# Patient Record
Sex: FEMALE | Race: WHITE | Employment: OTHER | URBAN - METROPOLITAN AREA
[De-identification: names, ages, dates, MRNs, and addresses within clinical notes are randomized per-mention and may not be internally consistent; named-entity substitution may affect disease eponyms.]

---

## 2017-01-01 ENCOUNTER — OFFICE VISIT (OUTPATIENT)
Dept: CARDIOLOGY CLINIC | Age: 67
End: 2017-01-01

## 2017-01-01 ENCOUNTER — HOSPITAL ENCOUNTER (INPATIENT)
Age: 67
LOS: 2 days | Discharge: HOME | DRG: 191 | End: 2017-08-04
Attending: EMERGENCY MEDICINE | Admitting: INTERNAL MEDICINE
Payer: OTHER GOVERNMENT

## 2017-01-01 ENCOUNTER — HOSPITAL ENCOUNTER (OUTPATIENT)
Dept: CT IMAGING | Age: 67
Discharge: HOME | End: 2017-11-01
Attending: INTERNAL MEDICINE
Payer: MEDICARE

## 2017-01-01 ENCOUNTER — TELEPHONE (OUTPATIENT)
Dept: RESPIRATORY THERAPY | Age: 67
End: 2017-01-01

## 2017-01-01 ENCOUNTER — HOSPITAL ENCOUNTER (EMERGENCY)
Age: 67
Discharge: HOME | End: 2017-08-25
Attending: EMERGENCY MEDICINE
Payer: OTHER GOVERNMENT

## 2017-01-01 ENCOUNTER — TELEPHONE (OUTPATIENT)
Dept: CARDIOLOGY CLINIC | Age: 67
End: 2017-01-01

## 2017-01-01 ENCOUNTER — APPOINTMENT (OUTPATIENT)
Dept: GENERAL RADIOLOGY | Age: 67
End: 2017-01-01
Attending: EMERGENCY MEDICINE
Payer: OTHER GOVERNMENT

## 2017-01-01 ENCOUNTER — HOSPITAL ENCOUNTER (OUTPATIENT)
Dept: NON INVASIVE DIAGNOSTICS | Age: 67
Discharge: HOME | End: 2017-10-13
Attending: NURSE PRACTITIONER
Payer: MEDICARE

## 2017-01-01 ENCOUNTER — APPOINTMENT (OUTPATIENT)
Dept: CT IMAGING | Age: 67
DRG: 191 | End: 2017-01-01
Attending: EMERGENCY MEDICINE
Payer: OTHER GOVERNMENT

## 2017-01-01 ENCOUNTER — APPOINTMENT (OUTPATIENT)
Dept: GENERAL RADIOLOGY | Age: 67
End: 2017-01-01
Attending: PHYSICIAN ASSISTANT
Payer: OTHER GOVERNMENT

## 2017-01-01 ENCOUNTER — CLINICAL SUPPORT (OUTPATIENT)
Dept: CARDIOLOGY CLINIC | Age: 67
End: 2017-01-01

## 2017-01-01 ENCOUNTER — APPOINTMENT (OUTPATIENT)
Dept: CT IMAGING | Age: 67
End: 2017-01-01
Attending: PHYSICIAN ASSISTANT
Payer: OTHER GOVERNMENT

## 2017-01-01 ENCOUNTER — DOCUMENTATION ONLY (OUTPATIENT)
Dept: CARDIOLOGY CLINIC | Age: 67
End: 2017-01-01

## 2017-01-01 VITALS
HEIGHT: 66 IN | OXYGEN SATURATION: 100 % | HEART RATE: 117 BPM | WEIGHT: 116.62 LBS | RESPIRATION RATE: 18 BRPM | BODY MASS INDEX: 18.74 KG/M2 | DIASTOLIC BLOOD PRESSURE: 77 MMHG | TEMPERATURE: 98.7 F | SYSTOLIC BLOOD PRESSURE: 146 MMHG

## 2017-01-01 VITALS
WEIGHT: 116 LBS | OXYGEN SATURATION: 93 % | TEMPERATURE: 98.5 F | HEIGHT: 65 IN | HEART RATE: 123 BPM | SYSTOLIC BLOOD PRESSURE: 104 MMHG | BODY MASS INDEX: 19.33 KG/M2 | RESPIRATION RATE: 24 BRPM | DIASTOLIC BLOOD PRESSURE: 64 MMHG

## 2017-01-01 VITALS
OXYGEN SATURATION: 98 % | HEIGHT: 67 IN | RESPIRATION RATE: 24 BRPM | DIASTOLIC BLOOD PRESSURE: 71 MMHG | TEMPERATURE: 97.6 F | HEART RATE: 93 BPM | SYSTOLIC BLOOD PRESSURE: 105 MMHG

## 2017-01-01 VITALS
TEMPERATURE: 98.9 F | SYSTOLIC BLOOD PRESSURE: 115 MMHG | HEART RATE: 82 BPM | WEIGHT: 120 LBS | DIASTOLIC BLOOD PRESSURE: 70 MMHG | BODY MASS INDEX: 19.29 KG/M2 | HEIGHT: 66 IN | RESPIRATION RATE: 20 BRPM | OXYGEN SATURATION: 94 %

## 2017-01-01 VITALS
BODY MASS INDEX: 19.3 KG/M2 | DIASTOLIC BLOOD PRESSURE: 64 MMHG | WEIGHT: 123 LBS | SYSTOLIC BLOOD PRESSURE: 102 MMHG | HEIGHT: 67 IN | HEART RATE: 87 BPM

## 2017-01-01 VITALS
RESPIRATION RATE: 16 BRPM | HEART RATE: 84 BPM | WEIGHT: 115 LBS | BODY MASS INDEX: 19.16 KG/M2 | TEMPERATURE: 98.5 F | SYSTOLIC BLOOD PRESSURE: 80 MMHG | DIASTOLIC BLOOD PRESSURE: 58 MMHG | HEIGHT: 65 IN

## 2017-01-01 DIAGNOSIS — Z85.118 H/O: LUNG CANCER: ICD-10-CM

## 2017-01-01 DIAGNOSIS — Z95.810 PRESENCE OF AUTOMATIC CARDIOVERTER/DEFIBRILLATOR (AICD): Primary | ICD-10-CM

## 2017-01-01 DIAGNOSIS — E86.0 DEHYDRATION: ICD-10-CM

## 2017-01-01 DIAGNOSIS — C34.90 LUNG CANCER (HCC): ICD-10-CM

## 2017-01-01 DIAGNOSIS — I50.22 CHRONIC SYSTOLIC HEART FAILURE (HCC): Primary | ICD-10-CM

## 2017-01-01 DIAGNOSIS — J43.1 PANLOBULAR EMPHYSEMA (HCC): ICD-10-CM

## 2017-01-01 DIAGNOSIS — Z95.810 PRESENCE OF AUTOMATIC CARDIOVERTER/DEFIBRILLATOR (AICD): ICD-10-CM

## 2017-01-01 DIAGNOSIS — J20.9 ACUTE BRONCHITIS, UNSPECIFIED ORGANISM: Primary | ICD-10-CM

## 2017-01-01 DIAGNOSIS — I50.22 CHF NYHA CLASS II (SYMPTOMS WITH MODERATELY STRENUOUS ACTIVITIES), CHRONIC, SYSTOLIC (HCC): ICD-10-CM

## 2017-01-01 DIAGNOSIS — Z95.810 ICD (IMPLANTABLE CARDIOVERTER-DEFIBRILLATOR) IN PLACE: Primary | ICD-10-CM

## 2017-01-01 DIAGNOSIS — I50.22 CHRONIC SYSTOLIC HEART FAILURE (HCC): ICD-10-CM

## 2017-01-01 DIAGNOSIS — J44.9 CHRONIC OBSTRUCTIVE PULMONARY DISEASE, UNSPECIFIED COPD TYPE (HCC): ICD-10-CM

## 2017-01-01 DIAGNOSIS — R62.7 FTT (FAILURE TO THRIVE) IN ADULT: ICD-10-CM

## 2017-01-01 DIAGNOSIS — I42.8 NICM (NONISCHEMIC CARDIOMYOPATHY) (HCC): ICD-10-CM

## 2017-01-01 DIAGNOSIS — J44.1 CHRONIC OBSTRUCTIVE PULMONARY DISEASE WITH ACUTE EXACERBATION (HCC): Primary | ICD-10-CM

## 2017-01-01 DIAGNOSIS — C34.90 MALIGNANT NEOPLASM OF LUNG, UNSPECIFIED LATERALITY, UNSPECIFIED PART OF LUNG (HCC): ICD-10-CM

## 2017-01-01 LAB
ALBUMIN SERPL BCP-MCNC: 3.2 G/DL (ref 3.5–5)
ALBUMIN SERPL-MCNC: 3 G/DL (ref 3.5–5)
ALBUMIN SERPL-MCNC: 3.1 G/DL (ref 3.5–5)
ALBUMIN/GLOB SERPL: 0.9 {RATIO} (ref 1.1–2.2)
ALBUMIN/GLOB SERPL: 0.9 {RATIO} (ref 1.1–2.2)
ALBUMIN/GLOB SERPL: 1 {RATIO} (ref 1.1–2.2)
ALP SERPL-CCNC: 74 U/L (ref 45–117)
ALP SERPL-CCNC: 74 U/L (ref 45–117)
ALP SERPL-CCNC: 84 U/L (ref 45–117)
ALT SERPL-CCNC: 17 U/L (ref 12–78)
ALT SERPL-CCNC: 18 U/L (ref 12–78)
ALT SERPL-CCNC: 20 U/L (ref 12–78)
ANION GAP BLD CALC-SCNC: 8 MMOL/L (ref 5–15)
ANION GAP BLD CALC-SCNC: 8 MMOL/L (ref 5–15)
ANION GAP SERPL CALC-SCNC: 7 MMOL/L (ref 5–15)
ANION GAP SERPL CALC-SCNC: 8 MMOL/L (ref 5–15)
APPEARANCE UR: CLEAR
AST SERPL W P-5'-P-CCNC: 16 U/L (ref 15–37)
AST SERPL-CCNC: 11 U/L (ref 15–37)
AST SERPL-CCNC: 7 U/L (ref 15–37)
ATRIAL RATE: 115 BPM
ATRIAL RATE: 90 BPM
BACTERIA SPEC CULT: NORMAL
BACTERIA URNS QL MICRO: NEGATIVE /HPF
BASOPHILS # BLD AUTO: 0 K/UL (ref 0–0.1)
BASOPHILS # BLD: 0 % (ref 0–1)
BASOPHILS # BLD: 0 K/UL (ref 0–0.1)
BASOPHILS NFR BLD: 0 % (ref 0–1)
BILIRUB SERPL-MCNC: 0.5 MG/DL (ref 0.2–1)
BILIRUB SERPL-MCNC: 0.5 MG/DL (ref 0.2–1)
BILIRUB SERPL-MCNC: 0.7 MG/DL (ref 0.2–1)
BILIRUB UR QL: NEGATIVE
BNP SERPL-MCNC: 32.1 PG/ML (ref 0–100)
BUN SERPL-MCNC: 14 MG/DL (ref 6–20)
BUN SERPL-MCNC: 17 MG/DL (ref 8–27)
BUN SERPL-MCNC: 20 MG/DL (ref 6–20)
BUN SERPL-MCNC: 23 MG/DL (ref 6–20)
BUN SERPL-MCNC: 24 MG/DL (ref 6–20)
BUN/CREAT SERPL: 15 (ref 12–28)
BUN/CREAT SERPL: 16 (ref 12–20)
BUN/CREAT SERPL: 20 (ref 12–20)
BUN/CREAT SERPL: 20 (ref 12–20)
BUN/CREAT SERPL: 21 (ref 12–20)
CALCIUM SERPL-MCNC: 8.3 MG/DL (ref 8.5–10.1)
CALCIUM SERPL-MCNC: 8.4 MG/DL (ref 8.5–10.1)
CALCIUM SERPL-MCNC: 8.6 MG/DL (ref 8.5–10.1)
CALCIUM SERPL-MCNC: 8.9 MG/DL (ref 8.5–10.1)
CALCIUM SERPL-MCNC: 9.3 MG/DL (ref 8.7–10.3)
CALCULATED P AXIS, ECG09: 75 DEGREES
CALCULATED P AXIS, ECG09: 78 DEGREES
CALCULATED R AXIS, ECG10: 68 DEGREES
CALCULATED R AXIS, ECG10: 71 DEGREES
CALCULATED T AXIS, ECG11: 78 DEGREES
CALCULATED T AXIS, ECG11: 82 DEGREES
CHLORIDE SERPL-SCNC: 101 MMOL/L (ref 97–108)
CHLORIDE SERPL-SCNC: 105 MMOL/L (ref 97–108)
CHLORIDE SERPL-SCNC: 95 MMOL/L (ref 97–108)
CHLORIDE SERPL-SCNC: 96 MMOL/L (ref 97–108)
CHLORIDE SERPL-SCNC: 99 MMOL/L (ref 96–106)
CO2 SERPL-SCNC: 25 MMOL/L (ref 18–29)
CO2 SERPL-SCNC: 26 MMOL/L (ref 21–32)
CO2 SERPL-SCNC: 27 MMOL/L (ref 21–32)
COLOR UR: ABNORMAL
CREAT SERPL-MCNC: 0.9 MG/DL (ref 0.55–1.02)
CREAT SERPL-MCNC: 1 MG/DL (ref 0.55–1.02)
CREAT SERPL-MCNC: 1.13 MG/DL (ref 0.55–1.02)
CREAT SERPL-MCNC: 1.13 MG/DL (ref 0.57–1)
CREAT SERPL-MCNC: 1.14 MG/DL (ref 0.55–1.02)
DIAGNOSIS, 93000: NORMAL
DIAGNOSIS, 93000: NORMAL
EOSINOPHIL # BLD: 0 K/UL (ref 0–0.4)
EOSINOPHIL # BLD: 0.1 K/UL (ref 0–0.4)
EOSINOPHIL NFR BLD: 0 % (ref 0–7)
EOSINOPHIL NFR BLD: 1 % (ref 0–7)
EPITH CASTS URNS QL MICRO: ABNORMAL /LPF
ERYTHROCYTE [DISTWIDTH] IN BLOOD BY AUTOMATED COUNT: 14.6 % (ref 11.5–14.5)
ERYTHROCYTE [DISTWIDTH] IN BLOOD BY AUTOMATED COUNT: 14.8 % (ref 11.5–14.5)
ERYTHROCYTE [DISTWIDTH] IN BLOOD BY AUTOMATED COUNT: 15.5 % (ref 11.5–14.5)
GLOBULIN SER CALC-MCNC: 3.1 G/DL (ref 2–4)
GLOBULIN SER CALC-MCNC: 3.3 G/DL (ref 2–4)
GLOBULIN SER CALC-MCNC: 3.4 G/DL (ref 2–4)
GLUCOSE SERPL-MCNC: 104 MG/DL (ref 65–100)
GLUCOSE SERPL-MCNC: 105 MG/DL (ref 65–100)
GLUCOSE SERPL-MCNC: 108 MG/DL (ref 65–100)
GLUCOSE SERPL-MCNC: 110 MG/DL (ref 65–99)
GLUCOSE SERPL-MCNC: 92 MG/DL (ref 65–100)
GLUCOSE UR STRIP.AUTO-MCNC: NEGATIVE MG/DL
HCT VFR BLD AUTO: 27 % (ref 35–47)
HCT VFR BLD AUTO: 29.4 % (ref 35–47)
HCT VFR BLD AUTO: 31.2 % (ref 35–47)
HGB BLD-MCNC: 10.4 G/DL (ref 11.5–16)
HGB BLD-MCNC: 9.1 G/DL (ref 11.5–16)
HGB BLD-MCNC: 9.7 G/DL (ref 11.5–16)
HGB UR QL STRIP: ABNORMAL
HYALINE CASTS URNS QL MICRO: ABNORMAL /LPF (ref 0–5)
KETONES UR QL STRIP.AUTO: NEGATIVE MG/DL
LACTATE SERPL-SCNC: 0.9 MMOL/L (ref 0.4–2)
LEUKOCYTE ESTERASE UR QL STRIP.AUTO: NEGATIVE
LYMPHOCYTES # BLD AUTO: 7 % (ref 12–49)
LYMPHOCYTES # BLD: 0.9 K/UL (ref 0.8–3.5)
LYMPHOCYTES # BLD: 1.1 K/UL (ref 0.8–3.5)
LYMPHOCYTES NFR BLD: 7 % (ref 12–49)
MAGNESIUM SERPL-MCNC: 1.9 MG/DL (ref 1.6–2.3)
MCH RBC QN AUTO: 30.4 PG (ref 26–34)
MCH RBC QN AUTO: 30.8 PG (ref 26–34)
MCH RBC QN AUTO: 31.8 PG (ref 26–34)
MCHC RBC AUTO-ENTMCNC: 33 G/DL (ref 30–36.5)
MCHC RBC AUTO-ENTMCNC: 33.3 G/DL (ref 30–36.5)
MCHC RBC AUTO-ENTMCNC: 33.7 G/DL (ref 30–36.5)
MCV RBC AUTO: 92.2 FL (ref 80–99)
MCV RBC AUTO: 92.3 FL (ref 80–99)
MCV RBC AUTO: 94.4 FL (ref 80–99)
MONOCYTES # BLD: 0.1 K/UL (ref 0–1)
MONOCYTES # BLD: 0.4 K/UL (ref 0–1)
MONOCYTES NFR BLD AUTO: 3 % (ref 5–13)
MONOCYTES NFR BLD: 1 % (ref 5–13)
NEUTS SEG # BLD: 12.8 K/UL (ref 1.8–8)
NEUTS SEG # BLD: 13.1 K/UL (ref 1.8–8)
NEUTS SEG NFR BLD AUTO: 90 % (ref 32–75)
NEUTS SEG NFR BLD: 91 % (ref 32–75)
NITRITE UR QL STRIP.AUTO: NEGATIVE
P-R INTERVAL, ECG05: 146 MS
P-R INTERVAL, ECG05: 150 MS
PH UR STRIP: 6 [PH] (ref 5–8)
PLATELET # BLD AUTO: 102 K/UL (ref 150–400)
PLATELET # BLD AUTO: 120 K/UL (ref 150–400)
PLATELET # BLD AUTO: 153 K/UL (ref 150–400)
POTASSIUM SERPL-SCNC: 3.9 MMOL/L (ref 3.5–5.1)
POTASSIUM SERPL-SCNC: 4 MMOL/L (ref 3.5–5.1)
POTASSIUM SERPL-SCNC: 4.1 MMOL/L (ref 3.5–5.1)
POTASSIUM SERPL-SCNC: 4.2 MMOL/L (ref 3.5–5.1)
POTASSIUM SERPL-SCNC: 4.4 MMOL/L (ref 3.5–5.2)
PROT SERPL-MCNC: 6.2 G/DL (ref 6.4–8.2)
PROT SERPL-MCNC: 6.3 G/DL (ref 6.4–8.2)
PROT SERPL-MCNC: 6.6 G/DL (ref 6.4–8.2)
PROT UR STRIP-MCNC: NEGATIVE MG/DL
Q-T INTERVAL, ECG07: 304 MS
Q-T INTERVAL, ECG07: 346 MS
QRS DURATION, ECG06: 74 MS
QRS DURATION, ECG06: 78 MS
QTC CALCULATION (BEZET), ECG08: 420 MS
QTC CALCULATION (BEZET), ECG08: 423 MS
RBC # BLD AUTO: 2.86 M/UL (ref 3.8–5.2)
RBC # BLD AUTO: 3.19 M/UL (ref 3.8–5.2)
RBC # BLD AUTO: 3.38 M/UL (ref 3.8–5.2)
RBC #/AREA URNS HPF: ABNORMAL /HPF (ref 0–5)
SERVICE CMNT-IMP: NORMAL
SODIUM SERPL-SCNC: 129 MMOL/L (ref 136–145)
SODIUM SERPL-SCNC: 130 MMOL/L (ref 136–145)
SODIUM SERPL-SCNC: 135 MMOL/L (ref 136–145)
SODIUM SERPL-SCNC: 139 MMOL/L (ref 136–145)
SODIUM SERPL-SCNC: 140 MMOL/L (ref 134–144)
SP GR UR REFRACTOMETRY: 1.01 (ref 1–1.03)
TROPONIN I SERPL-MCNC: <0.04 NG/ML
UROBILINOGEN UR QL STRIP.AUTO: 0.2 EU/DL (ref 0.2–1)
VENTRICULAR RATE, ECG03: 115 BPM
VENTRICULAR RATE, ECG03: 90 BPM
WBC # BLD AUTO: 13.9 K/UL (ref 3.6–11)
WBC # BLD AUTO: 14.5 K/UL (ref 3.6–11)
WBC # BLD AUTO: 7.2 K/UL (ref 3.6–11)
WBC URNS QL MICRO: ABNORMAL /HPF (ref 0–4)

## 2017-01-01 PROCEDURE — 71250 CT THORAX DX C-: CPT

## 2017-01-01 PROCEDURE — 74011250637 HC RX REV CODE- 250/637: Performed by: INTERNAL MEDICINE

## 2017-01-01 PROCEDURE — 74011000250 HC RX REV CODE- 250: Performed by: INTERNAL MEDICINE

## 2017-01-01 PROCEDURE — 36415 COLL VENOUS BLD VENIPUNCTURE: CPT | Performed by: INTERNAL MEDICINE

## 2017-01-01 PROCEDURE — 96361 HYDRATE IV INFUSION ADD-ON: CPT

## 2017-01-01 PROCEDURE — 96374 THER/PROPH/DIAG INJ IV PUSH: CPT

## 2017-01-01 PROCEDURE — 94640 AIRWAY INHALATION TREATMENT: CPT

## 2017-01-01 PROCEDURE — 85025 COMPLETE CBC W/AUTO DIFF WBC: CPT | Performed by: EMERGENCY MEDICINE

## 2017-01-01 PROCEDURE — 77030029684 HC NEB SM VOL KT MONA -A

## 2017-01-01 PROCEDURE — 99285 EMERGENCY DEPT VISIT HI MDM: CPT

## 2017-01-01 PROCEDURE — 74011636637 HC RX REV CODE- 636/637: Performed by: INTERNAL MEDICINE

## 2017-01-01 PROCEDURE — 74011636320 HC RX REV CODE- 636/320: Performed by: EMERGENCY MEDICINE

## 2017-01-01 PROCEDURE — 93306 TTE W/DOPPLER COMPLETE: CPT

## 2017-01-01 PROCEDURE — 87040 BLOOD CULTURE FOR BACTERIA: CPT | Performed by: EMERGENCY MEDICINE

## 2017-01-01 PROCEDURE — 74011000250 HC RX REV CODE- 250: Performed by: EMERGENCY MEDICINE

## 2017-01-01 PROCEDURE — 36415 COLL VENOUS BLD VENIPUNCTURE: CPT | Performed by: EMERGENCY MEDICINE

## 2017-01-01 PROCEDURE — 80053 COMPREHEN METABOLIC PANEL: CPT | Performed by: EMERGENCY MEDICINE

## 2017-01-01 PROCEDURE — 74011250636 HC RX REV CODE- 250/636: Performed by: INTERNAL MEDICINE

## 2017-01-01 PROCEDURE — 77010033678 HC OXYGEN DAILY

## 2017-01-01 PROCEDURE — 93005 ELECTROCARDIOGRAM TRACING: CPT

## 2017-01-01 PROCEDURE — 74011000258 HC RX REV CODE- 258: Performed by: EMERGENCY MEDICINE

## 2017-01-01 PROCEDURE — 74011250636 HC RX REV CODE- 250/636: Performed by: EMERGENCY MEDICINE

## 2017-01-01 PROCEDURE — 74011250636 HC RX REV CODE- 250/636: Performed by: PHYSICIAN ASSISTANT

## 2017-01-01 PROCEDURE — 80053 COMPREHEN METABOLIC PANEL: CPT | Performed by: PHYSICIAN ASSISTANT

## 2017-01-01 PROCEDURE — 65660000000 HC RM CCU STEPDOWN

## 2017-01-01 PROCEDURE — 85027 COMPLETE CBC AUTOMATED: CPT | Performed by: INTERNAL MEDICINE

## 2017-01-01 PROCEDURE — 83605 ASSAY OF LACTIC ACID: CPT | Performed by: EMERGENCY MEDICINE

## 2017-01-01 PROCEDURE — 65660000001 HC RM ICU INTERMED STEPDOWN

## 2017-01-01 PROCEDURE — 80048 BASIC METABOLIC PNL TOTAL CA: CPT | Performed by: INTERNAL MEDICINE

## 2017-01-01 PROCEDURE — 71275 CT ANGIOGRAPHY CHEST: CPT

## 2017-01-01 PROCEDURE — 71010 XR CHEST PORT: CPT

## 2017-01-01 PROCEDURE — 96365 THER/PROPH/DIAG IV INF INIT: CPT

## 2017-01-01 PROCEDURE — 74011000250 HC RX REV CODE- 250: Performed by: PHYSICIAN ASSISTANT

## 2017-01-01 PROCEDURE — 81001 URINALYSIS AUTO W/SCOPE: CPT | Performed by: INTERNAL MEDICINE

## 2017-01-01 PROCEDURE — 84484 ASSAY OF TROPONIN QUANT: CPT | Performed by: PHYSICIAN ASSISTANT

## 2017-01-01 RX ORDER — FERROUS SULFATE, DRIED 160(50) MG
1 TABLET, EXTENDED RELEASE ORAL
Status: CANCELLED | OUTPATIENT
Start: 2017-01-01

## 2017-01-01 RX ORDER — SODIUM CHLORIDE 0.9 % (FLUSH) 0.9 %
10 SYRINGE (ML) INJECTION
Status: COMPLETED | OUTPATIENT
Start: 2017-01-01 | End: 2017-01-01

## 2017-01-01 RX ORDER — IPRATROPIUM BROMIDE 0.5 MG/2.5ML
0.5 SOLUTION RESPIRATORY (INHALATION)
Status: DISCONTINUED | OUTPATIENT
Start: 2017-01-01 | End: 2017-01-01 | Stop reason: SDUPTHER

## 2017-01-01 RX ORDER — SPIRONOLACTONE 25 MG/1
25 TABLET ORAL DAILY
Status: DISCONTINUED | OUTPATIENT
Start: 2017-01-01 | End: 2017-01-01 | Stop reason: HOSPADM

## 2017-01-01 RX ORDER — ONDANSETRON 2 MG/ML
4 INJECTION INTRAMUSCULAR; INTRAVENOUS
Status: DISCONTINUED | OUTPATIENT
Start: 2017-01-01 | End: 2017-01-01 | Stop reason: ALTCHOICE

## 2017-01-01 RX ORDER — ALPRAZOLAM 0.25 MG/1
0.25 TABLET ORAL
Status: DISCONTINUED | OUTPATIENT
Start: 2017-01-01 | End: 2017-01-01

## 2017-01-01 RX ORDER — SODIUM CHLORIDE 0.9 % (FLUSH) 0.9 %
5-10 SYRINGE (ML) INJECTION EVERY 8 HOURS
Status: DISCONTINUED | OUTPATIENT
Start: 2017-01-01 | End: 2017-01-01 | Stop reason: HOSPADM

## 2017-01-01 RX ORDER — FUROSEMIDE 20 MG/1
10 TABLET ORAL AS NEEDED
Qty: 30 TAB | Refills: 0 | Status: SHIPPED | OUTPATIENT
Start: 2017-01-01 | End: 2018-01-01

## 2017-01-01 RX ORDER — ALBUTEROL SULFATE 0.83 MG/ML
2.5 SOLUTION RESPIRATORY (INHALATION)
Qty: 24 EACH | Refills: 3 | Status: SHIPPED | OUTPATIENT
Start: 2017-01-01 | End: 2018-01-01 | Stop reason: SDUPTHER

## 2017-01-01 RX ORDER — ONDANSETRON HYDROCHLORIDE 8 MG/1
8 TABLET, FILM COATED ORAL
COMMUNITY

## 2017-01-01 RX ORDER — PREDNISONE 20 MG/1
40 TABLET ORAL
Status: DISCONTINUED | OUTPATIENT
Start: 2017-01-01 | End: 2017-01-01 | Stop reason: HOSPADM

## 2017-01-01 RX ORDER — LISINOPRIL 20 MG/1
20 TABLET ORAL DAILY
Status: DISCONTINUED | OUTPATIENT
Start: 2017-01-01 | End: 2017-01-01 | Stop reason: HOSPADM

## 2017-01-01 RX ORDER — FUROSEMIDE 20 MG/1
10 TABLET ORAL AS NEEDED
Qty: 30 TAB | Refills: 0
Start: 2017-01-01 | End: 2017-01-01 | Stop reason: SDUPTHER

## 2017-01-01 RX ORDER — GUAIFENESIN 600 MG/1
600 TABLET, EXTENDED RELEASE ORAL
Status: DISCONTINUED | OUTPATIENT
Start: 2017-01-01 | End: 2017-01-01 | Stop reason: HOSPADM

## 2017-01-01 RX ORDER — LEVOFLOXACIN 5 MG/ML
750 INJECTION, SOLUTION INTRAVENOUS
Status: DISCONTINUED | OUTPATIENT
Start: 2017-01-01 | End: 2017-01-01

## 2017-01-01 RX ORDER — ALBUTEROL SULFATE 90 UG/1
2 AEROSOL, METERED RESPIRATORY (INHALATION)
Qty: 1 INHALER | Refills: 3 | Status: SHIPPED | OUTPATIENT
Start: 2017-01-01 | End: 2018-01-01 | Stop reason: SDUPTHER

## 2017-01-01 RX ORDER — AZITHROMYCIN 250 MG/1
250 TABLET, FILM COATED ORAL DAILY
Qty: 30 TAB | Refills: 1 | Status: SHIPPED | OUTPATIENT
Start: 2017-01-01 | End: 2017-01-01 | Stop reason: SDUPTHER

## 2017-01-01 RX ORDER — IPRATROPIUM BROMIDE AND ALBUTEROL SULFATE 2.5; .5 MG/3ML; MG/3ML
3 SOLUTION RESPIRATORY (INHALATION)
Status: DISCONTINUED | OUTPATIENT
Start: 2017-01-01 | End: 2017-01-01 | Stop reason: HOSPADM

## 2017-01-01 RX ORDER — AZITHROMYCIN 250 MG/1
250 TABLET, FILM COATED ORAL DAILY
Qty: 30 TAB | Refills: 3 | Status: SHIPPED | OUTPATIENT
Start: 2017-01-01

## 2017-01-01 RX ORDER — PREDNISONE 10 MG/1
10 TABLET ORAL
Status: DISCONTINUED | OUTPATIENT
Start: 2017-01-01 | End: 2017-01-01

## 2017-01-01 RX ORDER — LISINOPRIL 20 MG/1
20 TABLET ORAL DAILY
Qty: 30 TAB | Refills: 1 | Status: SHIPPED | OUTPATIENT
Start: 2017-01-01 | End: 2017-01-01 | Stop reason: SDUPTHER

## 2017-01-01 RX ORDER — ONDANSETRON 4 MG/1
8 TABLET, ORALLY DISINTEGRATING ORAL
Status: DISCONTINUED | OUTPATIENT
Start: 2017-01-01 | End: 2017-01-01 | Stop reason: HOSPADM

## 2017-01-01 RX ORDER — SODIUM CHLORIDE 0.9 % (FLUSH) 0.9 %
5-10 SYRINGE (ML) INJECTION AS NEEDED
Status: DISCONTINUED | OUTPATIENT
Start: 2017-01-01 | End: 2017-01-01 | Stop reason: HOSPADM

## 2017-01-01 RX ORDER — LEVOFLOXACIN 5 MG/ML
750 INJECTION, SOLUTION INTRAVENOUS
Status: COMPLETED | OUTPATIENT
Start: 2017-01-01 | End: 2017-01-01

## 2017-01-01 RX ORDER — GUAIFENESIN 100 MG/5ML
81 LIQUID (ML) ORAL DAILY
Status: DISCONTINUED | OUTPATIENT
Start: 2017-01-01 | End: 2017-01-01 | Stop reason: HOSPADM

## 2017-01-01 RX ORDER — METOPROLOL SUCCINATE 50 MG/1
50 TABLET, EXTENDED RELEASE ORAL DAILY
Qty: 30 TAB | Refills: 3 | Status: SHIPPED | OUTPATIENT
Start: 2017-01-01

## 2017-01-01 RX ORDER — TRAMADOL HYDROCHLORIDE 50 MG/1
50 TABLET ORAL
Status: DISCONTINUED | OUTPATIENT
Start: 2017-01-01 | End: 2017-01-01 | Stop reason: HOSPADM

## 2017-01-01 RX ORDER — LISINOPRIL 20 MG/1
20 TABLET ORAL DAILY
Qty: 30 TAB | Refills: 3 | Status: SHIPPED | OUTPATIENT
Start: 2017-01-01 | End: 2018-01-01 | Stop reason: SDUPTHER

## 2017-01-01 RX ORDER — ACETAMINOPHEN 325 MG/1
650 TABLET ORAL
Status: DISPENSED | OUTPATIENT
Start: 2017-01-01 | End: 2017-01-01

## 2017-01-01 RX ORDER — ENOXAPARIN SODIUM 100 MG/ML
40 INJECTION SUBCUTANEOUS EVERY 24 HOURS
Status: DISCONTINUED | OUTPATIENT
Start: 2017-01-01 | End: 2017-01-01 | Stop reason: HOSPADM

## 2017-01-01 RX ORDER — BUDESONIDE AND FORMOTEROL FUMARATE DIHYDRATE 160; 4.5 UG/1; UG/1
2 AEROSOL RESPIRATORY (INHALATION) 2 TIMES DAILY
Qty: 1 INHALER | Refills: 2 | Status: SHIPPED | OUTPATIENT
Start: 2017-01-01 | End: 2018-01-01 | Stop reason: SDUPTHER

## 2017-01-01 RX ORDER — ALBUTEROL SULFATE 0.83 MG/ML
2.5 SOLUTION RESPIRATORY (INHALATION)
Qty: 24 EACH | Refills: 1 | Status: SHIPPED | OUTPATIENT
Start: 2017-01-01 | End: 2017-01-01 | Stop reason: SDUPTHER

## 2017-01-01 RX ORDER — SPIRONOLACTONE 25 MG/1
25 TABLET ORAL DAILY
Qty: 30 TAB | Refills: 1 | Status: SHIPPED | OUTPATIENT
Start: 2017-01-01 | End: 2017-01-01 | Stop reason: SDUPTHER

## 2017-01-01 RX ORDER — PROCHLORPERAZINE MALEATE 10 MG
10 TABLET ORAL
COMMUNITY

## 2017-01-01 RX ORDER — ALBUTEROL SULFATE 0.83 MG/ML
2.5 SOLUTION RESPIRATORY (INHALATION)
Status: DISCONTINUED | OUTPATIENT
Start: 2017-01-01 | End: 2017-01-01 | Stop reason: HOSPADM

## 2017-01-01 RX ORDER — BUDESONIDE 0.5 MG/2ML
500 INHALANT ORAL
Status: DISCONTINUED | OUTPATIENT
Start: 2017-01-01 | End: 2017-01-01 | Stop reason: HOSPADM

## 2017-01-01 RX ORDER — DEXAMETHASONE 4 MG/1
8 TABLET ORAL DAILY
COMMUNITY
End: 2017-01-01 | Stop reason: ALTCHOICE

## 2017-01-01 RX ORDER — ARFORMOTEROL TARTRATE 15 UG/2ML
15 SOLUTION RESPIRATORY (INHALATION)
Status: DISCONTINUED | OUTPATIENT
Start: 2017-01-01 | End: 2017-01-01 | Stop reason: HOSPADM

## 2017-01-01 RX ORDER — TRAMADOL HYDROCHLORIDE 50 MG/1
50 TABLET ORAL
COMMUNITY
End: 2018-01-01

## 2017-01-01 RX ORDER — ALBUTEROL SULFATE 90 UG/1
2 AEROSOL, METERED RESPIRATORY (INHALATION)
COMMUNITY
End: 2017-01-01 | Stop reason: SDUPTHER

## 2017-01-01 RX ORDER — IPRATROPIUM BROMIDE AND ALBUTEROL SULFATE 2.5; .5 MG/3ML; MG/3ML
3 SOLUTION RESPIRATORY (INHALATION)
Status: COMPLETED | OUTPATIENT
Start: 2017-01-01 | End: 2017-01-01

## 2017-01-01 RX ORDER — SPIRONOLACTONE 25 MG/1
25 TABLET ORAL DAILY
Qty: 30 TAB | Refills: 3 | Status: SHIPPED | OUTPATIENT
Start: 2017-01-01

## 2017-01-01 RX ORDER — SODIUM CHLORIDE 9 MG/ML
100 INJECTION, SOLUTION INTRAVENOUS CONTINUOUS
Status: DISCONTINUED | OUTPATIENT
Start: 2017-01-01 | End: 2017-01-01 | Stop reason: HOSPADM

## 2017-01-01 RX ORDER — ACETAMINOPHEN 325 MG/1
650 TABLET ORAL
Status: DISCONTINUED | OUTPATIENT
Start: 2017-01-01 | End: 2017-01-01 | Stop reason: HOSPADM

## 2017-01-01 RX ORDER — METOPROLOL SUCCINATE 50 MG/1
50 TABLET, EXTENDED RELEASE ORAL DAILY
Status: DISCONTINUED | OUTPATIENT
Start: 2017-01-01 | End: 2017-01-01 | Stop reason: HOSPADM

## 2017-01-01 RX ORDER — ALBUTEROL SULFATE 90 UG/1
2 AEROSOL, METERED RESPIRATORY (INHALATION)
Status: DISCONTINUED | OUTPATIENT
Start: 2017-01-01 | End: 2017-01-01 | Stop reason: SDUPTHER

## 2017-01-01 RX ORDER — LEVOFLOXACIN 5 MG/ML
750 INJECTION, SOLUTION INTRAVENOUS EVERY 24 HOURS
Status: DISCONTINUED | OUTPATIENT
Start: 2017-01-01 | End: 2017-01-01 | Stop reason: HOSPADM

## 2017-01-01 RX ORDER — AZITHROMYCIN 250 MG/1
250 TABLET, FILM COATED ORAL DAILY
COMMUNITY
End: 2017-01-01 | Stop reason: SDUPTHER

## 2017-01-01 RX ADMIN — IPRATROPIUM BROMIDE AND ALBUTEROL SULFATE 3 ML: .5; 3 SOLUTION RESPIRATORY (INHALATION) at 01:48

## 2017-01-01 RX ADMIN — ALBUTEROL SULFATE 1 DOSE: 2.5 SOLUTION RESPIRATORY (INHALATION) at 17:40

## 2017-01-01 RX ADMIN — SODIUM CHLORIDE 100 ML/HR: 900 INJECTION, SOLUTION INTRAVENOUS at 19:19

## 2017-01-01 RX ADMIN — Medication 10 ML: at 14:00

## 2017-01-01 RX ADMIN — SODIUM CHLORIDE 100 ML/HR: 900 INJECTION, SOLUTION INTRAVENOUS at 23:34

## 2017-01-01 RX ADMIN — ALBUTEROL SULFATE 1 DOSE: 2.5 SOLUTION RESPIRATORY (INHALATION) at 17:46

## 2017-01-01 RX ADMIN — PREDNISONE 40 MG: 20 TABLET ORAL at 09:13

## 2017-01-01 RX ADMIN — SODIUM CHLORIDE 100 ML: 900 INJECTION, SOLUTION INTRAVENOUS at 17:41

## 2017-01-01 RX ADMIN — SPIRONOLACTONE 25 MG: 25 TABLET, FILM COATED ORAL at 08:08

## 2017-01-01 RX ADMIN — Medication 10 ML: at 23:34

## 2017-01-01 RX ADMIN — IPRATROPIUM BROMIDE AND ALBUTEROL SULFATE 3 ML: .5; 3 SOLUTION RESPIRATORY (INHALATION) at 13:28

## 2017-01-01 RX ADMIN — Medication 10 ML: at 06:01

## 2017-01-01 RX ADMIN — BUDESONIDE 500 MCG: 0.5 INHALANT RESPIRATORY (INHALATION) at 07:50

## 2017-01-01 RX ADMIN — IPRATROPIUM BROMIDE AND ALBUTEROL SULFATE 3 ML: .5; 3 SOLUTION RESPIRATORY (INHALATION) at 07:38

## 2017-01-01 RX ADMIN — SODIUM CHLORIDE 100 ML: 900 INJECTION, SOLUTION INTRAVENOUS at 14:46

## 2017-01-01 RX ADMIN — PREDNISONE 40 MG: 20 TABLET ORAL at 08:08

## 2017-01-01 RX ADMIN — LEVOFLOXACIN 750 MG: 5 INJECTION, SOLUTION INTRAVENOUS at 19:46

## 2017-01-01 RX ADMIN — BUDESONIDE 500 MCG: 0.5 INHALANT RESPIRATORY (INHALATION) at 19:50

## 2017-01-01 RX ADMIN — SODIUM CHLORIDE 1000 ML: 900 INJECTION, SOLUTION INTRAVENOUS at 13:29

## 2017-01-01 RX ADMIN — ASPIRIN 81 MG 81 MG: 81 TABLET ORAL at 08:08

## 2017-01-01 RX ADMIN — METHYLPREDNISOLONE SODIUM SUCCINATE 80 MG: 40 INJECTION, POWDER, FOR SOLUTION INTRAMUSCULAR; INTRAVENOUS at 12:47

## 2017-01-01 RX ADMIN — Medication 10 ML: at 17:41

## 2017-01-01 RX ADMIN — SPIRONOLACTONE 25 MG: 25 TABLET, FILM COATED ORAL at 09:12

## 2017-01-01 RX ADMIN — IOPAMIDOL 60 ML: 755 INJECTION, SOLUTION INTRAVENOUS at 17:40

## 2017-01-01 RX ADMIN — METOPROLOL SUCCINATE 50 MG: 50 TABLET, EXTENDED RELEASE ORAL at 09:12

## 2017-01-01 RX ADMIN — ALPRAZOLAM 0.25 MG: 0.25 TABLET ORAL at 23:31

## 2017-01-01 RX ADMIN — SODIUM CHLORIDE 500 ML: 900 INJECTION, SOLUTION INTRAVENOUS at 19:46

## 2017-01-01 RX ADMIN — ALBUTEROL SULFATE 1 DOSE: 2.5 SOLUTION RESPIRATORY (INHALATION) at 19:05

## 2017-01-01 RX ADMIN — ASPIRIN 81 MG 81 MG: 81 TABLET ORAL at 09:12

## 2017-01-01 RX ADMIN — ENOXAPARIN SODIUM 40 MG: 40 INJECTION SUBCUTANEOUS at 23:32

## 2017-01-01 RX ADMIN — IPRATROPIUM BROMIDE AND ALBUTEROL SULFATE 3 ML: .5; 3 SOLUTION RESPIRATORY (INHALATION) at 12:32

## 2017-01-01 RX ADMIN — IPRATROPIUM BROMIDE AND ALBUTEROL SULFATE 3 ML: .5; 3 SOLUTION RESPIRATORY (INHALATION) at 19:50

## 2017-01-01 RX ADMIN — LISINOPRIL 20 MG: 20 TABLET ORAL at 09:12

## 2017-01-01 RX ADMIN — LEVOFLOXACIN 750 MG: 5 INJECTION, SOLUTION INTRAVENOUS at 19:05

## 2017-01-01 RX ADMIN — IPRATROPIUM BROMIDE AND ALBUTEROL SULFATE 3 ML: .5; 3 SOLUTION RESPIRATORY (INHALATION) at 07:50

## 2017-01-01 RX ADMIN — IPRATROPIUM BROMIDE AND ALBUTEROL SULFATE 3 ML: .5; 3 SOLUTION RESPIRATORY (INHALATION) at 21:39

## 2017-01-01 RX ADMIN — BUDESONIDE 500 MCG: 0.5 INHALANT RESPIRATORY (INHALATION) at 07:38

## 2017-01-01 RX ADMIN — ENOXAPARIN SODIUM 40 MG: 40 INJECTION SUBCUTANEOUS at 22:19

## 2017-01-01 RX ADMIN — LISINOPRIL 20 MG: 20 TABLET ORAL at 08:08

## 2017-01-01 RX ADMIN — ONDANSETRON 8 MG: 4 TABLET, ORALLY DISINTEGRATING ORAL at 09:11

## 2017-01-01 RX ADMIN — Medication 10 ML: at 14:46

## 2017-01-01 RX ADMIN — IOPAMIDOL 100 ML: 755 INJECTION, SOLUTION INTRAVENOUS at 14:46

## 2017-01-01 RX ADMIN — METOPROLOL SUCCINATE 50 MG: 50 TABLET, EXTENDED RELEASE ORAL at 08:08

## 2017-01-01 RX ADMIN — Medication 10 ML: at 22:20

## 2017-01-02 ENCOUNTER — TELEPHONE (OUTPATIENT)
Dept: CASE MANAGEMENT | Age: 67
End: 2017-01-02

## 2017-01-10 ENCOUNTER — TELEPHONE (OUTPATIENT)
Dept: CARDIOLOGY CLINIC | Age: 67
End: 2017-01-10

## 2017-01-10 NOTE — TELEPHONE ENCOUNTER
Returned patient's call, she was unaware she had an appointment with the [facility] tomorrow and has another conflicting appointment.   Moved her to 1/18/17 at 1:30pm.

## 2017-01-17 ENCOUNTER — TELEPHONE (OUTPATIENT)
Dept: CARDIOLOGY CLINIC | Age: 67
End: 2017-01-17

## 2017-01-18 ENCOUNTER — OFFICE VISIT (OUTPATIENT)
Dept: CARDIOLOGY CLINIC | Age: 67
End: 2017-01-18

## 2017-01-18 VITALS
TEMPERATURE: 98.6 F | DIASTOLIC BLOOD PRESSURE: 70 MMHG | RESPIRATION RATE: 20 BRPM | OXYGEN SATURATION: 95 % | SYSTOLIC BLOOD PRESSURE: 112 MMHG | HEIGHT: 67 IN | WEIGHT: 123.2 LBS | HEART RATE: 80 BPM | BODY MASS INDEX: 19.34 KG/M2

## 2017-01-18 DIAGNOSIS — I42.8 NICM (NONISCHEMIC CARDIOMYOPATHY) (HCC): ICD-10-CM

## 2017-01-18 DIAGNOSIS — I50.22 CHRONIC SYSTOLIC HEART FAILURE (HCC): Primary | ICD-10-CM

## 2017-01-18 NOTE — PROGRESS NOTES
HISTORY OF PRESENT ILLNESS    HPI            Xander Pino is a 61 y.o. Female. Her past medical history is significant for NICM, [id number]% (Echo October 2016) lung cancer with left lobectomy. She is here for her follow up visit. She had a recent hospitalization in December 2016, for shortness of breath and was diagnosed with pneumonia. She was on the discharged on 1/9/17. She stated that she is doing well. At her follow up appointment at the [place] she was started on a dose of prednisone (last dose 1/17). She is also taking albuterol 3-4 times a day. She uses 2 L NC at night. She stated using it sometimes during the day. She feels stressed about \" pacing\"  her activity and has to use the oxygen but does not use it more than 20 minutes. She cannot differentiate if the shortness of breath is from her COPD or her heart failure. She does not monitor her BP but is weighing herself daily. She has not started the cardiac rehab yet- the request is in the [place] but needs scheduling from her PCP. Her ICD has not fired. She stated not smoking but only vaping 2-3 times a day. Her quit date as of now is 10/26/17. Patient had started to take Toprol before her hospitalization. She was supposed to finish coreg and start Toprol secondary to her COPD. Review of Systems   Constitutional: Negative for chills, fever, malaise/fatigue and weight loss. Eyes: Negative for blurred vision. Respiratory: Positive for cough, sputum production and shortness of breath. Negative for wheezing. Cardiovascular: Negative for chest pain, palpitations, orthopnea, leg swelling and PND. Gastrointestinal: Negative for abdominal pain, constipation, diarrhea and nausea. Neurological: Negative for dizziness, tingling and headaches. Psychiatric/Behavioral: The patient does not have insomnia. Objective    Physical Exam   Constitutional: She is oriented to person, place, and time. She appears well-developed. No distress.    HENT:   Head: Normocephalic and atraumatic. Nose: Nose normal.   Eyes: EOM are normal. Pupils are equal, round, and reactive to light. Neck: Normal range of motion. Neck supple. No JVD present. Cardiovascular: Normal rate, regular rhythm, normal heart sounds and intact distal pulses. Pulmonary/Chest: Effort normal. No respiratory distress. She has no wheezes. She has no rales. Diminished   Abdominal: Soft. Bowel sounds are normal. She exhibits no distension. There is no tenderness. Musculoskeletal: Normal range of motion. She exhibits no edema. Neurological: She is alert and oriented to person, place, and time. Skin: Skin is warm and dry. Psychiatric: She has a normal mood and affect. Her behavior is normal.       Visit Vitals    /70 (BP 1 Location: Left arm, BP Patient Position: Sitting)    Pulse 80    Temp 98.6 °F (37 °C) (Oral)    Resp 20    Ht 5' 6.5\" (1.689 m)    Wt 123 lb 3.2 oz (55.9 kg)    SpO2 95%    BMI 19.59 kg/m2     Lab Results   Component Value Date/Time    Sodium 141 12/30/2016 04:19 AM    Potassium 4.0 12/30/2016 04:19 AM    Chloride 105 12/30/2016 04:19 AM    CO2 28 12/30/2016 04:19 AM    Anion gap 8 12/30/2016 04:19 AM    Glucose 135 12/30/2016 04:19 AM    BUN 22 12/30/2016 04:19 AM    Creatinine 0.72 12/30/2016 04:19 AM    BUN/Creatinine ratio 31 12/30/2016 04:19 AM    GFR est AA >60 12/30/2016 04:19 AM    GFR est non-AA >60 12/30/2016 04:19 AM    Calcium 9.7 12/30/2016 04:19 AM     Lab Results   Component Value Date/Time    WBC 16.6 12/30/2016 04:19 AM    HGB 12.6 12/30/2016 04:19 AM    HCT 38.5 12/30/2016 04:19 AM    PLATELET 745 66/53/1734 04:19 AM    MCV 91.9 12/30/2016 04:19 AM     Wt Readings from Last 3 Encounters:   01/18/17 123 lb 3.2 oz (55.9 kg)   12/30/16 122 lb 5.7 oz (55.5 kg)   11/16/16 120 lb 12.8 oz (54.8 kg)     ASSESSMENT and PLAN    NICM HFrEF 35-45% NYHA class II  1. Continue with current medication regimen.    2. Tereso Sinclair considered but- patient not qualifying secondary to EF 35-45%. If decline in EF then consider Entresto. 3. Patient advised to pursue PCP to schedule the cardiac rehab. 4. Patient has Echo scheduled at the [place]- I asked her if she could have the results forwarded to [facility]. 5. Follow up in three months. Plan discussed with Dr Jess Hendrickson and Ms Conde. I saw and examined the patient and discussed the assessment and plans with Sada Camejo NP. She had the recent admission for pneumonia. She has been improving with NYHA class II symptoms. Her exam revealed poor air movement without gallop, neck vein distention, HSM or edema. I viewed her last echo and the EF on the final result was noted 35 - 45%. The las MIBI noted 28%. I thought the EF was around 35% or so. I think for now we will keep the lisinopril. If her EF falls <35% then transition over to sacubitril/valsartan.       Zi Zazueta MD

## 2017-01-18 NOTE — MR AVS SNAPSHOT
Visit Information Date & Time Provider Department Dept. Phone Encounter #  
 1/18/2017  1:30 PM Yesika Oneal MD [address] [id number] Your Appointments 2/27/2017  1:00 PM  
ESTABLISHED PATIENT with Jessica Prescott MD  
[facility] ([facility]) Appt Note: sjm ICD/rc/Johnson chronic b 11-9-16 pt r/s from 2/16 to 2/27  
 [address] [facility] [address]  
[address] [place] [address]  
  
    
 2/27/2017  1:15 PM  
PACEMAKER with Dwayne Cr [facility] ([facility] SCHEDULING) Appt Note: sjm ICD/rc/Johnson chronic b 11-9-16; sjm ICD/rc/Johnson chronic b 11-9-16 pt r/s from 2/16 to 2/27  
 [address]  
[address] [facility],[address] [facility] [address] Upcoming Health Maintenance Date Due Hepatitis C Screening 1950 DTaP/Tdap/Td series (1 - Tdap) 10/20/1973 PAP AKA CERVICAL CYTOLOGY 10/20/1973 BREAST CANCER SCRN MAMMOGRAM 10/20/2002 FOBT Q 1 YEAR AGE 50-75 10/20/2002 Pneumococcal 19-64 Highest Risk (2 of 3 - PCV13) 11/21/2011 ZOSTER VACCINE AGE 60> 10/20/2012 Allergies as of 1/18/2017  Review Complete On: 12/30/2016 By: Felicia Richardson MD  
 No Known Allergies Current Immunizations  Reviewed on 12/30/2016 Name Date Influenza Vaccine 10/15/2014 Influenza Vaccine Mirian Savory) 10/10/2016 Pneumococcal Vaccine (Unspecified Type) 11/21/2010 Not reviewed this visit Vitals BP Pulse Temp Resp Height(growth percentile) Weight(growth percentile) 112/70 (BP 1 Location: Left arm, BP Patient Position: Sitting) 80 98.6 °F (37 °C) (Oral) 20 5' 6.5\" (1.689 m) 123 lb 3.2 oz (55.9 kg) SpO2 BMI OB Status Smoking Status 95% 19.59 kg/m2 Hysterectomy Current Every Day Smoker Vitals History BMI and BSA Data Body Mass Index Body Surface Area  
 19.59 kg/m 2 1.62 m 2 Your Updated Medication List  
  
   
This list is accurate as of: 1/18/17  2:24 PM.  Always use your most recent med list.  
  
  
  
  
 * albuterol 2.5 mg /3 mL (0.083 %) nebulizer solution Commonly known as:  PROVENTIL VENTOLIN  
2.5 mg by Nebulization route every four (4) hours as needed for Wheezing. * albuterol 90 mcg/actuation inhaler Commonly known as:  PROVENTIL HFA, VENTOLIN HFA, PROAIR HFA Take 2 Puffs by inhalation every four (4) hours as needed for Wheezing or Shortness of Breath. aspirin 81 mg chewable tablet Take 1 Tab by mouth daily. calcium-vitamin D 500 mg(1,250mg) -200 unit per tablet Commonly known as:  OYSTER SHELL Take 1 Tab by mouth daily (with breakfast). furosemide 20 mg tablet Commonly known as:  LASIX Take 1 Tab by mouth daily. lisinopril 20 mg tablet Commonly known as:  Ronalee Ruffing Take 20 mg by mouth daily. metoprolol succinate 50 mg XL tablet Commonly known as:  TOPROL-XL Take 1 Tab by mouth daily. MUCINEX 600 mg ER tablet Generic drug:  guaiFENesin ER Take 600 mg by mouth two (2) times daily as needed for Congestion. predniSONE 10 mg dose pack Commonly known as:  STERAPRED DS See administration instruction per 10mg dose pack SPIRIVA WITH HANDIHALER 18 mcg inhalation capsule Generic drug:  tiotropium Take 1 Cap by inhalation every evening. spironolactone 25 mg tablet Commonly known as:  ALDACTONE Take 1 Tab by mouth daily. SYMBICORT 160-4.5 mcg/actuation HFA inhaler Generic drug:  budesonide-formoterol Take 2 Puffs by inhalation two (2) times a day. traMADol 50 mg tablet Commonly known as:  ULTRAM  
Take 50 mg by mouth daily. Indications: Left should pain * Notice: This list has 2 medication(s) that are the same as other medications prescribed for you.  Read the directions carefully, and ask your doctor or other care provider to review them with you. Patient Instructions 1. Please follow up with PCP regarding your cardiac rehab. 
2. If you can please try to quit use of vapors. 3. Continue with Toprol 25 mg. 
4. Watch your sodium, monitor your weight, 
5. Continue with your medication regimen. 6. [facility] to check your Ejection fraction. Introducing [facility] SERVICES! Jose Diaz introduces Mo[employer] patient portal. Now you can access parts of your medical record, email your doctor's office, and request medication refills online. 1. In your internet browser, go to [email or web address] 2. Click on the First Time User? Click Here link in the Sign In box. You will see the New Member Sign Up page. 3. Enter your Mozy Access Code exactly as it appears below. You will not need to use this code after youve completed the sign-up process. If you do not sign up before the expiration date, you must request a new code. · Mozy Access Code: [email or web address] Expires: 1/24/2017  2:43 PM 
 
4. Enter the last four digits of your Social Security Number (xxxx) and Date of Birth (mm/dd/yyyy) as indicated and click Submit. You will be taken to the next sign-up page. 5. Create a Mozy ID. This will be your Mozy login ID and cannot be changed, so think of one that is secure and easy to remember. 6. Create a Mozy password. You can change your password at any time. 7. Enter your Password Reset Question and Answer. This can be used at a later time if you forget your password. 8. Enter your e-mail address. You will receive e-mail notification when new information is available in [address] 9. Click Sign Up. You can now view and download portions of your medical record. 10. Click the Download Summary menu link to download a portable copy of your medical information. If you have questions, please visit the Frequently Asked Questions section of the [employer] website. Remember, [employer] is NOT to be used for urgent needs. For medical emergencies, dial 911. Now available from your iPhone and Android! Please provide this summary of care documentation to your next provider. Your primary care clinician is listed as Drew Cross. If you have any questions after today's visit, please call [phone].

## 2017-01-18 NOTE — PATIENT INSTRUCTIONS
1. Please follow up with PCP regarding your cardiac rehab.  2. If you can please try to quit use of vapors. 3. Continue with Toprol 25 mg.  4. Watch your sodium, monitor your weight,  5. Continue with your medication regimen. 6. [facility] to check your Ejection fraction.

## 2017-01-20 NOTE — COMMUNICATION BODY
HISTORY OF PRESENT ILLNESS    HPI            Omid Souza is a 61 y.o. Female. Her past medical history is significant for NICM, [id number]% (Echo October 2016) lung cancer with left lobectomy. She is here for her follow up visit. She had a recent hospitalization in December 2016, for shortness of breath and was diagnosed with pneumonia. She was on the discharged on 1/9/17. She stated that she is doing well. At her follow up appointment at the [facility] she was started on a dose of prednisone (last dose 1/17). She is also taking albuterol 3-4 times a day. She uses 2 L NC at night. She stated using it sometimes during the day. She feels stressed about \" pacing\"  her activity and has to use the oxygen but does not use it more than 20 minutes. She cannot differentiate if the shortness of breath is from her COPD or her heart failure. She does not monitor her BP but is weighing herself daily. She has not started the cardiac rehab yet- the request is in the [facility] but needs scheduling from her PCP. Her ICD has not fired. She stated not smoking but only vaping 2-3 times a day. Her quit date as of now is 10/26/17. Patient had started to take Toprol before her hospitalization. She was supposed to finish coreg and start Toprol secondary to her COPD. Review of Systems   Constitutional: Negative for chills, fever, malaise/fatigue and weight loss. Eyes: Negative for blurred vision. Respiratory: Positive for cough, sputum production and shortness of breath. Negative for wheezing. Cardiovascular: Negative for chest pain, palpitations, orthopnea, leg swelling and PND. Gastrointestinal: Negative for abdominal pain, constipation, diarrhea and nausea. Neurological: Negative for dizziness, tingling and headaches. Psychiatric/Behavioral: The patient does not have insomnia. Objective    Physical Exam   Constitutional: She is oriented to person, place, and time. She appears well-developed. No distress.    HENT:   Head: Normocephalic and atraumatic. Nose: Nose normal.   Eyes: EOM are normal. Pupils are equal, round, and reactive to light. Neck: Normal range of motion. Neck supple. No JVD present. Cardiovascular: Normal rate, regular rhythm, normal heart sounds and intact distal pulses. Pulmonary/Chest: Effort normal. No respiratory distress. She has no wheezes. She has no rales. Diminished   Abdominal: Soft. Bowel sounds are normal. She exhibits no distension. There is no tenderness. Musculoskeletal: Normal range of motion. She exhibits no edema. Neurological: She is alert and oriented to person, place, and time. Skin: Skin is warm and dry. Psychiatric: She has a normal mood and affect. Her behavior is normal.       Visit Vitals    /70 (BP 1 Location: Left arm, BP Patient Position: Sitting)    Pulse 80    Temp 98.6 °F (37 °C) (Oral)    Resp 20    Ht 5' 6.5\" (1.689 m)    Wt 123 lb 3.2 oz (55.9 kg)    SpO2 95%    BMI 19.59 kg/m2     Lab Results   Component Value Date/Time    Sodium 141 12/30/2016 04:19 AM    Potassium 4.0 12/30/2016 04:19 AM    Chloride 105 12/30/2016 04:19 AM    CO2 28 12/30/2016 04:19 AM    Anion gap 8 12/30/2016 04:19 AM    Glucose 135 12/30/2016 04:19 AM    BUN 22 12/30/2016 04:19 AM    Creatinine 0.72 12/30/2016 04:19 AM    BUN/Creatinine ratio 31 12/30/2016 04:19 AM    GFR est AA >60 12/30/2016 04:19 AM    GFR est non-AA >60 12/30/2016 04:19 AM    Calcium 9.7 12/30/2016 04:19 AM     Lab Results   Component Value Date/Time    WBC 16.6 12/30/2016 04:19 AM    HGB 12.6 12/30/2016 04:19 AM    HCT 38.5 12/30/2016 04:19 AM    PLATELET 854 94/66/3861 04:19 AM    MCV 91.9 12/30/2016 04:19 AM     Wt Readings from Last 3 Encounters:   01/18/17 123 lb 3.2 oz (55.9 kg)   12/30/16 122 lb 5.7 oz (55.5 kg)   11/16/16 120 lb 12.8 oz (54.8 kg)     ASSESSMENT and PLAN    NICM HFrEF 35-45% NYHA class II  1. Continue with current medication regimen.    2. Kaylee Dancer considered but- patient not qualifying secondary to EF 35-45%. 3. Patient advised to pursue PCP to schedule the cardiac rehab. 4. Patient has Echo scheduled at the [place]- I asked her if she could have the results forwarded to [facility]. If decline from previous value will consider Entresto. 5. Follow up in three months. Plan discussed with Dr Kassie Frances and Ms Conde. I saw and examined the patient and discussed the assessment and plans with Sukhjinder Khan NP. She had the recent admission for pneumonia. She has been improving with NYHA class II symptoms. Her exam revealed poor air movement without gallop, neck vein distention, HSM or edema. I viewed her last echo and the EF on the final result was noted 35 - 45%. The las MIBI noted 28%. I thought the EF was around 35% or so. I think for now we will keep the lisinopril. If her EF falls <35% then transition over to sacubitril/valsartan.       Fausto Souza MD

## 2017-02-27 NOTE — PROGRESS NOTES
Cardiac Electrophysiology Office Note     Subjective:      Omid Souza is a 61 y.o. female patient who is seen for management of single lead [facility] [employer] ICD 11/4/16. Hx of non ischemic cardiomyopathy and AICD  She has LVEF 28-30% by stress test and cath while echo 40%  She has non obstructive CAD  Her echo in 2015 showed EF 40% too  She had been on optimal medications  She had lung cancer but no chemo and had surgery  She is seen at [facility] usually for lung disease     She is here today for follow up. She reports she has been okay, no change in SOB. She says her pulmonologist's started her on Zithromax (chronically). She says there have been studies that shows this is helpfully in decreasing COPD exacerbations and hospitalizations. She denies recent hospitalizations. No lightheadedness, dizziness, chest pain. No LE swelling. No syncope or falls. Her  is a , she is able to get health care at the [facility]. She will have to get a new PCP and pulmonologist's by the end of he year. She is going to see a cardiologists at the [facility], but wants to keep Dr. Joyce Beltran as a physician d/t the above. Patient Active Problem List   Diagnosis Code    Hypoxia R09.02    Multiple thyroid nodules E04.2    Tobacco use disorder G28.783    Systolic heart failure (HCC) I50.20    S/P ICD (internal cardiac defibrillator) procedure Z95.810    Heart failure, left, with LVEF <=30% ([facility]) I50.1    H/O: lung cancer Z85. 80    NICM (nonischemic cardiomyopathy) ([facility]) I42.9     Current Outpatient Prescriptions   Medication Sig Dispense Refill    azithromycin (ZITHROMAX) 250 mg tablet Take 250 mg by mouth daily.  predniSONE (STERAPRED DS) 10 mg dose pack See administration instruction per 10mg dose pack 21 Tab 0    metoprolol succinate (TOPROL-XL) 50 mg XL tablet Take 1 Tab by mouth daily. 30 Tab 3    furosemide (LASIX) 20 mg tablet Take 1 Tab by mouth daily.  30 Tab 0    spironolactone (ALDACTONE) 25 mg tablet Take 1 Tab by mouth daily. 30 Tab 0    aspirin 81 mg chewable tablet Take 1 Tab by mouth daily. 30 Tab 0    lisinopril (PRINIVIL, ZESTRIL) 20 mg tablet Take 20 mg by mouth daily.  guaiFENesin ER (MUCINEX) 600 mg ER tablet Take 600 mg by mouth two (2) times daily as needed for Congestion.  traMADol (ULTRAM) 50 mg tablet Take 50 mg by mouth daily. Indications: Left should pain      albuterol (PROVENTIL HFA, VENTOLIN HFA, PROAIR HFA) 90 mcg/actuation inhaler Take 2 Puffs by inhalation every four (4) hours as needed for Wheezing or Shortness of Breath. 1 Inhaler 0    albuterol (PROVENTIL VENTOLIN) 2.5 mg /3 mL (0.083 %) nebulizer solution 2.5 mg by Nebulization route every four (4) hours as needed for Wheezing.  budesonide-formoterol (SYMBICORT) 160-4.5 mcg/actuation HFA inhaler Take 2 Puffs by inhalation two (2) times a day.  calcium-vitamin D (OYSTER SHELL) 500 mg(1,250mg) -200 unit per tablet Take 1 Tab by mouth daily (with breakfast).  tiotropium (SPIRIVA WITH HANDIHALER) 18 mcg inhalation capsule Take 1 Cap by inhalation every evening. No Known Allergies  Past Medical History:   Diagnosis Date    Cancer ([address]     Lung CA    Chronic obstructive pulmonary disease (HCC)     Emphysema     Heart failure (HCC)      Past Surgical History:   Procedure Laterality Date    CHEST SURGERY PROCEDURE UNLISTED Bilateral 2007/2009    lung lobe resection    HX CHOLECYSTECTOMY      HX GYN      GEETA    HX ORTHOPAEDIC      shoulder    INS PPM/ICD LED SING ONLY  11/4/2016          No family history on file. Social History   Substance Use Topics    Smoking status: Former Smoker     Packs/day: 1.00     Years: 40.00     Quit date: 2/6/2017    Smokeless tobacco: Current User     Last attempt to quit: 10/26/2016    Alcohol use No        Review of Systems:   Constitutional: Negative for fever, chills, weight loss, malaise/fatigue. HEENT: Negative for nosebleeds, vision changes.    Respiratory: Negative for cough, hemoptysis, sputum production, and wheezing. Cardiovascular: Negative for chest pain, palpitations, orthopnea, claudication, leg swelling, syncope, and PND. +HUERTA  Gastrointestinal: Negative for nausea, vomiting, diarrhea, constipation, blood in stool and melena. Genitourinary: Negative for dysuria, and hematuria. Musculoskeletal: Negative for myalgias, arthralgia. Skin: Negative for rash. Heme: Does not bleed or bruise easily. Neurological: Negative for speech change and focal weakness     Objective:     Visit Vitals    /64 (BP 1 Location: Left arm, BP Patient Position: Sitting)    Pulse 87    Ht 5' 6.5\" (1.689 m)    Wt 123 lb (55.8 kg)    BMI 19.56 kg/m2      Physical Exam:   Constitutional: thin. No distress. Head: Normocephalic and atraumatic. Eyes: Pupils are equal, round  Neck: supple. No JVD present. Cardiovascular: Normal rate, regular rhythm. Exam reveals no gallop and no friction rub. No murmur heard. Pulmonary/Chest: Effort normal and breath sounds normal. No wheezes. Abdominal: Soft, no tenderness. Musculoskeletal: no edema. Neurological: alert,oriented. Skin: Skin is warm and dry. Left sided ICD scar unremarkable, device prominent as patient is thin. Psychiatric: normal mood and affect. Behavior is normal. Judgment and thought content normal.            Assessment/Plan:       ICD-10-CM ICD-9-CM    1. ICD (implantable cardioverter-defibrillator) in place Z95.810 V45.02    2. NICM (nonischemic cardiomyopathy) ([facility]) I42.9 425.4    3. H/O: lung cancer Z85. 118 V10.11    4. CHF NYHA class II (symptoms with moderately strenuous activities), chronic, systolic ([facility]) L59.84 711.83      428.0      Device check shows proper functioning, no ventricular arrhythmias detected. She is compensated on exam, NYHA class II, no acute s/s of CHF on exam. She is on GDMT. BP controlled.        Thank you for involving me in this patient's care and please call with further concerns or questions. Jesus Manuel Portillo M.D.   Electrophysiology/Cardiology  [facility] [place]  [facility] [address], [address] [place] [address]  [phone]

## 2017-02-27 NOTE — PROGRESS NOTES
Chief Complaint   Patient presents with    CHF    Cardiomyopathy    Follow-up     3 month follow up     Verified medications with patients medications list.  She uses the [place] for all of her prescription.

## 2017-02-27 NOTE — MR AVS SNAPSHOT
Visit Information Date & Time Provider Department Dept. Phone Encounter #  
 2/27/2017  1:00 PM Kristin Lakhani MD [facility] Christine Evangelista [phone] [id number] Your Appointments 4/19/2017  1:00 PM  
Follow Up with Piter Vicente MD  
[address] [facility]) [facility] [address]  
[phone]  
  
   
 [address] [place] [address] At [address] [address]  
  
    
 3/19/2018  1:45 PM  
PACEMAKER with Med Lopez [facility] (LANCE SCHEDULING) Appt Note: sjm icd, rc, annual thresh/M, see [address] [place]  
[address] [facility] [place]  
  
    
 3/19/2018  2:00 PM  
ESTABLISHED PATIENT with Kristin Lakhani MD  
[facility] ([facility]) Appt Note: sjm icd, rc, annual thresh/M, see [address] [id number]  
[address] [address]  
  
    
  
 6/6/2017  8:15 AM  
REMOTE OFFICE VISIT with Mary Koehler [facility] (LANCE SCHEDULING) Appt Note: sjm icd, rc b2/27/17  
 [address] [place]  
[address] [address]  
  
    
 9/12/2017 10:15 AM  
REMOTE OFFICE VISIT with Mary Koehler [facility] (LANCE SCHEDULING) Appt Note: sjm icd, rc  
 [address] [place]  
[phone]  
  
    
 12/19/2017 10:00 AM  
REMOTE OFFICE VISIT with Mary Koehler [facility] (LANCE SCHEDULING) Appt Note: sjm icd, rc  
 [address] [place]  
[phone] Upcoming Health Maintenance Date Due Hepatitis C Screening 1950 DTaP/Tdap/Td series (1 - Tdap) 10/20/1973 PAP AKA CERVICAL CYTOLOGY 10/20/1973 BREAST CANCER SCRN MAMMOGRAM 10/20/2002 FOBT Q 1 YEAR AGE 50-75 10/20/2002 Pneumococcal 19-64 Highest Risk (2 of 3 - PCV13) 11/21/2011 ZOSTER VACCINE AGE 60> 10/20/2012 Allergies as of 2/27/2017  Review Complete On: 2/27/2017 By: Fritz Us MD  
 No Known Allergies Current Immunizations  Reviewed on 12/30/2016 Name Date Influenza Vaccine 10/15/2014 Influenza Vaccine Earle Lobstein) 10/10/2016 Pneumococcal Vaccine (Unspecified Type) 11/21/2010 Not reviewed this visit You Were Diagnosed With   
  
 Codes Comments ICD (implantable cardioverter-defibrillator) in place    -  Primary ICD-10-CM: Z95.810 ICD-9-CM: V45.02   
 NICM (nonischemic cardiomyopathy) ([facility]     ICD-10-CM: I42.9 ICD-9-CM: 425.4 H/O: lung cancer     ICD-10-CM: Z85.118 
ICD-9-CM: V10.11   
 CHF NYHA class II (symptoms with moderately strenuous activities), chronic, systolic (HCC)     PTK-53-QX: I50.22 ICD-9-CM: 428.22, 428.0 Vitals BP  
  
  
  
  
  
 102/64 (BP 1 Location: Left arm, BP Patient Position: Sitting) Vitals History BMI and BSA Data Body Mass Index Body Surface Area  
 19.56 kg/m 2 1.62 m 2 Your Updated Medication List  
  
   
This list is accurate as of: 2/27/17  2:30 PM.  Always use your most recent med list.  
  
  
  
  
 * albuterol 2.5 mg /3 mL (0.083 %) nebulizer solution Commonly known as:  PROVENTIL VENTOLIN  
2.5 mg by Nebulization route every four (4) hours as needed for Wheezing. * albuterol 90 mcg/actuation inhaler Commonly known as:  PROVENTIL HFA, VENTOLIN HFA, PROAIR HFA Take 2 Puffs by inhalation every four (4) hours as needed for Wheezing or Shortness of Breath. aspirin 81 mg chewable tablet Take 1 Tab by mouth daily. azithromycin 250 mg tablet Commonly known as:  Vielka Sabot Take 250 mg by mouth daily. calcium-vitamin D 500 mg(1,250mg) -200 unit per tablet Commonly known as:  OYSTER SHELL Take 1 Tab by mouth daily (with breakfast). furosemide 20 mg tablet Commonly known as:  LASIX Take 1 Tab by mouth daily. lisinopril 20 mg tablet Commonly known as:  Aundra Candy Take 20 mg by mouth daily. metoprolol succinate 50 mg XL tablet Commonly known as:  TOPROL-XL Take 1 Tab by mouth daily. MUCINEX 600 mg ER tablet Generic drug:  guaiFENesin ER Take 600 mg by mouth two (2) times daily as needed for Congestion. predniSONE 10 mg dose pack Commonly known as:  STERAPRED DS See administration instruction per 10mg dose pack SPIRIVA WITH HANDIHALER 18 mcg inhalation capsule Generic drug:  tiotropium Take 1 Cap by inhalation every evening. spironolactone 25 mg tablet Commonly known as:  ALDACTONE Take 1 Tab by mouth daily. SYMBICORT 160-4.5 mcg/actuation HFA inhaler Generic drug:  budesonide-formoterol Take 2 Puffs by inhalation two (2) times a day. traMADol 50 mg tablet Commonly known as:  ULTRAM  
Take 50 mg by mouth daily. Indications: Left should pain * Notice: This list has 2 medication(s) that are the same as other medications prescribed for you. Read the directions carefully, and ask your doctor or other care provider to review them with you. Patient Instructions Return to see Dr. Chip Culp in 1 year. Introducing [facility]! Kitty Teran introduces [employer] patient portal. Now you can access parts of your medical record, email your doctor's office, and request medication refills online. 1. In your internet browser, go to [email or web address] 2. Click on the First Time User? Click Here link in the Sign In box. You will see the New Member Sign Up page. 3. Enter your [employer] Access Code exactly as it appears below. You will not need to use this code after youve completed the sign-up process.  If you do not sign up before the expiration date, you must request a new code. · [employer] Access Code: [email or web address] Expires: 5/28/2017  2:30 PM 
 
4. Enter the last four digits of your Social Security Number (xxxx) and Date of Birth (mm/dd/yyyy) as indicated and click Submit. You will be taken to the next sign-up page. 5. Create a [employer] ID. This will be your [employer] login ID and cannot be changed, so think of one that is secure and easy to remember. 6. Create a [employer] password. You can change your password at any time. 7. Enter your Password Reset Question and Answer. This can be used at a later time if you forget your password. 8. Enter your e-mail address. You will receive e-mail notification when new information is available in [address]. 9. Click Sign Up. You can now view and download portions of your medical record. 10. Click the Download Summary menu link to download a portable copy of your medical information. If you have questions, please visit the Frequently Asked Questions section of the [employer] website. Remember, [employer] is NOT to be used for urgent needs. For medical emergencies, dial 911. Now available from your iPhone and Android! Please provide this summary of care documentation to your next provider. Your primary care clinician is listed as Jerrod Garvin. If you have any questions after today's visit, please call [phone].

## 2017-02-27 NOTE — PROGRESS NOTES
Cardiac Electrophysiology Office Note     Subjective:      Jarred Fleming is a 61 y.o. female patient who is seen for management of single lead [facility] [place] ICD 11/4/16. Hx of non ischemic cardiomyopathy and AICD  She has LVEF 28-30% by stress test and cath while echo 40%  She has non obstructive CAD  Her echo in 2015 showed EF 40% too  She had been on optimal medications  She had lung cancer but no chemo and had surgery  [facility]     She is here today for follow up. She reports she has been okay, no change in SOB. She says her pulmonologist's started her on Zithromax (chronically). She says there have been studies that shows this is helpfully in decreasing COPD exacerbations and hospitalizations. She denies recent hospitalizations. No lightheadedness, dizziness, chest pain. No LE swelling. No syncope or falls. Her  is a , [facility]. She will have to get a new PCP and pulmonologist's by the end of he year. She is going to see a cardiologists at the [place], but wants to keep Dr. Roxann Carias as a physician d/t the above. Patient Active Problem List   Diagnosis Code    Hypoxia R09.02    Multiple thyroid nodules E04.2    Tobacco use disorder X15.845    Systolic heart failure (HCC) I50.20    S/P ICD (internal cardiac defibrillator) procedure Z95.810    Heart failure, left, with LVEF <=30% ([facility]) I50.1    H/O: lung cancer Z85. 80    NICM (nonischemic cardiomyopathy) ([facility]) I42.9     Current Outpatient Prescriptions   Medication Sig Dispense Refill    azithromycin (ZITHROMAX) 250 mg tablet Take 250 mg by mouth daily.  predniSONE (STERAPRED DS) 10 mg dose pack See administration instruction per 10mg dose pack 21 Tab 0    metoprolol succinate (TOPROL-XL) 50 mg XL tablet Take 1 Tab by mouth daily. 30 Tab 3    furosemide (LASIX) 20 mg tablet Take 1 Tab by mouth daily.  30 Tab 0    spironolactone (ALDACTONE) 25 mg tablet Take 1 Tab by mouth daily. 30 Tab 0    aspirin 81 mg chewable tablet Take 1 Tab by mouth daily. 30 Tab 0    lisinopril (PRINIVIL, ZESTRIL) 20 mg tablet Take 20 mg by mouth daily.  guaiFENesin ER (MUCINEX) 600 mg ER tablet Take 600 mg by mouth two (2) times daily as needed for Congestion.  traMADol (ULTRAM) 50 mg tablet Take 50 mg by mouth daily. Indications: Left should pain      albuterol (PROVENTIL HFA, VENTOLIN HFA, PROAIR HFA) 90 mcg/actuation inhaler Take 2 Puffs by inhalation every four (4) hours as needed for Wheezing or Shortness of Breath. 1 Inhaler 0    albuterol (PROVENTIL VENTOLIN) 2.5 mg /3 mL (0.083 %) nebulizer solution 2.5 mg by Nebulization route every four (4) hours as needed for Wheezing.  budesonide-formoterol (SYMBICORT) 160-4.5 mcg/actuation HFA inhaler Take 2 Puffs by inhalation two (2) times a day.  calcium-vitamin D (OYSTER SHELL) 500 mg(1,250mg) -200 unit per tablet Take 1 Tab by mouth daily (with breakfast).  tiotropium (SPIRIVA WITH HANDIHALER) 18 mcg inhalation capsule Take 1 Cap by inhalation every evening. No Known Allergies  Past Medical History:   Diagnosis Date    Cancer ([address]     Lung CA    Chronic obstructive pulmonary disease (HCC)     Emphysema     Heart failure (HCC)      Past Surgical History:   Procedure Laterality Date    CHEST SURGERY PROCEDURE UNLISTED Bilateral 2007/2009    lung lobe resection    HX CHOLECYSTECTOMY      HX GYN      GEETA    HX ORTHOPAEDIC      shoulder    INS PPM/ICD LED SING ONLY  11/4/2016          No family history on file. Social History   Substance Use Topics    Smoking status: Former Smoker     Packs/day: 1.00     Years: 40.00     Quit date: 2/6/2017    Smokeless tobacco: Current User     Last attempt to quit: 10/26/2016    Alcohol use No        Review of Systems:   Constitutional: Negative for fever, chills, weight loss, malaise/fatigue. HEENT: Negative for nosebleeds, vision changes.    Respiratory: Negative for cough, hemoptysis, sputum production, and wheezing. Cardiovascular: Negative for chest pain, palpitations, orthopnea, claudication, leg swelling, syncope, and PND. +HUERTA  Gastrointestinal: Negative for nausea, vomiting, diarrhea, constipation, blood in stool and melena. Genitourinary: Negative for dysuria, and hematuria. Musculoskeletal: Negative for myalgias, arthralgia. Skin: Negative for rash. Heme: Does not bleed or bruise easily. Neurological: Negative for speech change and focal weakness     Objective:     Visit Vitals    /64 (BP 1 Location: Left arm, BP Patient Position: Sitting)    Pulse 87    Ht 5' 6.5\" (1.689 m)    Wt 123 lb (55.8 kg)    BMI 19.56 kg/m2      Physical Exam:   Constitutional: thin. No distress. Head: Normocephalic and atraumatic. Eyes: Pupils are equal, round  Neck: supple. No JVD present. Cardiovascular: Normal rate, regular rhythm. Exam reveals no gallop and no friction rub. No murmur heard. Pulmonary/Chest: Effort normal and breath sounds normal. No wheezes. Abdominal: Soft, no tenderness. Musculoskeletal: no edema. Neurological: alert,oriented. Skin: Skin is warm and dry. Left sided ICD scar unremarkable, device prominent as patient is thin. Psychiatric: normal mood and affect. Behavior is normal. Judgment and thought content normal.            Assessment/Plan:       ICD-10-CM ICD-9-CM    1. ICD (implantable cardioverter-defibrillator) in place Z95.810 V45.02    2. NICM (nonischemic cardiomyopathy) ([facility]) I42.9 425.4    3. H/O: lung cancer Z85. 118 V10.11    4. CHF NYHA class II (symptoms with moderately strenuous activities), chronic, systolic ([facility]) M10.34 970.29      428.0      Device check shows proper functioning, no ventricular arrhythmias detected. She is compensated on exam, NYHA class II, no acute s/s of CHF on exam. She is on GDMT. BP controlled.    Patient said she has been involved in the clinic with Dr. Martha Rolon at [facility] in the Advanced Heart Failure Clinic. I will let her continue to follow up in the heart failure clinic and then depends on interrogation of ICD. If any arrhythmia, I will call her back for follow up before one year annual visit. Follow-up Disposition:  Return in about 1 year in person ICD check      Thank you for involving me in this patient's care and please call with further concerns or questions. Lyle Rao M.D.   Electrophysiology/Cardiology  [facility]  [address], [address] Jose M [address] [place] [address]  [phone]

## 2017-04-19 PROBLEM — J43.1 PANLOBULAR EMPHYSEMA (HCC): Status: ACTIVE | Noted: 2017-01-01

## 2017-04-19 NOTE — PROGRESS NOTES
HISTORY OF PRESENT ILLNESS    HPI  I saw Ms. Girard in follow up. Ms. Keiko Diaz is a 61year old woman with a history of a nonischemic cardiomyopathy and systolic heart failure. She also has significant COPD, and lung cancer with a prior left lobectomy. Since Ms. Keiko Diaz was last seen she has not had any ED or hospital visits. She denied chest pain and pressure. She has noted some random onset dizzy spells that are mild and not associated with falls, syncope or palpitations. She has noted stable exertional dyspnea. She is able to climb about 8 stair steps and walk about 200 feet prior to stopping. She denied edema, orthopnea (2 pillows for comfort) and PND. She has woken a few times with dyspnea when her O2 has come off. She has been exercising twice weekly for the last few weeks. Review of Systems   Constitutional: Negative for chills, fever, malaise/fatigue and weight loss. Eyes: Negative for blurred vision. Respiratory: Positive for cough, sputum production and wheezing. She has noted shortness of breath. Cardiovascular: Negative for chest pain, palpitations, orthopnea, leg swelling and PND. Positive for mild dizzy spells. Gastrointestinal: Negative for abdominal pain, constipation, diarrhea and nausea. Neurological: Negative for dizziness, tingling and headaches. Psychiatric/Behavioral: The patient does not have insomnia. Outpatient Encounter Prescriptions as of 4/19/2017   Medication Sig Dispense Refill    azithromycin (ZITHROMAX) 250 mg tablet Take 250 mg by mouth daily.  metoprolol succinate (TOPROL-XL) 50 mg XL tablet Take 1 Tab by mouth daily. 30 Tab 3    furosemide (LASIX) 20 mg tablet Take 1 Tab by mouth daily. 30 Tab 0    spironolactone (ALDACTONE) 25 mg tablet Take 1 Tab by mouth daily. 30 Tab 0    aspirin 81 mg chewable tablet Take 1 Tab by mouth daily. 30 Tab 0    lisinopril (PRINIVIL, ZESTRIL) 20 mg tablet Take 20 mg by mouth daily.       guaiFENesin ER ([address]) 600 mg ER tablet Take 600 mg by mouth two (2) times daily as needed for Congestion.  traMADol (ULTRAM) 50 mg tablet Take 50 mg by mouth daily. Indications: Left should pain      albuterol (PROVENTIL HFA, VENTOLIN HFA, PROAIR HFA) 90 mcg/actuation inhaler Take 2 Puffs by inhalation every four (4) hours as needed for Wheezing or Shortness of Breath. 1 Inhaler 0    albuterol (PROVENTIL VENTOLIN) 2.5 mg /3 mL (0.083 %) nebulizer solution 2.5 mg by Nebulization route every four (4) hours as needed for Wheezing.  budesonide-formoterol (SYMBICORT) 160-4.5 mcg/actuation HFA inhaler Take 2 Puffs by inhalation two (2) times a day.  calcium-vitamin D (OYSTER SHELL) 500 mg(1,250mg) -200 unit per tablet Take 1 Tab by mouth daily (with breakfast).  tiotropium (SPIRIVA WITH HANDIHALER) 18 mcg inhalation capsule Take 1 Cap by inhalation every evening.  predniSONE (STERAPRED DS) 10 mg dose pack See administration instruction per 10mg dose pack 21 Tab 0     No facility-administered encounter medications on file as of 4/19/2017. Patient Active Problem List   Diagnosis Code    Hypoxia R09.02    Multiple thyroid nodules E04.2    Tobacco use disorder G85.707    Systolic heart failure (HCC) I50.20    S/P ICD (internal cardiac defibrillator) procedure Z95.810    Heart failure, left, with LVEF <=30% ([facility]) I50.1    H/O: lung cancer Z85. 80    NICM (nonischemic cardiomyopathy) ([facility]) I42.8    Panlobular emphysema ([facility]) J43.1       Objective  Visit Vitals    /70 (BP 1 Location: Right arm, BP Patient Position: Sitting)    Pulse 82    Temp 98.9 °F (37.2 °C) (Oral)    Resp 20    Ht 5' 6\" (1.676 m)    Wt 120 lb (54.4 kg)    SpO2 94%  Comment: 92    BMI 19.37 kg/m2     Wt Readings from Last 3 Encounters:   04/19/17 120 lb (54.4 kg)   02/27/17 123 lb (55.8 kg)   01/18/17 123 lb 3.2 oz (55.9 kg)       Physical Exam   Constitutional: She is oriented to person, place, and time.  She appears well-developed. No distress. HENT:   Head: Normocephalic and atraumatic. Nose: Nose normal.   Eyes: EOM are normal. Pupils are equal, round, and reactive to light. Neck: Normal range of motion. Neck supple. No JVD present. Cardiovascular: Normal rate, regular rhythm, soft heart sounds and intact distal pulses. Pulmonary/Chest: Effort normal. No respiratory distress. She has no wheezes. She has no rales. Diminished   Abdominal: Soft. Bowel sounds are normal. She exhibits no distension. There is no tenderness. Musculoskeletal: No edema. Neurological: She is alert and oriented to person, place, and time. Skin: Skin is warm and dry. Psychiatric: She has a normal mood and affect. Her behavior is normal.       Lab Results   Component Value Date/Time    Sodium 141 12/30/2016 04:19 AM    Potassium 4.0 12/30/2016 04:19 AM    Chloride 105 12/30/2016 04:19 AM    CO2 28 12/30/2016 04:19 AM    Anion gap 8 12/30/2016 04:19 AM    Glucose 135 12/30/2016 04:19 AM    BUN 22 12/30/2016 04:19 AM    Creatinine 0.72 12/30/2016 04:19 AM    BUN/Creatinine ratio 31 12/30/2016 04:19 AM    GFR est AA >60 12/30/2016 04:19 AM    GFR est non-AA >60 12/30/2016 04:19 AM    Calcium 9.7 12/30/2016 04:19 AM     Lab Results   Component Value Date/Time    WBC 16.6 12/30/2016 04:19 AM    HGB 12.6 12/30/2016 04:19 AM    HCT 38.5 12/30/2016 04:19 AM    PLATELET 405 47/54/7838 04:19 AM    MCV 91.9 12/30/2016 04:19 AM       Cath 11/3/2016  SUMMARY:  --  CARDIAC STRUCTURES:  --  Analysis of regional contractile function demonstrated moderate global hypokinesis. --  Global left ventricular function was moderately depressed. EF estimated was 30%. --  CORONARY CIRCULATION:  --  Proximal LAD: There was a tubular 30 % stenosis. The lesion was eccentric. --  Proximal circumflex: There was a tubular 40 % stenosis. --  Proximal RCA: There was a discrete 50 % stenosis.     Stress MIBI 10/28/2016  IMPRESSION  Impression:   Fixed defect in the inferoapical wall concerning for small infarct. No reversible abnormality is seen to suggest myocardium at ischemic risk. Left ventricular dilatation. Left ventricular ejection fraction is 28%. Global Hypokinesia. Echo 10/27/2016  SUMMARY:  Left ventricle: Size was at the upper limits of normal. Systolic function was mildly to moderately reduced. Ejection fraction was estimated to be 40% in the range of 35% to 45 %. Suboptimal endocardial visualization limits wall motion analysis. There was moderate diffuse hypokinesis. Wall thickness was moderately increased. PFTs 10/31/2016  FVC  1.44 (42%)  FEV1  0.37 (14%0  FEV1/FVC 0.26 (33%)  CUF46-33 0.18 (8%)    ASSESSMENT and PLAN    NIC HFrEF 35-45% NYHA class II. She looked volume appropriate on examination. 1. She may be more short of breath lately. She denied allergy type complaints though has exposure to some sick contacts. She denied fever and chills or change in cough today. As her FEV1 is so poor with moderate LV dysfunction it is difficult to know how much of her dyspnea is heart and how much is lung. I did suggest some labs with a Mg and BNP. If her BNP is higher then adjusting her furosemide would be an option. 2. We asked her to continue her present ACEi and beta blocker. We will plan a follow up echo in the fall unless performed at the [place] sooner. If the EF has dropped we can consider Entresto. 3. Follow up in three months.          Tamra Benitez MD

## 2017-04-19 NOTE — MR AVS SNAPSHOT
Visit Information Date & Time Provider Department Dept. Phone Encounter #  
 4/19/2017  1:00 PM Heena Sommers MD [address] [id number] Follow-up Instructions Return in about 3 months (around 7/19/2017). Your Appointments 3/19/2018  1:45 PM  
PACEMAKER with [facility] (LANCE SCHEDULING) Appt Note: sjm icd, rc, annual thresh/M, see [address] [place]  
[address] [facility]  
  
    
 3/19/2018  2:00 PM  
ESTABLISHED PATIENT with John Espinoza MD  
[facility] ([facility]) Appt Note: sjm icd, rc, annual thresh/M, see [address] [facility] [address]  
[address] [facility] [address]  
  
    
  
 6/6/2017  8:15 AM  
REMOTE OFFICE VISIT with [facility] (LANCE SCHEDULING) Appt Note: sjm icd, rc b2/27/17  
 [address] [place]  
[address] [facility] [address]  
  
    
 9/12/2017 10:15 AM  
REMOTE OFFICE VISIT with [facility] (LANCE SCHEDULING) Appt Note: sjm icd, rc  
 [address] [place]  
[phone]  
  
    
 12/19/2017 10:00 AM  
REMOTE OFFICE VISIT with [facility] (LANCE SCHEDULING) Appt Note: sjm icd, rc  
 [address] [place]  
[phone] Upcoming Health Maintenance Date Due Hepatitis C Screening 1950 DTaP/Tdap/Td series (1 - Tdap) 10/20/1973 PAP AKA CERVICAL CYTOLOGY 10/20/1973 BREAST CANCER SCRN MAMMOGRAM 10/20/2002 FOBT Q 1 YEAR AGE 50-75 10/20/2002 Pneumococcal 19-64 Highest Risk (2 of 3 - PCV13) 11/21/2011 ZOSTER VACCINE AGE 60> 10/20/2012 Allergies as of 4/19/2017  Review Complete On: 4/19/2017 By: Maria E Morillo MD  
 No Known Allergies Current Immunizations  Reviewed on 12/30/2016 Name Date Influenza Vaccine 10/15/2014 Influenza Vaccine Devendra Jacinto) 10/10/2016 Pneumococcal Vaccine (Unspecified Type) 11/21/2010 Not reviewed this visit You Were Diagnosed With   
  
 Codes Comments Chronic systolic heart failure (HCC)    -  Primary ICD-10-CM: P40.21 ICD-9-CM: 428.22   
 NICM (nonischemic cardiomyopathy) ([facility]     ICD-10-CM: I42.8 ICD-9-CM: 425.4 Panlobular emphysema ([facility].)     ICD-10-CM: J43.1 ICD-9-CM: 492.8 Vitals BP Pulse Temp Resp Height(growth percentile) Weight(growth percentile) 115/70 (BP 1 Location: Right arm, BP Patient Position: Sitting) 82 98.9 °F (37.2 °C) (Oral) 20 5' 6\" (1.676 m) 120 lb (54.4 kg) SpO2 BMI OB Status Smoking Status 94% 19.37 kg/m2 Hysterectomy Former Smoker Vitals History BMI and BSA Data Body Mass Index Body Surface Area  
 19.37 kg/m 2 1.59 m 2 Your Updated Medication List  
  
   
This list is accurate as of: 4/19/17  2:29 PM.  Always use your most recent med list.  
  
  
  
  
 * albuterol 2.5 mg /3 mL (0.083 %) nebulizer solution Commonly known as:  PROVENTIL VENTOLIN  
2.5 mg by Nebulization route every four (4) hours as needed for Wheezing. * albuterol 90 mcg/actuation inhaler Commonly known as:  PROVENTIL HFA, VENTOLIN HFA, PROAIR HFA Take 2 Puffs by inhalation every four (4) hours as needed for Wheezing or Shortness of Breath. aspirin 81 mg chewable tablet Take 1 Tab by mouth daily. azithromycin 250 mg tablet Commonly known as:  Alisha Royal Take 250 mg by mouth daily. calcium-vitamin D 500 mg(1,250mg) -200 unit per tablet Commonly known as:  OYSTER SHELL Take 1 Tab by mouth daily (with breakfast). furosemide 20 mg tablet Commonly known as:  LASIX Take 1 Tab by mouth daily. lisinopril 20 mg tablet Commonly known as:  Rick Bold Take 20 mg by mouth daily. metoprolol succinate 50 mg XL tablet Commonly known as:  TOPROL-XL Take 1 Tab by mouth daily. MUCINEX 600 mg ER tablet Generic drug:  guaiFENesin ER Take 600 mg by mouth two (2) times daily as needed for Congestion. predniSONE 10 mg dose pack Commonly known as:  STERAPRED DS See administration instruction per 10mg dose pack SPIRIVA WITH HANDIHALER 18 mcg inhalation capsule Generic drug:  tiotropium Take 1 Cap by inhalation every evening. spironolactone 25 mg tablet Commonly known as:  ALDACTONE Take 1 Tab by mouth daily. SYMBICORT 160-4.5 mcg/actuation HFA inhaler Generic drug:  budesonide-formoterol Take 2 Puffs by inhalation two (2) times a day. traMADol 50 mg tablet Commonly known as:  ULTRAM  
Take 50 mg by mouth daily. Indications: Left should pain * Notice: This list has 2 medication(s) that are the same as other medications prescribed for you. Read the directions carefully, and ask your doctor or other care provider to review them with you. We Performed the Following BNP [id number] CPT(R)] METABOLIC PANEL, BASIC [46387 CPT(R)] Follow-up Instructions Return in about 3 months (around 7/19/2017). To-Do List   
 04/19/2017 Lab:  [facility] Patient Instructions Have labs drawn today Continue your present medications Continue the therapy Return visit in 3 months Introducing [facility] SERVICES! Brian Tierney introduces [employer] patient portal. Now you can access parts of your medical record, email your doctor's office, and request medication refills online. 1. In your internet browser, go to [email or web address] 2. Click on the First Time User? Click Here link in the Sign In box. You will see the New Member Sign Up page. 3. Enter your [employer]ubee Access Code exactly as it appears below. You will not need to use this code after youve completed the sign-up process. If you do not sign up before the expiration date, you must request a new code. · [employer]ubee Access Code: [email or web address] Expires: 5/28/2017  3:30 PM 
 
4. Enter the last four digits of your Social Security Number (xxxx) and Date of Birth (mm/dd/yyyy) as indicated and click Submit. You will be taken to the next sign-up page. 5. Create a Nubee ID. This will be your Nubee login ID and cannot be changed, so think of one that is secure and easy to remember. 6. Create a Nubee password. You can change your password at any time. 7. Enter your Password Reset Question and Answer. This can be used at a later time if you forget your password. 8. Enter your e-mail address. You will receive e-mail notification when new information is available in [address] 9. Click Sign Up. You can now view and download portions of your medical record. 10. Click the Download Summary menu link to download a portable copy of your medical information. If you have questions, please visit the Frequently Asked Questions section of the Nubee website. Remember, Nubee is NOT to be used for urgent needs. For medical emergencies, dial 911. Now available from your iPhone and Android! Please provide this summary of care documentation to your next provider. Your primary care clinician is listed as Yazan Tenorio. If you have any questions after today's visit, please call [phone].

## 2017-04-19 NOTE — PATIENT INSTRUCTIONS
Have labs drawn today    Continue your present medications    Continue the therapy    Return visit in 3 months

## 2017-04-19 NOTE — COMMUNICATION BODY
HISTORY OF PRESENT ILLNESS    HPI  I saw Ms. Girard in follow up. Ms. Kian Rhoades is a 61year old woman with a history of a nonischemic cardiomyopathy and systolic heart failure. She also has significant COPD, and lung cancer with a prior left lobectomy. Since Ms. Kian Rhoades was last seen she has not had any ED or hospital visits. She denied chest pain and pressure. She has noted some random onset dizzy spells that are mild and not associated with falls, syncope or palpitations. She has noted stable exertional dyspnea. She is able to climb about 8 stair steps and walk about 200 feet prior to stopping. She denied edema, orthopnea (2 pillows for comfort) and PND. She has woken a few times with dyspnea when her O2 has come off. She has been exercising twice weekly for the last few weeks. Review of Systems   Constitutional: Negative for chills, fever, malaise/fatigue and weight loss. Eyes: Negative for blurred vision. Respiratory: Positive for cough, sputum production and wheezing. She has noted shortness of breath. Cardiovascular: Negative for chest pain, palpitations, orthopnea, leg swelling and PND. Positive for mild dizzy spells. Gastrointestinal: Negative for abdominal pain, constipation, diarrhea and nausea. Neurological: Negative for dizziness, tingling and headaches. Psychiatric/Behavioral: The patient does not have insomnia. Outpatient Encounter Prescriptions as of 4/19/2017   Medication Sig Dispense Refill    azithromycin (ZITHROMAX) 250 mg tablet Take 250 mg by mouth daily.  metoprolol succinate (TOPROL-XL) 50 mg XL tablet Take 1 Tab by mouth daily. 30 Tab 3    furosemide (LASIX) 20 mg tablet Take 1 Tab by mouth daily. 30 Tab 0    spironolactone (ALDACTONE) 25 mg tablet Take 1 Tab by mouth daily. 30 Tab 0    aspirin 81 mg chewable tablet Take 1 Tab by mouth daily. 30 Tab 0    lisinopril (PRINIVIL, ZESTRIL) 20 mg tablet Take 20 mg by mouth daily.       guaiFENesin ER ([place] [address]) 600 mg ER tablet Take 600 mg by mouth two (2) times daily as needed for Congestion.  traMADol (ULTRAM) 50 mg tablet Take 50 mg by mouth daily. Indications: Left should pain      albuterol (PROVENTIL HFA, VENTOLIN HFA, PROAIR HFA) 90 mcg/actuation inhaler Take 2 Puffs by inhalation every four (4) hours as needed for Wheezing or Shortness of Breath. 1 Inhaler 0    albuterol (PROVENTIL VENTOLIN) 2.5 mg /3 mL (0.083 %) nebulizer solution 2.5 mg by Nebulization route every four (4) hours as needed for Wheezing.  budesonide-formoterol (SYMBICORT) 160-4.5 mcg/actuation HFA inhaler Take 2 Puffs by inhalation two (2) times a day.  calcium-vitamin D (OYSTER SHELL) 500 mg(1,250mg) -200 unit per tablet Take 1 Tab by mouth daily (with breakfast).  tiotropium (SPIRIVA WITH HANDIHALER) 18 mcg inhalation capsule Take 1 Cap by inhalation every evening.  predniSONE (STERAPRED DS) 10 mg dose pack See administration instruction per 10mg dose pack 21 Tab 0     No facility-administered encounter medications on file as of 4/19/2017. Patient Active Problem List   Diagnosis Code    Hypoxia R09.02    Multiple thyroid nodules E04.2    Tobacco use disorder D62.978    Systolic heart failure (HCC) I50.20    S/P ICD (internal cardiac defibrillator) procedure Z95.810    Heart failure, left, with LVEF <=30% (HCC) I50.1    H/O: lung cancer Z85. 80    NICM (nonischemic cardiomyopathy) ([facility]) I42.8    Panlobular emphysema ([facility]) J43.1       Objective  Visit Vitals    /70 (BP 1 Location: Right arm, BP Patient Position: Sitting)    Pulse 82    Temp 98.9 °F (37.2 °C) (Oral)    Resp 20    Ht 5' 6\" (1.676 m)    Wt 120 lb (54.4 kg)    SpO2 94%  Comment: 92    BMI 19.37 kg/m2     Wt Readings from Last 3 Encounters:   04/19/17 120 lb (54.4 kg)   02/27/17 123 lb (55.8 kg)   01/18/17 123 lb 3.2 oz (55.9 kg)       Physical Exam   Constitutional: She is oriented to person, place, and time.  She appears well-developed. No distress. HENT:   Head: Normocephalic and atraumatic. Nose: Nose normal.   Eyes: EOM are normal. Pupils are equal, round, and reactive to light. Neck: Normal range of motion. Neck supple. No JVD present. Cardiovascular: Normal rate, regular rhythm, soft heart sounds and intact distal pulses. Pulmonary/Chest: Effort normal. No respiratory distress. She has no wheezes. She has no rales. Diminished   Abdominal: Soft. Bowel sounds are normal. She exhibits no distension. There is no tenderness. Musculoskeletal: No edema. Neurological: She is alert and oriented to person, place, and time. Skin: Skin is warm and dry. Psychiatric: She has a normal mood and affect. Her behavior is normal.       Lab Results   Component Value Date/Time    Sodium 141 12/30/2016 04:19 AM    Potassium 4.0 12/30/2016 04:19 AM    Chloride 105 12/30/2016 04:19 AM    CO2 28 12/30/2016 04:19 AM    Anion gap 8 12/30/2016 04:19 AM    Glucose 135 12/30/2016 04:19 AM    BUN 22 12/30/2016 04:19 AM    Creatinine 0.72 12/30/2016 04:19 AM    BUN/Creatinine ratio 31 12/30/2016 04:19 AM    GFR est AA >60 12/30/2016 04:19 AM    GFR est non-AA >60 12/30/2016 04:19 AM    Calcium 9.7 12/30/2016 04:19 AM     Lab Results   Component Value Date/Time    WBC 16.6 12/30/2016 04:19 AM    HGB 12.6 12/30/2016 04:19 AM    HCT 38.5 12/30/2016 04:19 AM    PLATELET 232 25/96/8145 04:19 AM    MCV 91.9 12/30/2016 04:19 AM       Cath 11/3/2016  SUMMARY:  --  CARDIAC STRUCTURES:  --  Analysis of regional contractile function demonstrated moderate global hypokinesis. --  Global left ventricular function was moderately depressed. EF estimated was 30%. --  CORONARY CIRCULATION:  --  Proximal LAD: There was a tubular 30 % stenosis. The lesion was eccentric. --  Proximal circumflex: There was a tubular 40 % stenosis. --  Proximal RCA: There was a discrete 50 % stenosis.     Stress MIBI 10/28/2016  IMPRESSION  Impression:   Fixed defect in the inferoapical wall concerning for small infarct. No reversible abnormality is seen to suggest myocardium at ischemic risk. Left ventricular dilatation. Left ventricular ejection fraction is 28%. Global Hypokinesia. Echo 10/27/2016  SUMMARY:  Left ventricle: Size was at the upper limits of normal. Systolic function was mildly to moderately reduced. Ejection fraction was estimated to be 40% in the range of 35% to 45 %. Suboptimal endocardial visualization limits wall motion analysis. There was moderate diffuse hypokinesis. Wall thickness was moderately increased. PFTs 10/31/2016  FVC  1.44 (42%)  FEV1  0.37 (14%0  FEV1/FVC 0.26 (33%)  [id number] 0.18 (8%)    ASSESSMENT and PLAN    NIC HFrEF 35-45% NYHA class II. She looked volume appropriate on examination. 1. She may be more short of breath lately. She denied allergy type complaints though has exposure to some sick contacts. She denied fever and chills or change in cough today. As her FEV1 is so poor with moderate LV dysfunction it is difficult to know how much of her dyspnea is heart and how much is lung. I did suggest some labs with a Mg and BNP. If her BNP is higher then adjusting her furosemide would be an option. 2. We asked her to continue her present ACEi and beta blocker. We will plan a follow up echo in the fall unless performed at the [place] sooner. If the EF has dropped we can consider Entresto. 3. Follow up in three months.          Esteban Asencio MD

## 2017-07-18 NOTE — TELEPHONE ENCOUNTER
Patient called to reschedule her follow up appointment for tomorrow. She is currently having chemo and wanted to rescheduled until October. Appointment is scheduled for Tuesday October 3rd at 1pm with Dr. Grace Obrien.

## 2017-08-02 PROBLEM — R06.02 SOB (SHORTNESS OF BREATH): Status: ACTIVE | Noted: 2017-01-01

## 2017-08-02 PROBLEM — J20.9 ACUTE BRONCHITIS: Status: ACTIVE | Noted: 2017-01-01

## 2017-08-02 PROBLEM — E86.0 DEHYDRATION: Status: ACTIVE | Noted: 2017-01-01

## 2017-08-02 NOTE — IP AVS SNAPSHOT
[address] 
[phone] Patient: Aniyah Ramírez MRN: [id number] VCD:[id number]/42/[id number] Current Discharge Medication List  
  
CONTINUE these medications which have NOT CHANGED Dose & Instructions Dispensing Information Comments Morning Noon Evening Bedtime * albuterol 2.5 mg /3 mL (0.083 %) nebulizer solution Commonly known as:  PROVENTIL VENTOLIN Your last dose was: Your next dose is:    
   
   
 Dose:  2.5 mg  
2.5 mg by Nebulization route every four (4) hours as needed for Wheezing. Refills:  0  
     
   
   
   
  
 * albuterol 90 mcg/actuation inhaler Commonly known as:  PROVENTIL HFA, VENTOLIN HFA, PROAIR HFA Your last dose was: Your next dose is:    
   
   
 Dose:  2 Puff Take 2 Puffs by inhalation every six (6) hours as needed for Wheezing. Refills:  0  
     
   
   
   
  
 aspirin 81 mg chewable tablet Your last dose was: Your next dose is:    
   
   
 Dose:  81 mg Take 1 Tab by mouth daily. Quantity:  30 Tab Refills:  0  
     
   
   
   
  
 azithromycin 250 mg tablet Commonly known as:  Olevia Adi Your last dose was: Your next dose is:    
   
   
 Dose:  250 mg Take 250 mg by mouth daily. Refills:  0  
     
   
   
   
  
 dexamethasone 4 mg tablet Commonly known as:  DECADRON Your last dose was: Your next dose is:    
   
   
 Dose:  8 mg Take 8 mg by mouth daily. Takes 8mg once daily the night before chemo, day 2 of chemo, and day 3 of chemo. Refills:  0  
     
   
   
   
  
 furosemide 20 mg tablet Commonly known as:  LASIX Your last dose was: Your next dose is:    
   
   
 Dose:  20 mg Take 1 Tab by mouth daily. Quantity:  30 Tab Refills:  0  
     
   
   
   
  
 lisinopril 20 mg tablet Commonly known as:  Fernie Handing Your last dose was: Your next dose is:    
   
   
 Dose:  20 mg Take 20 mg by mouth daily. Refills:  0  
     
   
   
   
  
 metoprolol succinate 50 mg XL tablet Commonly known as:  TOPROL-XL Your last dose was: Your next dose is:    
   
   
 Dose:  50 mg Take 1 Tab by mouth daily. Quantity:  30 Tab Refills:  3  
     
   
   
   
  
 ondansetron hcl 8 mg tablet Commonly known as:  Anna Marie Dieter Your last dose was: Your next dose is:    
   
   
 Dose:  8 mg Take 8 mg by mouth every eight (8) hours as needed for Nausea. Refills:  0  
     
   
   
   
  
 prochlorperazine 10 mg tablet Commonly known as:  COMPAZINE Your last dose was: Your next dose is:    
   
   
 Dose:  10 mg Take 10 mg by mouth every six (6) hours as needed for Nausea. Refills:  0 SPIRIVA WITH HANDIHALER 18 mcg inhalation capsule Generic drug:  tiotropium Your last dose was: Your next dose is:    
   
   
 Dose:  1 Cap Take 1 Cap by inhalation every evening. Refills:  0  
     
   
   
   
  
 spironolactone 25 mg tablet Commonly known as:  ALDACTONE Your last dose was: Your next dose is:    
   
   
 Dose:  25 mg Take 1 Tab by mouth daily. Quantity:  30 Tab Refills:  0  
     
   
   
   
  
 SYMBICORT 160-4.5 mcg/actuation HFA inhaler Generic drug:  budesonide-formoterol Your last dose was: Your next dose is:    
   
   
 Dose:  2 Puff Take 2 Puffs by inhalation two (2) times a day. Refills:  0  
     
   
   
   
  
 traMADol 50 mg tablet Commonly known as:  ULTRAM  
   
Your last dose was: Your next dose is:    
   
   
 Dose:  50 mg Take 50 mg by mouth every six (6) hours as needed for Pain. Refills:  0  
     
   
   
   
  
 * Notice: This list has 2 medication(s) that are the same as other medications prescribed for you.  Read the directions carefully, and ask your doctor or other care provider to review them with you.

## 2017-08-02 NOTE — ED PROVIDER NOTES
HPI Comments: 61 y.o. female with past medical history significant for lung CA, COPD, emphysema, and heart failure who presents from home via EMS, accompanied by  and daughter, with chief complaint of SOB. Pt complains of SOB with associated productive cough (white sputum), and chest congestion worsening since chemotherapy 7/31/2017, 2 days PTA. Per , today was pt's worst day since treatment 2 days ago. Pt mentions some general myalgias post chemo, but reports no pain today. Pt reports chemo 6/19/2017 and 7/10/2017 with no reactions. Pt is regularly on 2L O2 at home. Pt is followed for lung CA at the [facility] , but today pt was referred to [address] [place] [address] ED because the [facility] was on divergence. Pt denies recent course of abx. Pt denies recent surgery. Pt denies fever, chills, n/v/d. There are no other acute medical concerns at this time. Social hx: Former tobacco smoker (40 pack years)  PCP: Jenna Olson MD    Note written by Candida. Candis Gil, as dictated by Isidro Leavitt MD 4:56 PM      The history is provided by the patient. No  was used. Past Medical History:   Diagnosis Date    Cancer [facility])     Lung CA    Chronic obstructive pulmonary disease (HCC)     Emphysema     Heart failure (HCC)        Past Surgical History:   Procedure Laterality Date    CHEST SURGERY PROCEDURE UNLISTED Bilateral 2007/2009    lung lobe resection    HX CHOLECYSTECTOMY      HX GYN      GEETA    HX ORTHOPAEDIC      shoulder    HX OTHER SURGICAL      pacemaker/defib    INS PPM/ICD LED SING ONLY  11/4/2016              History reviewed. No pertinent family history. Social History     Social History    Marital status:      Spouse name: N/A    Number of children: N/A    Years of education: N/A     Occupational History    Not on file.      Social History Main Topics    Smoking status: Former Smoker     Packs/day: 1.00     Years: 40.00     Quit date: 2/6/2017    Smokeless tobacco: Current User     Last attempt to quit: 10/26/2016    Alcohol use No    Drug use: No    Sexual activity: Not on file     Other Topics Concern    Not on file     Social History Narrative         ALLERGIES: Review of patient's allergies indicates no known allergies. Review of Systems   Constitutional: Negative for activity change, appetite change and fatigue. HENT: Negative for ear pain, facial swelling, sore throat and trouble swallowing. Eyes: Negative for pain, discharge and visual disturbance. Respiratory: Positive for cough, chest tightness and shortness of breath. Negative for wheezing. Cardiovascular: Negative for chest pain and palpitations. Gastrointestinal: Negative for abdominal pain, blood in stool, nausea and vomiting. Genitourinary: Negative for difficulty urinating, flank pain and hematuria. Musculoskeletal: Negative for arthralgias, joint swelling, myalgias and neck pain. Skin: Negative for color change and rash. Neurological: Negative for dizziness, weakness, numbness and headaches. Hematological: Negative for adenopathy. Does not bruise/bleed easily. Psychiatric/Behavioral: Negative for behavioral problems, confusion and sleep disturbance. All other systems reviewed and are negative. Vitals:    08/02/17 1621   BP: 122/61   Pulse: (!) 112   Resp: 22   Temp: 98.8 °F (37.1 °C)   SpO2: 98%   Weight: 53.5 kg (118 lb)   Height: 5' 6\" (1.676 m)            Physical Exam   Constitutional: She is oriented to person, place, and time. She appears well-developed and well-nourished. No distress. HENT:   Head: Normocephalic and atraumatic. Nose: Nose normal.   Mouth/Throat: Oropharynx is clear and moist.   Eyes: Conjunctivae and EOM are normal. Pupils are equal, round, and reactive to light. No scleral icterus. Neck: Normal range of motion. Neck supple. No JVD present. No tracheal deviation present. No thyromegaly present. No carotid bruits noted.    Cardiovascular: Normal rate, regular rhythm, normal heart sounds and intact distal pulses. Exam reveals no gallop and no friction rub. No murmur heard. Pulmonary/Chest: Effort normal and breath sounds normal. No respiratory distress. She has no wheezes. She has no rales. She exhibits no tenderness. Pt has rattling cough and coarse rhonchi b/l consolidated    Abdominal: Soft. Bowel sounds are normal. She exhibits no distension and no mass. There is no tenderness. There is no rebound and no guarding. Musculoskeletal: Normal range of motion. She exhibits no edema or tenderness. Lymphadenopathy:     She has no cervical adenopathy. Neurological: She is alert and oriented to person, place, and time. She has normal reflexes. No cranial nerve deficit. Coordination normal.   Skin: Skin is warm and dry. No rash noted. No erythema. Psychiatric: She has a normal mood and affect. Her behavior is normal. Judgment and thought content normal.   Nursing note and vitals reviewed. Note written by Candida. Merino Light, as dictated by Cullen Kendall MD 6:16 PM        MDM  Number of Diagnoses or Management Options  Acute bronchitis, unspecified organism: new and requires workup     Amount and/or Complexity of Data Reviewed  Clinical lab tests: ordered and reviewed  Tests in the radiology section of CPT®: ordered and reviewed  Decide to obtain previous medical records or to obtain history from someone other than the patient: yes  Obtain history from someone other than the patient: yes  Review and summarize past medical records: yes  Discuss the patient with other providers: yes  Independent visualization of images, tracings, or specimens: yes      ED Course       Procedures  ED EKG interpretation:  Rhythm: normal sinus rhythm; Rate (approx.): 115; Axis: normal; Intervals: normal; non-specific ST changes; no ectopy. Note written by Candida.  Merino Light, as dictated by Cullen Kendall MD 4:47 PM    CT was ordered per request of the patient's oncologist at the [facility]. Is negative for any acute process. PROGRESS NOTE:  6:58 PM  Will consult hospitalist about admission d/t pt being persistently tachycardic at 110-115 in the ED and elevated white count at 14,500. Concerned about pt developing pneumonia. CONSULT NOTE:  7:19 PM Richi Redmond MD spoke with Dr. Diamond Frost, Consult for Hospitalist.  Discussed available diagnostic tests and clinical findings. He does not recommend admission d/t negative CT, no evidence of PE, and no pneumonia. Plan will be to discharge pt home with albuterol, abx, and close follow up with oncologist at the [place].

## 2017-08-02 NOTE — IP AVS SNAPSHOT
[address] 
[phone] Patient: Emmanuelle Luis MRN: [id number] LIT:[id number] You are allergic to the following No active allergies Recent Documentation Height Weight BMI OB Status Smoking Status 1.676 m 52.9 kg 18.82 kg/m2 Hysterectomy Former Smoker Unresulted Labs Order Current Status CULTURE, BLOOD, PAIRED Preliminary result Emergency Contacts Name Discharge Info Relation Home Work Mobile Arn Kocher CAREGIVER [3] Spouse [3] [phone] Leanna Daly  Child [2] [phone] About your hospitalization You were admitted on:  August 2, 2017 You last received care in the:  [facility] 4 IMCU 2 You were discharged on:  August 4, 2017 Unit phone number:  [phone] Why you were hospitalized Your primary diagnosis was:  Acute Bronchitis Your diagnoses also included:  Dehydration, Sob (Shortness Of Breath) Providers Seen During Your Hospitalizations Provider Role Specialty Primary office phone Maricel Avalos MD Attending Provider Emergency Medicine [phone] Bonilla Storm MD Attending Provider Hospitalist [phone] Barrie Lynn MD Attending Provider Internal Medicine [phone] Your Primary Care Physician (PCP) Primary Care Physician Office Phone Office Fax Chris Jaime [phone] Follow-up Information Follow up With Details Comments Contact Info Lamonte Veliz MD In 1 week Andretony Cruz office will contact you to make a follow up appointment [facility] 7 [address] [phone] Katarina Kaur MD In 1 week  [address] [place] [address] [facility] [address] [place] [id number] 
[phone] Your Appointments  Tuesday September 12, 2017 10:15 AM EDT  
REMOTE OFFICE VISIT with Peter Gallegos  
 [facility] ([facility] SCHEDULING) [address] [facility]  
[phone] Current Discharge Medication List  
  
CONTINUE these medications which have NOT CHANGED Dose & Instructions Dispensing Information Comments Morning Noon Evening Bedtime * albuterol 2.5 mg /3 mL (0.083 %) nebulizer solution Commonly known as:  PROVENTIL VENTOLIN Your last dose was: Your next dose is:    
   
   
 Dose:  2.5 mg  
2.5 mg by Nebulization route every four (4) hours as needed for Wheezing. Refills:  0  
     
   
   
   
  
 * albuterol 90 mcg/actuation inhaler Commonly known as:  PROVENTIL HFA, VENTOLIN HFA, PROAIR HFA Your last dose was: Your next dose is:    
   
   
 Dose:  2 Puff Take 2 Puffs by inhalation every six (6) hours as needed for Wheezing. Refills:  0  
     
   
   
   
  
 aspirin 81 mg chewable tablet Your last dose was: Your next dose is:    
   
   
 Dose:  81 mg Take 1 Tab by mouth daily. Quantity:  30 Tab Refills:  0  
     
   
   
   
  
 azithromycin 250 mg tablet Commonly known as:  Isis Ates Your last dose was: Your next dose is:    
   
   
 Dose:  250 mg Take 250 mg by mouth daily. Refills:  0  
     
   
   
   
  
 dexamethasone 4 mg tablet Commonly known as:  DECADRON Your last dose was: Your next dose is:    
   
   
 Dose:  8 mg Take 8 mg by mouth daily. Takes 8mg once daily the night before chemo, day 2 of chemo, and day 3 of chemo. Refills:  0  
     
   
   
   
  
 furosemide 20 mg tablet Commonly known as:  LASIX Your last dose was: Your next dose is:    
   
   
 Dose:  20 mg Take 1 Tab by mouth daily. Quantity:  30 Tab Refills:  0  
     
   
   
   
  
 lisinopril 20 mg tablet Commonly known as:  Helon Estrin Your last dose was: Your next dose is: Dose:  20 mg Take 20 mg by mouth daily. Refills:  0  
     
   
   
   
  
 metoprolol succinate 50 mg XL tablet Commonly known as:  TOPROL-XL Your last dose was: Your next dose is:    
   
   
 Dose:  50 mg Take 1 Tab by mouth daily. Quantity:  30 Tab Refills:  3  
     
   
   
   
  
 ondansetron hcl 8 mg tablet Commonly known as:  Nolia Folk Your last dose was: Your next dose is:    
   
   
 Dose:  8 mg Take 8 mg by mouth every eight (8) hours as needed for Nausea. Refills:  0  
     
   
   
   
  
 prochlorperazine 10 mg tablet Commonly known as:  COMPAZINE Your last dose was: Your next dose is:    
   
   
 Dose:  10 mg Take 10 mg by mouth every six (6) hours as needed for Nausea. Refills:  0 SPIRIVA WITH HANDIHALER 18 mcg inhalation capsule Generic drug:  tiotropium Your last dose was: Your next dose is:    
   
   
 Dose:  1 Cap Take 1 Cap by inhalation every evening. Refills:  0  
     
   
   
   
  
 spironolactone 25 mg tablet Commonly known as:  ALDACTONE Your last dose was: Your next dose is:    
   
   
 Dose:  25 mg Take 1 Tab by mouth daily. Quantity:  30 Tab Refills:  0  
     
   
   
   
  
 SYMBICORT 160-4.5 mcg/actuation HFA inhaler Generic drug:  budesonide-formoterol Your last dose was: Your next dose is:    
   
   
 Dose:  2 Puff Take 2 Puffs by inhalation two (2) times a day. Refills:  0  
     
   
   
   
  
 traMADol 50 mg tablet Commonly known as:  ULTRAM  
   
Your last dose was: Your next dose is:    
   
   
 Dose:  50 mg Take 50 mg by mouth every six (6) hours as needed for Pain. Refills:  0  
     
   
   
   
  
 * Notice: This list has 2 medication(s) that are the same as other medications prescribed for you.  Read the directions carefully, and ask your doctor or other care provider to review them with you. Discharge Instructions None Discharge Orders None [employer] Announcement We are excited to announce that we are making your provider's discharge notes available to you in [email or web address]. You will see these notes when they are completed and signed by the physician that discharged you from your recent hospital stay. If you have any questions or concerns about any information you see in [email or web address], please call the Health Information Department where you were seen or reach out to your Primary Care Provider for more information about your plan of care. Introducing [facility]! Dwayne German introduces [email or web address] patient portal. Now you can access parts of your medical record, email your doctor's office, and request medication refills online. 1. In your internet browser, go to [email or web address] 2. Click on the First Time User? Click Here link in the Sign In box. You will see the New Member Sign Up page. 3. Enter your [email or web address] Access Code exactly as it appears below. You will not need to use this code after youve completed the sign-up process. If you do not sign up before the expiration date, you must request a new code. · [email or web address] Access Code: [email or web address] Expires: 9/4/2017  9:32 AM 
 
4. Enter the last four digits of your Social Security Number (xxxx) and Date of Birth (mm/dd/yyyy) as indicated and click Submit. You will be taken to the next sign-up page. 5. Create a [email or web address] ID. This will be your [email or web address] login ID and cannot be changed, so think of one that is secure and easy to remember. 6. Create a [email or web address] password. You can change your password at any time. 7. Enter your Password Reset Question and Answer. This can be used at a later time if you forget your password. 8. Enter your e-mail address. You will receive e-mail notification when new information is available in [address] 9. Click Sign Up. You can now view and download portions of your medical record. 10. Click the Download Summary menu link to download a portable copy of your medical information. If you have questions, please visit the Frequently Asked Questions section of the [employer] website. Remember, [employer] is NOT to be used for urgent needs. For medical emergencies, dial 911. Now available from your iPhone and Android! General Information Please provide this summary of care documentation to your next provider. Patient Signature:  ____________________________________________________________ Date:  ____________________________________________________________  
  
Rosie Cardona Provider Signature:  ____________________________________________________________ Date:  ____________________________________________________________

## 2017-08-02 NOTE — ED TRIAGE NOTES
Triage note; pt arrives via EMS from home for SOB since this morning. Pt received chemo on Monday and hasn't felt well since then. Pt has hx of Lung CA, COPD, Emphysema and CHF. Pt is treated for CA at the [place].

## 2017-08-03 NOTE — CDMP QUERY
Please clarify if this patient is being treated/managed for:    =>Chronic Systolic CHF in the setting of known history requiring monitoring   =>Other Explanation of clinical findings  =>Unable to Determine (no explanation of clinical findings)    The medical record reflects the following clinical findings, treatment, and risk factors:    Risk Factors: HX of CHF   Clinical Indicators: H/P--\"Chronic heart failure with ejection fraction of 40%. \"  Treatment: cardiac monitoring     Please clarify and document your clinical opinion in the progress notes and discharge summary including the definitive and/or presumptive diagnosis, (suspected or probable), related to the above clinical findings. Please include clinical findings supporting your diagnosis.     Thank you,         Denny Service [facility]

## 2017-08-03 NOTE — PROGRESS NOTES
Care Management Interventions  PCP Verified by CM: Yes (Joni Vargas MD)  Mode of Transport at Discharge: Other (see comment)  Transition of Care Consult (CM Consult):  (None)  MyChart Signup: No  Discharge Durable Medical Equipment: No  Physical Therapy Consult: Yes  Occupational Therapy Consult: Yes  Speech Therapy Consult: No  Current Support Network: Lives with Spouse  Confirm Follow Up Transport: Family  Plan discussed with Pt/Family/Caregiver: Yes  Freedom of Choice Offered:  (N/A)  Discharge Location  Discharge Placement: Home with family assistance    CM met with patient. Patient lives with her  [place]. Patient has a daughter who lives locally. Patient reports good family /social support. Patient is ambulatory and expects return to independent functioning given limitation of home oxygen. Oxygen provider is [facility]. Patient confirmed PCP, health insurance, and prescription coverage. Transport at discharge will be provided by family. No discharge planning needs presented at this time.

## 2017-08-03 NOTE — H&P
[facility] [address]   HISTORY AND PHYSICAL       Name:  Jovan Rogers   MR#:  [id number]   :  1950   Account #:  [de-identified]        Date of Adm:  2017       PRIMARY CARE PHYSICIAN: Jose Vela. Nelida Patino MD    PRESENTING COMPLAINT: Shortness of breath. HISTORY OF PRESENTING COMPLAINT: The patient is a 64-year-  old female with history of recurrent lung cancer, who presented to the   emergency room with complaints of shortness of breath that started   today. The patient also has history of COPD, emphysema and   hypoxia, for which she uses her home oxygen at 2 liters via nasal   cannula. The patient was accompanied by her  and daughter. There is associated cough that is productive of whitish phlegm and   cough has been ongoing since Monday following more   chemotherapy. The patient is on chemotherapy at the [place]. The last time   she received chemo was 2017. According to the , the   patient has been coughing since the last chemotherapy. There is no   nasal congestion, sore throat, epistaxis or rhinorrhea. The patient   reports generalized myalgias status post chemo. She denied any pain. She also denied chest pain. Shortness of breath is not associated with   PND or orthopnea. The patient has no diaphoresis, wheezing or   rhonchi. She denied chest pain, chest tightness. The patient reported   that she has had chemo on 2 previous occasions, 2017 and   07/10/2017 with no reactions. The patient is followed for lung cancer at   the [place], but today the patient was referred to [facility]   Emergency Room because the [place] is on diversion. The patient denies   fever, chills or rigors. She also denied nausea, vomiting, abdominal   pain, constipation or diarrhea. There is no report of dysuria, frequency,   hematuria, urethral discharge or vaginal discharge.  The patient has no   headache, lightheadedness, dizziness, blurry vision, double vision,   syncopal episode or seizures. She denied neck pain, neck stiffness, or   confusion. She also denied back pain or flank pain. There is no report   of loss of appetite, dysphagia, odynophagia. The patient quit smoking   about 3 months ago after smoking 1 pack of cigarettes a day for more   than 40 years. She has had recurrent lung cancer status post surgery   x2 in the past, 2005 and 2007. When the patient presented to the   emergency room, a CT of the chest was unremarkable for PE. The   patient was found to be tachycardic with leukocytosis and dehydration. She was started on Levaquin and nebulizer treatment after blood   cultures have been drawn. I have discussed with ED physician. PAST MEDICAL HISTORY   1. Recurrent lung cancer. 2. COPD/emphysema. 3. Chronic hypoxia, on home oxygen at 2 L via nasal cannula. 4. Heart failure. PAST SURGICAL HISTORY   1. Bilateral lung lobe resection in 2007/2009. 2. Cholecystectomy. 3. Total abdominal hysterectomy. 4. Shoulder surgery. 5. Pacemaker/defibrillator placement. MEDICATIONS   1. Albuterol inhaler 2 puffs inhalation every 4 hours as needed for   wheezing or shortness of breath. 2. Albuterol nebulizer 4 times as needed for wheezing. 3. Aspirin 81 mg daily. 4. Symbicort 2 puff inhalation 2 times daily. 5. Calcium with vitamin D 1 tablet daily. 6. Furosemide 20 mg daily. 7. Guaifenesin 600 mg 2 times daily as needed for congestion. 8. Lisinopril 20 mg daily. 9. Metoprolol 50 mg 1 tablet daily. 10. Spironolactone 25 mg daily. 11. Tiotropium 18 mcg inhalation every evening. 12. Tramadol 50 mg daily. ALLERGIES: NO KNOWN DRUG ALLERGIES. SOCIAL HISTORY: The patient is , lives with spouse. She quit   smoking about 3 months ago, before quitting the patient was smoking   1 pack of cigarettes a day. She denies alcohol or recreational drug   use. FAMILY HISTORY: Reviewed, but noncontributory.     REVIEW OF SYSTEMS: More than 12 systems reviewed, and the   pertinent positives are in the history of presenting complaint, all others   are negative. PHYSICAL EXAMINATION   GENERAL: The patient is seen lying in bed on oxygen via nasal   cannula. VITAL SIGNS: Blood pressure 122/61, pulse 112, respirations 22,   temperature 98.8, pulse oximetry 98% on oxygen. HEAD, EARS, NOSE AND THROAT: Atraumatic, normocephalic,   anicteric, not pale, not jaundiced. Extraocular muscles intact. Pupils   bilaterally reactive, equal, round. NECK: Supple, no JVD, no carotid bruit. HEART: Sounds 1 and 2, tachycardic. No gallop, no friction, no rub. RESPIRATIONS: Decreased air entry bilaterally. No wheezing, no   rales, no rhonchi. Transmitted sound. ABDOMEN: Soft, nontender. Bowel sounds normoactive. NEUROLOGIC: Alert, oriented x3. Cranial nerves 2-12 intact. SKIN: Warm, dry, normal skin color, normal skin turgor. Ecchymosis   with diffuse bruising. BACK: No CVA tenderness. EXTREMITIES: No edema, no cyanosis. Normal range of motion. PSYCHIATRIC: Normal mood, normal affect. DIAGNOSTIC TESTS: EKG showed sinus tachycardia at 115 beats   per minute. WBC 14.5, hemoglobin 10.4, hematocrit 31.2, platelets   279, neutrophils 90. Sodium 135, potassium 4, chloride 101, carbon   dioxide 26, glucose 108, BUN 20, creatinine 1, calcium 8.9. ALT 20,   AST 16, alkaline phosphatase 84. Lactic acid 0.9. CT of the chest showed:   1. No pulmonary embolism. 2. There is no aortic aneurysm or dissection. 3. Severe centrilobular emphysematous change and stable small   nodules. 4. Minimal new atelectasis/ground-glass opacity. ASSESSMENT   1. Shortness of breath. 2. Acute bronchitis. 3. Chronic obstructive pulmonary disease exacerbation. 4. Dehydration. 5. Recurrent lung cancer, currently on chemotherapy. 6. Chronic hypoxia, on home oxygen via nasal cannula.    7. Chronic heart failure with ejection fraction of 40%.    PLAN: Admit the patient to [facility] on the hospitalist service. IV fluids,   gentle hydration, monitor kidney function with hydration. Hold   furosemide for now. IV antibiotics including Levaquin, nebulizer   treatment, prednisone, oxygen supplementation to maintain O2 saturation   above 92%. Monitor vital signs, including blood pressure as per unit   protocol. Restart home medications except furosemide. DVT   prophylaxis, Lovenox. GI prophylaxis, PPI. Fall precautions, skin care   precaution. Out of bed to chair with assistance. I discussed advanced   directive with the patient. The spouse is the next of kin. Code is FULL   CODE. Further management will depend on the patient's clinical   progression, further evaluation by attending physician and results of   tests. Consider pulmonary consult if the patient continues to be short of   breath with current treatment.         MD MALGORZATA Patel / SUKHI   D:  08/02/2017   20:58   T:  08/02/2017   21:54   Job #:  [id number]

## 2017-08-03 NOTE — CDMP QUERY
There is noted documentation of \"Chronic hypoxia\" on this record. Could this be further clarified as    =>Chronic Respiratory Failure in the setting of COPD/Lung CA requiring home oxygen 2lts/min  =>Other Explanation of clinical findings  =>Unable to Determine (no explanation of clinical findings)    The medical record reflects the following clinical findings, treatment, and risk factors:    Risk Factors: COPD/Lung CA  Clinical Indicators: home oxygen 2lts/min  Treatment: supplemental oxygen      Please clarify and document your clinical opinion in the progress notes and discharge summary including the definitive and/or presumptive diagnosis, (suspected or probable), related to the above clinical findings.  Please include clinical findings supporting your diagnosis    Thank you,         Alberta Buitrago [address] [place] [address]

## 2017-08-03 NOTE — ROUTINE PROCESS
TRANSFER - OUT REPORT:    Verbal report given to Deonte Stone RN (name) on Tod Lean  being transferred to [facility](unit) for routine progression of care       Report consisted of patients Situation, Background, Assessment and   Recommendations(SBAR). Information from the following report(s) SBAR, ED Summary, [facility] and Recent Results was reviewed with the receiving nurse. Lines:   Peripheral IV 08/02/17 Right Antecubital (Active)   Site Assessment Clean, dry, & intact 8/2/2017  4:37 PM   Phlebitis Assessment 0 8/2/2017  4:37 PM   Infiltration Assessment 0 8/2/2017  4:37 PM   Dressing Status Clean, dry, & intact 8/2/2017  4:37 PM   Dressing Type Transparent 8/2/2017  4:37 PM   Hub Color/Line Status Pink;Flushed;Patent 8/2/2017  4:37 PM   Action Taken Blood drawn 8/2/2017  4:37 PM        Opportunity for questions and clarification was provided.       Patient transported with:   Monitor  O2 @ 2 liters  Registered Nurse

## 2017-08-03 NOTE — PROGRESS NOTES
Day #1 of Levaquin  Indication:  acute bronchitis  Current regimen:  750 mg q24h    Recent Labs      17   1635   WBC  14.5*   CREA  1.00   BUN  20     Est CrCl: 48 ml/min   Temp (24hrs), Av.3 °F (36.8 °C), Min:98 °F (36.7 °C), Max:98.8 °F (37.1 °C)    Plan: Change to 750 mg q48h per protocol

## 2017-08-03 NOTE — PROGRESS NOTES
Day #2 of Levaquin  Indication:  Acute bronchitis / COPD exacerbation  Current regimen:  750 mg IV Q 48 hr  Abx regimen: Monotherapy  Recent Labs      17   0416  17   1635   WBC  7.2  14.5*   CREA  0.90  1.00   BUN  14  20     Est CrCl: ~50-60 ml/min  Temp (24hrs), Av.4 °F (36.9 °C), Min:98 °F (36.7 °C), Max:98.8 °F (37.1 °C)    Cultures:    Blood: NGTD    Plan: Change to 750 mg IV Q 24 hr per [facility] P[facility]T Committee Protocol with respect to renal function. Pharmacy will continue to monitor patient daily and will make dosage adjustments based upon changing renal function.

## 2017-08-03 NOTE — PROGRESS NOTES
0730: Bedside and Verbal shift change report given to Janeen Aase, RN (oncoming nurse) by Nelli Hernandez RN (offgoing nurse). Report included the following information SBAR, Kardex, Intake/Output, MAR, Accordion and Recent Results. 0800: Assessment completed. Patient requesting home medication for nausea (noted 8mg zofran in PTA list) - patient requested pill instead of already ordered PRN IV dose of 4mg. Hospitalist paged for new order. 5401: Second page for hospitalist re: Scarlet Orellana. 0911: Patient given PRN zofran at request.     1200: No changes to previous assessment. Patient resting quietly in bed.     1555: TRANSFER - OUT REPORT:    Verbal report given to Cadence Galeas RN(name) on Glenn Vivar  being transferred to [facility]) for routine progression of care       Report consisted of patients Situation, Background, Assessment and   Recommendations(SBAR). Information from the following report(s) SBAR, Kardex, Intake/Output, MAR, Accordion, Recent Results and Cardiac Rhythm Sinus tach was reviewed with the receiving nurse. Lines:   Peripheral IV 08/02/17 Right Antecubital (Active)   Site Assessment Clean, dry, & intact 8/3/2017 12:00 PM   Phlebitis Assessment 0 8/3/2017 12:00 PM   Infiltration Assessment 0 8/3/2017 12:00 PM   Dressing Status Clean, dry, & intact 8/3/2017 12:00 PM   Dressing Type Transparent;Tape 8/3/2017 12:00 PM   Hub Color/Line Status Pink; Infusing 8/3/2017 12:00 PM   Action Taken Open ports on tubing capped 8/3/2017 12:00 PM   Alcohol Cap Used Yes 8/3/2017 12:00 PM        Opportunity for questions and clarification was provided.       Patient transported with:   Monitor  O2 @ 2 liters

## 2017-08-03 NOTE — ED NOTES
Pt ambulatory to the bedside commode with slow and steady gait. No s/s of acute distress at this moment. Will continue to monitor.

## 2017-08-03 NOTE — ED NOTES
Report received from [address] [facility] (offgoing nurse). Report included MAR, SBAR, and recent results.

## 2017-08-03 NOTE — PROGRESS NOTES
TRANSFER - IN REPORT:    Verbal report received from CHARISSE Lynne(name) on Jarod Vinson  being received from ED(unit) for routine progression of care      Report consisted of patients Situation, Background, Assessment and   Recommendations(SBAR). Information from the following report(s) SBAR, Kardex, ED Summary, Procedure Summary, Intake/Output, MAR, Accordion, Recent Results, Cardiac Rhythm ST and Alarm Parameters  was reviewed with the receiving nurse. Opportunity for questions and clarification was provided. Assessment completed upon patients arrival to unit and care assumed. Problem: Falls - Risk of  Goal: *Absence of falls  Outcome: Progressing Towards Goal  Patient on bed rest with proper safety awareness.

## 2017-08-03 NOTE — PROGRESS NOTES
Problem: Discharge Planning  Goal: *Discharge to safe environment  Outcome: Progressing Towards Goal  Home with family assist.

## 2017-08-03 NOTE — PROGRESS NOTES
Admission Medication Reconciliation:    Information obtained from: Patient, patient's medication bottles    Significant PMH/Disease States:   Past Medical History:   Diagnosis Date    Cancer ([facility]     Lung CA    [facility]     Emphysema     Heart failure ([facility]        Chief Complaint for this Admission:  Shortness of breath    Allergies:  Review of patient's allergies indicates no known allergies. Prior to Admission Medications:   Prior to Admission Medications   Prescriptions Last Dose Informant Patient Reported? Taking? albuterol (PROVENTIL HFA, VENTOLIN HFA, PROAIR HFA) 90 mcg/actuation inhaler   Yes Yes   Sig: Take 2 Puffs by inhalation every six (6) hours as needed for Wheezing. albuterol (PROVENTIL VENTOLIN) 2.5 mg /3 mL (0.083 %) nebulizer solution   Yes Yes   Si.5 mg by Nebulization route every four (4) hours as needed for Wheezing. aspirin 81 mg chewable tablet   No Yes   Sig: Take 1 Tab by mouth daily. azithromycin (ZITHROMAX) 250 mg tablet   Yes Yes   Sig: Take 250 mg by mouth daily. budesonide-formoterol (SYMBICORT) 160-4.5 mcg/actuation HFA inhaler   Yes Yes   Sig: Take 2 Puffs by inhalation two (2) times a day. dexamethasone (DECADRON) 4 mg tablet   Yes Yes   Sig: Take 8 mg by mouth daily. Takes 8mg once daily the night before chemo, day 2 of chemo, and day 3 of chemo. furosemide (LASIX) 20 mg tablet   No Yes   Sig: Take 1 Tab by mouth daily. lisinopril (PRINIVIL, ZESTRIL) 20 mg tablet   Yes Yes   Sig: Take 20 mg by mouth daily. metoprolol succinate (TOPROL-XL) 50 mg XL tablet   No Yes   Sig: Take 1 Tab by mouth daily. ondansetron hcl (ZOFRAN) 8 mg tablet   Yes Yes   Sig: Take 8 mg by mouth every eight (8) hours as needed for Nausea. prochlorperazine (COMPAZINE) 10 mg tablet   Yes Yes   Sig: Take 10 mg by mouth every six (6) hours as needed for Nausea. spironolactone (ALDACTONE) 25 mg tablet   No Yes   Sig: Take 1 Tab by mouth daily. tiotropium (SPIRIVA WITH HANDIHALER) 18 mcg inhalation capsule   Yes Yes   Sig: Take 1 Cap by inhalation every evening. traMADol (ULTRAM) 50 mg tablet   Yes Yes   Sig: Take 50 mg by mouth every six (6) hours as needed for Pain. Facility-Administered Medications: None         Comments/Recommendations: This medication history was obtained from the patient's medication bottles and the patient; she appears to be a good historian. Medications added: ondansetron, prochlorperazine maleate, dexamethasone  Medications deleted: calcium-vitamin D, guaifenesin ER, prednisone    Thank you for allowing me to participate in the care of this patient. Please contact the pharmacy () or the medication reconciliation pharmacy () with any questions.       Eliane Kelley, Pharmacy Intern

## 2017-08-04 NOTE — PROGRESS NOTES
Problem: Falls - Risk of  Goal: *Knowledge of fall prevention  Outcome: Progressing Towards Goal  Instructed patient to call when when needing assistance. Patient demonstrates understanding. 1497- Patient requested to get a break from the IV fluids. IV fluids placed on standby. Patient ambulated to the bathroom and upon her return to bed patient experienced an episode of epistaxis, humidity added to her oxygen. will continue to monitor. 3540- Bedside and Verbal shift change report given to [address] [place] [address] (oncoming nurse) by Roel Smith (offgoing nurse). Report included the following information SBAR, Kardex, Intake/Output, MAR, Recent Results, Cardiac Rhythm Sinus Tachycardia and Alarm Parameters .

## 2017-08-04 NOTE — ROUTINE PROCESS
Bedside shift change report given to Mariana Wayne RN (oncoming nurse) by Rolando Klein RN (offgoing nurse). Report included the following information SBAR, Kardex, Procedure Summary, Intake/Output, Accordion and Cardiac Rhythm NSR.

## 2017-08-04 NOTE — PROGRESS NOTES
I have reviewed discharge instructions with the patient. The patient verbalized understanding. Opportunities for questions provided, PIV removed.

## 2017-08-04 NOTE — PROGRESS NOTES
Hospitalist Progress Note  Radha Reyes MD  Office: [phone]  Cell: [phone]      Date of Service:  8/3/2017  NAME:  Jelena Dias  :  1950  MRN:  [id number]      Admission Summary:   61-year-old female with history of recurrent lung cancer, who presented to the emergency room with complaints of shortness of breath that started today. The patient also has history of COPD, emphysema and   hypoxia, for which she uses her home oxygen at 2 liters via nasal cannula. The patient was accompanied by her  and daughter. There is associated cough that is productive of whitish phlegm and   cough has been ongoing since Monday following more chemotherapy. Interval history / Subjective:     Feels \"much\" better     Assessment & Plan:     1. Shortness of breath. Improved. Will monitor further overnight and consider d/c in AM  2. Acute bronchitis. As above. Levaquin on board  3. Chronic obstructive pulmonary disease exacerbation. As above. 4. Dehydration. 5. Recurrent lung cancer, currently on chemotherapy. 6. Chronic hypoxia, on home oxygen via nasal cannula. 7. Chronic heart failure with ejection fraction of 40%. Code status: Full  DVT prophylaxis: Lovenox    Care Plan discussed with: Patient/Family and Nurse  Disposition: TBD     Hospital Problems  Date Reviewed: 2017          Codes Class Noted POA    * (Principal)Acute bronchitis ICD-10-CM: J20.9  ICD-9-CM: 466.0  2017 Unknown        Dehydration ICD-10-CM: E86.0  ICD-9-CM: 276.51  2017 Unknown        SOB (shortness of breath) ICD-10-CM: R06.02  ICD-9-CM: 786.05  2017 Unknown                Review of Systems:   A comprehensive review of systems was negative. Vital Signs:    Last 24hrs VS reviewed since prior progress note.  Most recent are:  Visit Vitals    /57 (BP 1 Location: Left arm, BP Patient Position: At rest)    Pulse 94    Temp 98.3 °F (36.8 °C)  Resp 27    Ht 5' 6\" (1.676 m)    Wt 53.5 kg (117 lb 15.1 oz)    SpO2 100%    BMI 19.04 kg/m2         Intake/Output Summary (Last 24 hours) at 08/03/17 2302  Last data filed at 08/03/17 1200   Gross per 24 hour   Intake           543.33 ml   Output              450 ml   Net            93.33 ml        Physical Examination:             Constitutional:  No acute distress, cooperative, pleasant    ENT:  Oral mucous moist, oropharynx benign. Neck supple,    Resp:  CTA bilaterally. No wheezing/rhonchi/rales. No accessory muscle use   CV:  Regular rhythm, normal rate, no murmurs, gallops, rubs    GI:  Soft, non distended, non tender. normoactive bowel sounds, no hepatosplenomegaly     Musculoskeletal:  No edema, warm, 2+ pulses throughout    Neurologic:  Moves all extremities. AAOx3, CN II-XII reviewed            Data Review:    Review and/or order of clinical lab test      Labs:     Recent Labs      08/03/17   0416  08/02/17   1635   WBC  7.2  14.5*   HGB  9.7*  10.4*   HCT  29.4*  31.2*   PLT  102*  120*     Recent Labs      08/03/17   0416  08/02/17   1635   NA  139  135*   K  3.9  4.0   CL  105  101   CO2  26  26   BUN  14  20   CREA  0.90  1.00   GLU  92  108*   CA  8.6  8.9     Recent Labs      08/02/17   1635   SGOT  16   ALT  20   AP  84   TBILI  0.7   TP  6.6   ALB  3.2*   GLOB  3.4     No results for input(s): INR, PTP, APTT in the last 72 hours. No lab exists for component: INREXT   No results for input(s): FE, TIBC, PSAT, FERR in the last 72 hours. No results found for: FOL, RBCF   No results for input(s): PH, PCO2, PO2 in the last 72 hours. No results for input(s): CPK, CKNDX, TROIQ in the last 72 hours.     No lab exists for component: CPKMB  Lab Results   Component Value Date/Time    Cholesterol, total 178 10/27/2016 03:00 AM    HDL Cholesterol 61 10/27/2016 03:00 AM    LDL, calculated 99.8 10/27/2016 03:00 AM    Triglyceride 86 10/27/2016 03:00 AM    CHOL/HDL Ratio 2.9 10/27/2016 03:00 AM Lab Results   Component Value Date/Time    Glucose (POC) 109 12/30/2016 07:29 AM    Glucose (POC) 135 12/29/2016 10:04 PM    Glucose (POC) 129 12/29/2016 05:04 PM    Glucose (POC) 122 12/29/2016 11:48 AM    Glucose (POC) 195 12/29/2016 06:41 AM     Lab Results   Component Value Date/Time    Color YELLOW/STRAW 08/03/2017 05:59 AM    Appearance CLEAR 08/03/2017 05:59 AM    Specific gravity 1.014 08/03/2017 05:59 AM    pH (UA) 6.0 08/03/2017 05:59 AM    Protein NEGATIVE  08/03/2017 05:59 AM    Glucose NEGATIVE  08/03/2017 05:59 AM    Ketone NEGATIVE  08/03/2017 05:59 AM    Bilirubin NEGATIVE  08/03/2017 05:59 AM    Urobilinogen 0.2 08/03/2017 05:59 AM    Nitrites NEGATIVE  08/03/2017 05:59 AM    Leukocyte Esterase NEGATIVE  08/03/2017 05:59 AM    Epithelial cells FEW 08/03/2017 05:59 AM    Bacteria NEGATIVE  08/03/2017 05:59 AM    WBC 0-4 08/03/2017 05:59 AM    RBC 0-5 08/03/2017 05:59 AM         Medications Reviewed:     Current Facility-Administered Medications   Medication Dose Route Frequency    ondansetron (ZOFRAN ODT) tablet 8 mg  8 mg Oral Q8H PRN    levoFLOXacin (LEVAQUIN) 750 mg in D5W IVPB  750 mg IntraVENous Q24H    albuterol (PROVENTIL VENTOLIN) nebulizer solution 2.5 mg  2.5 mg Nebulization Q4H PRN    aspirin chewable tablet 81 mg  81 mg Oral DAILY    guaiFENesin ER (MUCINEX) tablet 600 mg  600 mg Oral BID PRN    lisinopril (PRINIVIL, ZESTRIL) tablet 20 mg  20 mg Oral DAILY    metoprolol succinate (TOPROL-XL) XL tablet 50 mg  50 mg Oral DAILY    spironolactone (ALDACTONE) tablet 25 mg  25 mg Oral DAILY    traMADol (ULTRAM) tablet 50 mg  50 mg Oral DAILY PRN    sodium chloride (NS) flush 5-10 mL  5-10 mL IntraVENous Q8H    sodium chloride (NS) flush 5-10 mL  5-10 mL IntraVENous PRN    acetaminophen (TYLENOL) tablet 650 mg  650 mg Oral Q4H PRN    enoxaparin (LOVENOX) injection 40 mg  40 mg SubCUTAneous Q24H    0.9% sodium chloride infusion  100 mL/hr IntraVENous CONTINUOUS    predniSONE (DELTASONE) tablet 40 mg  40 mg Oral DAILY WITH BREAKFAST    albuterol-ipratropium (DUO-NEB) 2.5 MG-0.5 MG/3 ML  3 mL Nebulization Q6H RT    arformoterol (BROVANA) neb solution 15 mcg  15 mcg Nebulization BID RT    And    budesonide (PULMICORT) 500 mcg/2 ml nebulizer suspension  500 mcg Nebulization BID RT     ______________________________________________________________________  EXPECTED LENGTH OF STAY: 3d 7h  ACTUAL LENGTH OF STAY:          1                 Barbara Solano MD

## 2017-08-10 NOTE — PROGRESS NOTES
[facility] ICD 6 second  bpm 8/5/2017  She has nonobstructive CAD  Continue to monitor and will discuss treatment if recurrent  Future Appointments  Date Time Provider Jesus Alberto Alia   9/12/2017 10:15 AM REMOTE1, [address]   10/3/2017 1:00 PM Eb Martinez MD [facility]   12/19/2017 10:00 AM REMOTE1, [address]   3/19/2018 1:45 PM [address] [place] [address], [address]   3/19/2018 2:00 PM MD Mejia Jenkins

## 2017-08-25 NOTE — PROGRESS NOTES
Admission Medication Reconciliation:    Information obtained from: patient, chart review    Significant PMH/Disease States:   Past Medical History:   Diagnosis Date    Cancer ([facility]     Lung CA    [facility] pulmonary disease ([facility]     Emphysema     Heart failure ([facility]        Chief Complaint for this Admission:    Chief Complaint   Patient presents with    Shortness of Breath       Allergies:  Review of patient's allergies indicates no known allergies. Prior to Admission Medications:   Prior to Admission Medications   Prescriptions Last Dose Informant Patient Reported? Taking? albuterol (PROVENTIL HFA, VENTOLIN HFA, PROAIR HFA) 90 mcg/actuation inhaler   Yes Yes   Sig: Take 2 Puffs by inhalation every six (6) hours as needed for Wheezing. albuterol (PROVENTIL VENTOLIN) 2.5 mg /3 mL (0.083 %) nebulizer solution   Yes Yes   Si.5 mg by Nebulization route every four (4) hours as needed for Wheezing. aspirin 81 mg chewable tablet   No Yes   Sig: Take 1 Tab by mouth daily. azithromycin (ZITHROMAX) 250 mg tablet   Yes Yes   Sig: Take 250 mg by mouth daily. budesonide-formoterol (SYMBICORT) 160-4.5 mcg/actuation HFA inhaler   Yes Yes   Sig: Take 2 Puffs by inhalation two (2) times a day. dexamethasone (DECADRON) 4 mg tablet   Yes Yes   Sig: Take 8 mg by mouth daily. Takes 8mg once daily the night before chemo, day 2 of chemo, and day 3 of chemo. furosemide (LASIX) 20 mg tablet   No Yes   Sig: Take 1 Tab by mouth daily. lisinopril (PRINIVIL, ZESTRIL) 20 mg tablet   Yes Yes   Sig: Take 20 mg by mouth daily. metoprolol succinate (TOPROL-XL) 50 mg XL tablet   No Yes   Sig: Take 1 Tab by mouth daily. ondansetron hcl (ZOFRAN) 8 mg tablet   Yes Yes   Sig: Take 8 mg by mouth every eight (8) hours as needed for Nausea. prochlorperazine (COMPAZINE) 10 mg tablet   Yes Yes   Sig: Take 10 mg by mouth every six (6) hours as needed for Nausea.    spironolactone (ALDACTONE) 25 mg tablet   No Yes Sig: Take 1 Tab by mouth daily. tiotropium (SPIRIVA WITH HANDIHALER) 18 mcg inhalation capsule   Yes Yes   Sig: Take 1 Cap by inhalation every evening. traMADol (ULTRAM) 50 mg tablet   Yes Yes   Sig: Take 50 mg by mouth every six (6) hours as needed for Pain. Facility-Administered Medications: None         Comments/Recommendations:   Patient was a poor historian. She stated she has been on the same medications that she was discharged with from [facility] on 08/04 as listed above. Added no medications. Removed no medications. Changed no medications.

## 2017-08-25 NOTE — ED PROVIDER NOTES
HPI Comments: 61 y.o. female with past medical history significant for Lung cancer, COPD, CHF who presents via EMS with chief complaint of SOB. Patient undergoing chemotherapy for lung cancer, this is her 3rd tx she says, last tx was 5 days ago. Since chemo she has felt very weak, nauseated; typically feels this way after a tx. Her SOB worsened today, her chest feels tight. She received zofran in route by EMS and started on a liter of fluids for low BP and nausea. Plans to start radiation in the near future. Oncologist, Dr. Ismael Menon. Does c/o some cough w/ white sputum. She denies chest pain, abd pain, fevers. No other medical complaints voiced at this time. There are no other acute medical concerns at this time. Social hx: Former smoker. No alcohol use. No illicit drug use. PCP: Cassie Morris MD    The history is provided by the patient and the EMS personnel. Past Medical History:   Diagnosis Date    Cancer [facility])     Lung CA    Chronic obstructive pulmonary disease (HCC)     Emphysema     Heart failure (HCC)        Past Surgical History:   Procedure Laterality Date    CHEST SURGERY PROCEDURE UNLISTED Bilateral 2007/2009    lung lobe resection    HX CHOLECYSTECTOMY      HX GYN      GEETA    HX ORTHOPAEDIC      shoulder    HX OTHER SURGICAL      pacemaker/defib    INS PPM/ICD LED SING ONLY  11/4/2016              History reviewed. No pertinent family history. Social History     Social History    Marital status:      Spouse name: N/A    Number of children: N/A    Years of education: N/A     Occupational History    Not on file.      Social History Main Topics    Smoking status: Former Smoker     Packs/day: 1.00     Years: 40.00     Quit date: 2/6/2017    Smokeless tobacco: Current User     Last attempt to quit: 10/26/2016    Alcohol use No    Drug use: No    Sexual activity: Not on file     Other Topics Concern    Not on file     Social History Narrative         ALLERGIES: Review of patient's allergies indicates no known allergies. Review of Systems   Constitutional: Negative for activity change, appetite change, chills and fever. HENT: Negative for congestion, rhinorrhea, sinus pressure, sneezing and sore throat. Eyes: Negative for pain, discharge and visual disturbance. Respiratory: Positive for cough and shortness of breath. Cardiovascular: Negative for chest pain. Gastrointestinal: Positive for nausea. Negative for abdominal pain, diarrhea and vomiting. Genitourinary: Negative for dysuria, flank pain, frequency and urgency. Musculoskeletal: Negative for arthralgias, back pain, gait problem, joint swelling, myalgias and neck pain. Skin: Negative for color change and rash. Neurological: Positive for weakness (generalized). Negative for dizziness, speech difficulty, light-headedness, numbness and headaches. Psychiatric/Behavioral: Negative for agitation, behavioral problems and confusion. All other systems reviewed and are negative. Vitals:    08/25/17 1457 08/25/17 1500 08/25/17 1533 08/25/17 1545   BP: 110/59 111/62 105/71 105/71   Pulse:  (!) 104 (!) 104 93   Resp:  17 21 24   Temp:       SpO2:  100% 97% 98%   Height:                Physical Exam   Constitutional: She is oriented to person, place, and time. She appears well-developed and well-nourished. No distress. Chronically ill-appearing  Nasal cannula in place, at baseline 2L O2 w/ normal sats   HENT:   Head: Normocephalic and atraumatic. Right Ear: External ear normal.   Left Ear: External ear normal.   Nose: Nose normal.   Mouth/Throat: Oropharynx is clear and moist. No oropharyngeal exudate. Eyes: Conjunctivae and EOM are normal. Pupils are equal, round, and reactive to light. Neck: Normal range of motion. Neck supple. Cardiovascular: Normal rate, regular rhythm, normal heart sounds and intact distal pulses. Pulmonary/Chest: Effort normal. No respiratory distress. She has no wheezes. She has no rales. Breath sounds diminished throughout   Abdominal: Soft. Bowel sounds are normal. There is no tenderness. There is no rebound and no guarding. Musculoskeletal: Normal range of motion. Neurological: She is alert and oriented to person, place, and time. Skin: Skin is warm and dry. Psychiatric: She has a normal mood and affect. Her behavior is normal. Judgment and thought content normal.   Nursing note and vitals reviewed. MDM  Number of Diagnoses or Management Options  Chronic obstructive pulmonary disease with acute exacerbation [facility]):   Malignant neoplasm of lung, unspecified laterality, unspecified part of lung [facility]):   Diagnosis management comments: 62 yo female with a hx of lung cancer presents for cough and SOB. Hx COPD. Will give neb, steroids, obtain labs, CXR. Generalized weakness the patient admits is typical for her, saurav the days following chemo. She is getting tx every 3 weeks. Last treatment was 5 days ago and she will begin radiation in the near future. CTA obtained to r/o acute process. Subtle changes to lung masses, otherwise, nothing acute found today after thorough workup. At this time, no indication for admission to the hospital. Chronic illnesses. She feels better at discharge and is comfortable going home today. ED Course       Procedures    1:27 PM  Patient feels better after neb and steroids. 4:02 PM  Patient is feeling and looking much better than on arrival. She is also feeling better. BP improved to 105/70. Will ambulate and obtain new set of vitals prior to dc. Patient and family in the room. Dr. Aneta Laboy has also seen and evaluated this patient. Successfully ambulated around the ED, steady gait observed, NAD. BP improved after 2L fluids, lungs sound much better, decent air movement throughout. Patient looking better now than on arrival. Resting comfortably. She reports feeling better. She is OK with going home today.

## 2017-08-25 NOTE — ED TRIAGE NOTES
Pt arrives from home c/o increasing sob, bp in the 70's per squad, pt tachypneic , +cough- white sputum, denies fever , pt had chemo on Monday, pt c/o generalized weakness

## 2017-08-25 NOTE — ED NOTES
Bedside and Verbal shift change report given to Sada Duarte RN (oncoming nurse) by Ivana Mathis RN (offgoing nurse). Report included the following information ED Summary.

## 2017-08-25 NOTE — DISCHARGE INSTRUCTIONS
Chronic Obstructive Pulmonary Disease (COPD): Care Instructions  Your Care Instructions    Chronic obstructive pulmonary disease (COPD) is a general term for a group of lung diseases, including emphysema and chronic bronchitis. People with COPD have decreased airflow in and out of the lungs, which makes it hard to breathe. The airways also can get clogged with thick mucus. Cigarette smoking is a major cause of COPD. Although there is no cure for COPD, you can slow its progress. Following your treatment plan and taking care of yourself can help you feel better and live longer. Follow-up care is a key part of your treatment and safety. Be sure to make and go to all appointments, and call your doctor if you are having problems. It's also a good idea to know your test results and keep a list of the medicines you take. How can you care for yourself at home? Staying healthy  · Do not smoke. This is the most important step you can take to prevent more damage to your lungs. If you need help quitting, talk to your doctor about stop-smoking programs and medicines. These can increase your chances of quitting for good. · Avoid colds and flu. Get a pneumococcal vaccine shot. If you have had one before, ask your doctor whether you need a second dose. Get the flu vaccine every fall. If you must be around people with colds or the flu, wash your hands often. · Avoid secondhand smoke, air pollution, and high altitudes. Also avoid cold, dry air and hot, humid air. Stay at home with your windows closed when air pollution is bad. Medicines and oxygen therapy  · Take your medicines exactly as prescribed. Call your doctor if you think you are having a problem with your medicine. · You may be taking medicines such as:  ¨ Bronchodilators. These help open your airways and make breathing easier. Bronchodilators are either short-acting (work for 6 to 9 hours) or long-acting (work for 24 hours).  You inhale most bronchodilators, so they start to act quickly. Always carry your quick-relief inhaler with you in case you need it while you are away from home. ¨ Corticosteroids (prednisone, budesonide). These reduce airway inflammation. They come in pill or inhaled form. You must take these medicines every day for them to work well. · A spacer may help you get more inhaled medicine to your lungs. Ask your doctor or pharmacist if a spacer is right for you. If it is, ask how to use it properly. · Do not take any vitamins, over-the-counter medicine, or herbal products without talking to your doctor first.  · If your doctor prescribed antibiotics, take them as directed. Do not stop taking them just because you feel better. You need to take the full course of antibiotics. · Oxygen therapy boosts the amount of oxygen in your blood and helps you breathe easier. Use the flow rate your doctor has recommended, and do not change it without talking to your doctor first.  Activity  · Get regular exercise. Walking is an easy way to get exercise. Start out slowly, and walk a little more each day. · Pay attention to your breathing. You are exercising too hard if you cannot talk while you are exercising. · Take short rest breaks when doing household chores and other activities. · Learn breathing methods--such as breathing through pursed lips--to help you become less short of breath. · If your doctor has not set you up with a pulmonary rehabilitation program, talk to him or her about whether rehab is right for you. Rehab includes exercise programs, education about your disease and how to manage it, help with diet and other changes, and emotional support. Diet  · Eat regular, healthy meals. Use bronchodilators about 1 hour before you eat to make it easier to eat. Eat several small meals instead of three large ones. Drink beverages at the end of the meal. Avoid foods that are hard to chew.   · Eat foods that contain protein so that you do not lose muscle mass.  · Talk with your doctor if you gain too much weight or if you lose weight without trying. Mental health  · Talk to your family, friends, or a therapist about your feelings. It is normal to feel frightened, angry, hopeless, helpless, and even guilty. Talking openly about bad feelings can help you cope. If these feelings last, talk to your doctor. When should you call for help? Call 911 anytime you think you may need emergency care. For example, call if:  · You have severe trouble breathing. Call your doctor now or seek immediate medical care if:  · You have new or worse trouble breathing. · You cough up blood. · You have a fever. Watch closely for changes in your health, and be sure to contact your doctor if:  · You cough more deeply or more often, especially if you notice more mucus or a change in the color of your mucus. · You have new or worse swelling in your legs or belly. · You are not getting better as expected. Where can you learn more? Go to http://otto-linette.info/. Reynaldo Slater in the search box to learn more about \"Chronic Obstructive Pulmonary Disease (COPD): Care Instructions. \"  Current as of: March 25, 2017  Content Version: 11.3  © 3824-5702 [employer]. Care instructions adapted under license by [employer] (which disclaims liability or warranty for this information). If you have questions about a medical condition or this instruction, always ask your healthcare professional. [place] [address] any warranty or liability for your use of this information.

## 2017-08-25 NOTE — ED NOTES
I personally saw and examined the patient. I have reviewed and agree with the MLP's findings, including all diagnostic interpretations, and plans as written. I was present during the key portions of separately billed procedures.     Sarina Quintanilla MD

## 2017-10-02 NOTE — TELEPHONE ENCOUNTER
Telephone Call RE:  Appointment reminder     Outcome:     [] Patient confirmed appointment   [] Patient rescheduled appointment for    [] Unable to reach   [x] Left message              [] Other:       Georges Magaña

## 2017-10-03 NOTE — MR AVS SNAPSHOT
Visit Information Date & Time Provider Department Dept. Phone Encounter #  
 10/3/2017  1:00 PM Enrique Wiley MD [address] [id number] Your Appointments 3/19/2018  1:45 PM  
PACEMAKER with [facility] (LANCE SCHEDULING) Appt Note: sjm icd, rc, annual thresh/M, see [address] [place] [address]  
[address] [facility]  
  
    
 3/19/2018  2:00 PM  
ESTABLISHED PATIENT with Nadia Baker MD  
[facility] ([address]) Appt Note: sjm icd, rc, annual thresh/M, see [address] [place] [address]  
[address] [id number]  
  
    
  
 12/19/2017 10:00 AM  
REMOTE OFFICE VISIT with Clint Thibodeaux [facility] (LANCE SCHEDULING) Appt Note: sjm icd, rc  
 [address] [facility] [address] [place] [address]  
[address] [facility] [address] [address] Upcoming Health Maintenance Date Due Hepatitis C Screening 1950 DTaP/Tdap/Td series (1 - Tdap) 10/20/1973 PAP AKA CERVICAL CYTOLOGY 10/20/1973 BREAST CANCER SCRN MAMMOGRAM 10/20/2002 FOBT Q 1 YEAR AGE 50-75 10/20/2002 Pneumococcal 19-64 Highest Risk (2 of 3 - PCV13) 11/21/2011 ZOSTER VACCINE AGE 60> 8/20/2012 INFLUENZA AGE 9 TO ADULT 8/1/2017 Allergies as of 10/3/2017  Review Complete On: 10/3/2017 By: Jona Carreon RN No Known Allergies Current Immunizations  Reviewed on 12/30/2016 Name Date Influenza Vaccine 10/15/2014 Influenza Vaccine [place]) 10/10/2016 Pneumococcal Vaccine (Unspecified Type) 11/21/2010 Not reviewed this visit Vitals BP Pulse Temp Resp Height(growth percentile) Weight(growth percentile) (!) 80/58 (BP 1 Location: Left arm, BP Patient Position: Sitting) 84 98.5 °F (36.9 °C) 16 5' 5\" (1.651 m) 115 lb (52.2 kg) BMI OB Status Smoking Status 19.14 kg/m2 Hysterectomy Former Smoker Vitals History BMI and BSA Data Body Mass Index Body Surface Area  
 19.14 kg/m 2 1.55 m 2 Your Updated Medication List  
  
   
This list is accurate as of: 10/3/17  2:15 PM.  Always use your most recent med list.  
  
  
  
  
 0.9% sodium chloride inhalation Take 5 mL by inhalation two (2) times a day. * albuterol 2.5 mg /3 mL (0.083 %) nebulizer solution Commonly known as:  PROVENTIL VENTOLIN  
2.5 mg by Nebulization route every four (4) hours as needed for Wheezing. * albuterol 90 mcg/actuation inhaler Commonly known as:  PROVENTIL HFA, VENTOLIN HFA, PROAIR HFA Take 2 Puffs by inhalation every six (6) hours as needed for Wheezing. aspirin 81 mg chewable tablet Take 1 Tab by mouth daily. azithromycin 250 mg tablet Commonly known as:  Lashay Ruy Take 250 mg by mouth daily. furosemide 20 mg tablet Commonly known as:  LASIX Take 0.5 Tabs by mouth as needed. lisinopril 20 mg tablet Commonly known as:  Aundra Candy Take 20 mg by mouth daily. metoprolol succinate 50 mg XL tablet Commonly known as:  TOPROL-XL Take 1 Tab by mouth daily. ondansetron hcl 8 mg tablet Commonly known as:  Glorianne Ou Take 8 mg by mouth every eight (8) hours as needed for Nausea. prochlorperazine 10 mg tablet Commonly known as:  COMPAZINE Take 10 mg by mouth every six (6) hours as needed for Nausea. SPIRIVA WITH HANDIHALER 18 mcg inhalation capsule Generic drug:  tiotropium Take 1 Cap by inhalation every evening. spironolactone 25 mg tablet Commonly known as:  ALDACTONE Take 1 Tab by mouth daily. SYMBICORT 160-4.5 mcg/actuation Hfaa Generic drug:  budesonide-formoterol Take 2 Puffs by inhalation two (2) times a day. traMADol 50 mg tablet Commonly known as:  ULTRAM  
Take 50 mg by mouth every six (6) hours as needed for Pain. * Notice: This list has 2 medication(s) that are the same as other medications prescribed for you. Read the directions carefully, and ask your doctor or other care provider to review them with you. Patient Instructions Please call the office of Dr. Massiel Robles [phone] to establish care as a patient for primary care. Please decrease your furosemide (Lasix) to 1/2 tablet as needed for weight gain > 2 lbs in 1 day, swelling, or worsening shortness of breath. Continue your other medications as prescribed. We will order you an echocardiogram - a member of the scheduling department will reach out to schedule with you. Return for a follow up in 6 weeks. Introducing [facility] SERVICES! [facility] introduces [employer] patient portal. Now you can access parts of your medical record, email your doctor's office, and request medication refills online. 1. In your internet browser, go to [email or web address] 2. Click on the First Time User? Click Here link in the Sign In box. You will see the New Member Sign Up page. 3. Enter your [employer] Access Code exactly as it appears below. You will not need to use this code after youve completed the sign-up process. If you do not sign up before the expiration date, you must request a new code. · [employer] Access Code: [email or web address] Expires: 12/11/2017  1:50 PM 
 
4. Enter the last four digits of your Social Security Number (xxxx) and Date of Birth (mm/dd/yyyy) as indicated and click Submit. You will be taken to the next sign-up page. 5. Create a Finicity ID. This will be your [employer] login ID and cannot be changed, so think of one that is secure and easy to remember. 6. Create a [employer] password. You can change your password at any time. 7. Enter your Password Reset Question and Answer. This can be used at a later time if you forget your password. 8. Enter your e-mail address. You will receive e-mail notification when new information is available in [address] 9. Click Sign Up. You can now view and download portions of your medical record. 10. Click the Download Summary menu link to download a portable copy of your medical information. If you have questions, please visit the Frequently Asked Questions section of the [employer] website. Remember, [employer] is NOT to be used for urgent needs. For medical emergencies, dial 911. Now available from your iPhone and Android! Please provide this summary of care documentation to your next provider. Your primary care clinician is listed as Cirilo Bass. If you have any questions after today's visit, please call [phone].

## 2017-10-03 NOTE — PROGRESS NOTES
[facility] Progress Note    Ms. Marisa Schmidt is a 61year old woman with a history of a nonischemic cardiomyopathy and systolic heart failure. She also has significant COPD, and lung cancer with a prior left lobectomy. Ms. Marisa Schmidt was last seen in April 2017, at which time she denied significant complaint. She has had two admissions since that time - both in August, one for bronchitis and the second for dyspnea. Since then, she had progressive dyspnea and fatigue but attributes this mainly to recent chemo and radiation tx for her lung cancer. Mrs. Cheng Huertas BP is very low today, but she is asymptomatic. She states her BP fluctuates anywhere from 70s/50s to 120s/70s and is never dizzy or lightheaded, just feels \"tired\". She is compliant with her medications. Mrs. Marisa Schmidt does not watch her Na+ intake as her PO intake is markedly reduced d/t chemo tx and associated nausea, and she just eats what she can. Review of Systems   Constitutional: Positive for fatigue. Negative for chills, fever, and weight loss. Eyes: Negative for blurred vision. Respiratory: Positive for cough and shortness of breath. Cardiovascular: Negative for chest pain, palpitations, orthopnea, leg swelling and PND. Gastrointestinal: Negative for abdominal pain, constipation, diarrhea and nausea. Neurological: Negative for dizziness, tingling and headaches. Psychiatric/Behavioral: The patient does not have insomnia. Outpatient Encounter Prescriptions as of 10/3/2017   Medication Sig Dispense Refill    albuterol (PROVENTIL HFA, VENTOLIN HFA, PROAIR HFA) 90 mcg/actuation inhaler Take 2 Puffs by inhalation every six (6) hours as needed for Wheezing.  traMADol (ULTRAM) 50 mg tablet Take 50 mg by mouth every six (6) hours as needed for Pain.  ondansetron hcl (ZOFRAN) 8 mg tablet Take 8 mg by mouth every eight (8) hours as needed for Nausea.       prochlorperazine (COMPAZINE) 10 mg tablet Take 10 mg by mouth every six (6) hours as needed for Nausea.  dexamethasone (DECADRON) 4 mg tablet Take 8 mg by mouth daily. Takes 8mg once daily the night before chemo, day 2 of chemo, and day 3 of chemo.  azithromycin (ZITHROMAX) 250 mg tablet Take 250 mg by mouth daily.  metoprolol succinate (TOPROL-XL) 50 mg XL tablet Take 1 Tab by mouth daily. 30 Tab 3    furosemide (LASIX) 20 mg tablet Take 1 Tab by mouth daily. 30 Tab 0    spironolactone (ALDACTONE) 25 mg tablet Take 1 Tab by mouth daily. 30 Tab 0    aspirin 81 mg chewable tablet Take 1 Tab by mouth daily. 30 Tab 0    lisinopril (PRINIVIL, ZESTRIL) 20 mg tablet Take 20 mg by mouth daily.  albuterol (PROVENTIL VENTOLIN) 2.5 mg /3 mL (0.083 %) nebulizer solution 2.5 mg by Nebulization route every four (4) hours as needed for Wheezing.  budesonide-formoterol (SYMBICORT) 160-4.5 mcg/actuation HFA inhaler Take 2 Puffs by inhalation two (2) times a day.  tiotropium (SPIRIVA WITH HANDIHALER) 18 mcg inhalation capsule Take 1 Cap by inhalation every evening. No facility-administered encounter medications on file as of 10/3/2017. Patient Active Problem List   Diagnosis Code    Hypoxia R09.02    Multiple thyroid nodules E04.2    Tobacco use disorder Q51.304    Systolic heart failure ([facility]) I50.20    S/P ICD (internal cardiac defibrillator) procedure Z95.810    Heart failure, left, with LVEF <=30% ([facility]) I50.1    H/O: lung cancer Z85. 80    NICM (nonischemic cardiomyopathy) ([facility]) I42.8    Panlobular emphysema ([facility]) J43.1    Acute bronchitis J20.9    Dehydration E86.0    SOB (shortness of breath) R06.02       Objective  There were no vitals taken for this visit. Wt Readings from Last 3 Encounters:   08/04/17 116 lb 10 oz (52.9 kg)   04/19/17 120 lb (54.4 kg)   02/27/17 123 lb (55.8 kg)       Physical Exam   Constitutional: She is oriented to person, place, and time. She appears well-developed. No distress.    HENT:   Head: Normocephalic and atraumatic. Nose: Nose normal.   Eyes: EOM are normal. Pupils are equal, round, and reactive to light. Neck: Normal range of motion. Neck supple. No JVD present. Cardiovascular: Normal rate, regular rhythm, soft heart sounds and intact distal pulses. Pulmonary/Chest: Effort normal. No respiratory distress. She has no wheezes. She has no rales. Diminished   Abdominal: Soft. Bowel sounds are normal. She exhibits no distension. There is no tenderness. Musculoskeletal: No edema. Neurological: She is alert and oriented to person, place, and time. Skin: Skin is warm and dry. Psychiatric: She has a normal mood and affect.  Her behavior is normal.       Lab Results   Component Value Date/Time    Sodium 129 08/25/2017 12:39 PM    Sodium 130 08/25/2017 12:39 PM    Potassium 4.1 08/25/2017 12:39 PM    Potassium 4.2 08/25/2017 12:39 PM    Chloride 95 08/25/2017 12:39 PM    Chloride 96 08/25/2017 12:39 PM    CO2 27 08/25/2017 12:39 PM    CO2 26 08/25/2017 12:39 PM    Anion gap 7 08/25/2017 12:39 PM    Anion gap 8 08/25/2017 12:39 PM    Glucose 105 08/25/2017 12:39 PM    Glucose 104 08/25/2017 12:39 PM    BUN 23 08/25/2017 12:39 PM    BUN 24 08/25/2017 12:39 PM    Creatinine 1.13 08/25/2017 12:39 PM    Creatinine 1.14 08/25/2017 12:39 PM    BUN/Creatinine ratio 20 08/25/2017 12:39 PM    BUN/Creatinine ratio 21 08/25/2017 12:39 PM    GFR est AA 59 08/25/2017 12:39 PM    GFR est AA 58 08/25/2017 12:39 PM    GFR est non-AA 48 08/25/2017 12:39 PM    GFR est non-AA 48 08/25/2017 12:39 PM    Calcium 8.3 08/25/2017 12:39 PM    Calcium 8.4 08/25/2017 12:39 PM     Lab Results   Component Value Date/Time    WBC 13.9 08/25/2017 12:39 PM    HGB 9.1 08/25/2017 12:39 PM    HCT 27.0 08/25/2017 12:39 PM    PLATELET 131 98/28/3581 12:39 PM    MCV 94.4 08/25/2017 12:39 PM       Cath 11/3/2016  SUMMARY:  --  CARDIAC STRUCTURES:  --  Analysis of regional contractile function demonstrated moderate global hypokinesis. --  Global left ventricular function was moderately depressed. EF estimated was 30%. --  CORONARY CIRCULATION:  --  Proximal LAD: There was a tubular 30 % stenosis. The lesion was eccentric. --  Proximal circumflex: There was a tubular 40 % stenosis. --  Proximal RCA: There was a discrete 50 % stenosis. Stress MIBI 10/28/2016  IMPRESSION  Impression:   Fixed defect in the inferoapical wall concerning for small infarct. No reversible abnormality is seen to suggest myocardium at ischemic risk. Left ventricular dilatation. Left ventricular ejection fraction is 28%. Global Hypokinesia. Echo 10/27/2016  SUMMARY:  Left ventricle: Size was at the upper limits of normal. Systolic function was mildly to moderately reduced. Ejection fraction was estimated to be 40% in the range of 35% to 45 %. Suboptimal endocardial visualization limits wall motion analysis. There was moderate diffuse hypokinesis. Wall thickness was moderately increased. PFTs 10/31/2016  FVC  1.44 (42%)  FEV1  0.37 (14%0  FEV1/FVC 0.26 (33%)  [id number] 0.18 (8%)    ASSESSMENT and PLAN    NICM HFrEF 35-45% NYHA class II/III:  1. Continue lisinopril and metoprolol  2. Decrease furosemide to PRN. 3.  Repeat TTE to reassess EF. 4.  Daily weights, regular diet (as patient needs to eat whatever she can tolerate). 5.  Labs prior to next appointment in 1 month. Lung CA:   1. Likely contributing to dyspnea  2. Mgmt per oncologist     Hypotension, intermittent and asmpyomatic  1. Patient to monitor daily and report SBP < 90 mmHg  2. Hopeful for improvement off diuretic    High risk of SCD  1. S/p AICD, no shocks    Follow-up Disposition:  Return in about 6 weeks (around 11/14/2017).     DIMITRIS La [id number]  [address] [facility] [employer]

## 2017-10-03 NOTE — PATIENT INSTRUCTIONS
Please call the office of Dr. Pipo Arenas [phone] to establish care as a patient for primary care. Please decrease your furosemide (Lasix) to 1/2 tablet as needed for weight gain > 2 lbs in 1 day, swelling, or worsening shortness of breath. Continue your other medications as prescribed. We will order you an echocardiogram - a member of the scheduling department will reach out to schedule with you. Return for a follow up in 6 weeks.

## 2017-11-13 NOTE — TELEPHONE ENCOUNTER
Telephone Call RE:  Appointment reminder     Outcome:     [x] Patient confirmed appointment   [] Patient rescheduled appointment for    [] Unable to reach   [] Left message              [] Other:       Vielka Gamez

## 2017-11-14 NOTE — PROGRESS NOTES
[facility] Progress Note    Ms. Sanjeev Sanches is a 61year old woman with a history of a nonischemic cardiomyopathy and systolic heart failure. She also has significant COPD, and lung cancer with a prior left lobectomy. Ms. aSnjeev Sanches was last seen in April 2017, at which time she denied significant complaint. She has had two admissions since that time - both in August, one for bronchitis and the second for dyspnea. Since then, she had progressive dyspnea and fatigue but attributes this mainly to recent chemo and radiation tx for her lung cancer. Since her last visit, she has been doing ok, still has significant HUERTA when climbing stairs or walking far distance, she is however able to do housework as long as she goes slowly. She does complain of a productive (yellow sputum), her appetite is still surpressed but she does have less nausea. She is compliant with her medications. Mrs. Sanjeev Sanches does not watch her Na+ intake as her PO intake is markedly reduced d/t chemo tx and associated nausea, and she just eats what she can. Of note her repeat TTE showed normal LVEF. Review of Systems   Constitutional: Positive for fatigue. Negative for chills, fever, and weight loss. HEENT: Negative for blurred vision, sore throat   Respiratory: Positive for cough and shortness of breath. Cardiovascular: Negative for chest pain, palpitations, orthopnea, leg swelling and PND. Gastrointestinal: Negative for abdominal pain, constipation, diarrhea and nausea. Neurological: Negative for dizziness, tingling and headaches. Psychiatric/Behavioral: The patient does not have insomnia. Outpatient Encounter Prescriptions as of 11/14/2017   Medication Sig Dispense Refill    lisinopril (PRINIVIL, ZESTRIL) 20 mg tablet Take 1 Tab by mouth daily. 30 Tab 3    spironolactone (ALDACTONE) 25 mg tablet Take 1 Tab by mouth daily. 30 Tab 3    azithromycin (ZITHROMAX) 250 mg tablet Take 1 Tab by mouth daily.  30 Tab 3    albuterol (PROVENTIL VENTOLIN) 2.5 mg /3 mL (0.083 %) nebulizer solution 3 mL by Nebulization route every four (4) hours as needed for Wheezing. 24 Each 3    0.9% sodium chloride inhalation Take 5 mL by inhalation two (2) times a day. 100 mL 0    furosemide (LASIX) 20 mg tablet Take 0.5 Tabs by mouth as needed. 30 Tab 0    albuterol (PROVENTIL HFA, VENTOLIN HFA, PROAIR HFA) 90 mcg/actuation inhaler Take 2 Puffs by inhalation every six (6) hours as needed for Wheezing. 1 Inhaler 3    metoprolol succinate (TOPROL-XL) 50 mg XL tablet Take 1 Tab by mouth daily. 30 Tab 3    tiotropium (SPIRIVA WITH HANDIHALER) 18 mcg inhalation capsule Take 1 Cap by inhalation every evening. 30 Cap 2    traMADol (ULTRAM) 50 mg tablet Take 50 mg by mouth every six (6) hours as needed for Pain.  ondansetron hcl (ZOFRAN) 8 mg tablet Take 8 mg by mouth every eight (8) hours as needed for Nausea.  prochlorperazine (COMPAZINE) 10 mg tablet Take 10 mg by mouth every six (6) hours as needed for Nausea.  aspirin 81 mg chewable tablet Take 1 Tab by mouth daily. 30 Tab 0    budesonide-formoterol (SYMBICORT) 160-4.5 mcg/actuation HFAA Take 2 Puffs by inhalation two (2) times a day. 1 Inhaler 2    [DISCONTINUED] azithromycin (ZITHROMAX) 250 mg tablet Take 1 Tab by mouth daily. 30 Tab 1    [DISCONTINUED] spironolactone (ALDACTONE) 25 mg tablet Take 1 Tab by mouth daily. 30 Tab 1    [DISCONTINUED] lisinopril (PRINIVIL, ZESTRIL) 20 mg tablet Take 1 Tab by mouth daily. 30 Tab 1    [DISCONTINUED] albuterol (PROVENTIL VENTOLIN) 2.5 mg /3 mL (0.083 %) nebulizer solution 3 mL by Nebulization route every four (4) hours as needed for Wheezing. 24 Each 1     No facility-administered encounter medications on file as of 11/14/2017.         Patient Active Problem List   Diagnosis Code    Hypoxia R09.02    Multiple thyroid nodules E04.2    Tobacco use disorder O43.886    Systolic heart failure ([facility]) I50.20    S/P ICD (internal cardiac defibrillator) procedure Z95.810    Heart failure, left, with LVEF <=30% ([facility]) I50.1    H/O: lung cancer Z85. 80    NICM (nonischemic cardiomyopathy) ([facility]) I42.8    Panlobular emphysema ([facility]) J43.1    Acute bronchitis J20.9    Dehydration E86.0    SOB (shortness of breath) R06.02       Objective  Visit Vitals    /64 (BP 1 Location: Left arm, BP Patient Position: Sitting)    Pulse (!) 123    Temp 98.5 °F (36.9 °C) (Oral)    Resp 24    Ht 5' 5\" (1.651 m)    Wt 116 lb (52.6 kg)    SpO2 93%    BMI 19.3 kg/m2     Wt Readings from Last 3 Encounters:   11/14/17 116 lb (52.6 kg)   10/03/17 115 lb (52.2 kg)   08/04/17 116 lb 10 oz (52.9 kg)       Physical Exam   Constitutional: She is oriented to person, place, and time. She appears well-developed. No distress. HENT:   Head: Normocephalic and atraumatic. Nose: Nose normal.   Eyes: EOM are normal. Pupils are equal, round, and reactive to light. Neck: Normal range of motion. Neck supple. No JVD present. Cardiovascular: Normal rate, regular rhythm, soft heart sounds and intact distal pulses. Pulmonary/Chest: Effort normal. No respiratory distress. She has no wheezes. She has no rales. Diminished   Abdominal: Soft. Bowel sounds are normal. She exhibits no distension. There is no tenderness. Musculoskeletal: No edema. Neurological: She is alert and oriented to person, place, and time. Skin: Skin is warm and dry. Psychiatric: She has a normal mood and affect.  Her behavior is normal.       Lab Results   Component Value Date/Time    Sodium 129 08/25/2017 12:39 PM    Sodium 130 08/25/2017 12:39 PM    Potassium 4.1 08/25/2017 12:39 PM    Potassium 4.2 08/25/2017 12:39 PM    Chloride 95 08/25/2017 12:39 PM    Chloride 96 08/25/2017 12:39 PM    CO2 27 08/25/2017 12:39 PM    CO2 26 08/25/2017 12:39 PM    Anion gap 7 08/25/2017 12:39 PM    Anion gap 8 08/25/2017 12:39 PM    Glucose 105 08/25/2017 12:39 PM    Glucose 104 08/25/2017 12:39 PM    BUN 23 08/25/2017 12:39 PM    BUN 24 08/25/2017 12:39 PM    Creatinine 1.13 08/25/2017 12:39 PM    Creatinine 1.14 08/25/2017 12:39 PM    BUN/Creatinine ratio 20 08/25/2017 12:39 PM    BUN/Creatinine ratio 21 08/25/2017 12:39 PM    GFR est AA 59 08/25/2017 12:39 PM    GFR est AA 58 08/25/2017 12:39 PM    GFR est non-AA 48 08/25/2017 12:39 PM    GFR est non-AA 48 08/25/2017 12:39 PM    Calcium 8.3 08/25/2017 12:39 PM    Calcium 8.4 08/25/2017 12:39 PM     Lab Results   Component Value Date/Time    WBC 13.9 08/25/2017 12:39 PM    HGB 9.1 08/25/2017 12:39 PM    HCT 27.0 08/25/2017 12:39 PM    PLATELET 655 61/88/3809 12:39 PM    MCV 94.4 08/25/2017 12:39 PM       Cath 11/3/2016  SUMMARY:  --  CARDIAC STRUCTURES:  --  Analysis of regional contractile function demonstrated moderate global hypokinesis. --  Global left ventricular function was moderately depressed. EF estimated was 30%. --  CORONARY CIRCULATION:  --  Proximal LAD: There was a tubular 30 % stenosis. The lesion was eccentric. --  Proximal circumflex: There was a tubular 40 % stenosis. --  Proximal RCA: There was a discrete 50 % stenosis. Stress MIBI 10/28/2016  IMPRESSION  Impression:   Fixed defect in the inferoapical wall concerning for small infarct. No reversible abnormality is seen to suggest myocardium at ischemic risk. Left ventricular dilatation. Left ventricular ejection fraction is 28%. Global Hypokinesia. Echo   10/13/17:  LEFT VENTRICLE: Size was normal. Systolic function was normal. Ejection  fraction was estimated in the range of 55 % to 60 %. No obvious wall  motion abnormalities identified in the views obtained. Wall thickness was  normal.    RIGHT VENTRICLE: The size was normal. Systolic function was normal. Wall  thickness was normal. A pacing wire was present. LEFT ATRIUM: The atrium was mildly dilated. RIGHT ATRIUM: Size was normal.    MITRAL VALVE: Normal valve structure. There was normal leaflet separation.   DOPPLER: The transmitral velocity was within the normal range. There was  no evidence for stenosis. There was mild regurgitation. AORTIC VALVE: The valve was trileaflet. Leaflets exhibited normal  thickness and normal cuspal separation. DOPPLER: Transaortic velocity was  within the normal range. There was no stenosis. There was trivial  regurgitation. TRICUSPID VALVE: Normal valve structure. There was normal leaflet  separation. DOPPLER: The transtricuspid velocity was within the normal  range. There was no evidence for tricuspid stenosis. There was mild  regurgitation. Pulmonary artery systolic pressure: 35 mmHg. There was mild  pulmonary hypertension. PULMONIC VALVE: Not well visualized, but normal Doppler findings. AORTA: The root exhibited normal size. PERICARDIUM: Insignificant pericardial effusion and/or pericardial fat was  present. SYSTEM MEASUREMENT TABLES    2D  Ao Diam: 3 cm  LA Diam: 3.5 cm  LAAs A2C: 13.9 cm2  LAAs A4C: 16.2 cm2  LAESV A-L A2C: 32.5 ml  LAESV A-L A4C: 47.1 ml  LAESV Index (A-L): 25.9 ml/m2  LAESV MOD A2C: 31.5 ml  LAESV MOD A4C: 43.8 ml  LAESV(A-L): 40.4 ml  LALs A2C: 5 cm  LALs A4C: 4.7 cm  %FS: 31.8 %  EDV(Teich): 84.8 ml  EF(Teich): 60.1 %  ESV(Teich): 33.8 ml  IVSd: 0.9 cm  LVIDd: 4.3 cm  LVIDs: 3 cm  LVPWd: 1 cm  SV(Teich): 51 ml  RA Diam: 3 cm  RVIDd: 3.2 cm  IVC: 1.7 cm    CW  AV Env. Ti: 242.6 ms  AV VTI: 26.6 cm  AV Vmax: 1.6 m/s  AV Vmean: 1.1 m/s  AV maxPG: 10 mmHg  AV meanP.3 mmHg  TR Vmax: 2.6 m/s  TR maxP.1 mmHg  HR: 83.7 BPM    MM  TAPSE: 1.8 cm    PW  LVOT Env. Ti: 294 ms  LVOT VTI: 17.4 cm  LVOT Vmax: 0.9 m/s  LVOT Vmean: 0.6 m/s  LVOT maxPG: 3.5 mmHg  LVOT meanP.7 mmHg  E' Lat: 0.1 m/s  E' Sept: 0.1 m/s  E/E' Lat: 8.1  E/E' Sept: 11.7  MV A Jb: 0.9 m/s  MV Dec Baxter: 4.5 m/s2  MV DecT: 170 ms  MV E Jb: 0.8 m/s  MV E/A Ratio: 0.8  MV PHT: 49.3 ms  MVA By PHT: 4.5 cm2  RV S': 0.1 m/s    10/27/2016  SUMMARY:  Left ventricle: Size was at the upper limits of normal. Systolic function was mildly to moderately reduced. Ejection fraction was estimated to be 40% in the range of 35% to 45 %. Suboptimal endocardial visualization limits wall motion analysis. There was moderate diffuse hypokinesis. Wall thickness was moderately increased. PFTs 10/31/2016  FVC  1.44 (42%)  FEV1  0.37 (14%0  FEV1/FVC 0.26 (33%)  [id number] 0.18 (8%)    ASSESSMENT and PLAN    NICM HFrEF 55-60% NYHA class II:  1. Continue lisinopril and metoprolol  2. Cont furosemide to PRN. 3.  Repeat TTE to reassess EF showed an improvement from 35% to 55-60  4. Daily weights, regular diet (as patient needs to eat whatever she can tolerate). 5.  Labs this week     Lung CA:   1. Likely contributing to dyspnea  2. Mgmt per oncologist, Dr. Ron Gallagher, has first appointment with her tomorrow     Hypotension, intermittent and asmpyomatic  -Improving   1. Patient to monitor daily and report SBP < 90 mmHg  2. Will cont current regimen of BBrx and ACEI    High risk of SCD  1. S/p AICD, no shocks  2. Follows with Dr. Ramón Pascual    Follow-up Disposition:  Return in about 3 months (around 2/14/2018).     DIMITRIS Mark [id number]  [address] [place] [address]

## 2017-11-14 NOTE — LETTER
11/14/2017 2:37 PM 
 
Patient:  Pauly Wang YOB: 1950 Date of Visit: 11/14/2017 Dear Babs Villegas MD 
[address] [facility] [address] [facility] [address] VIA Facsimile: [phone] James Dubois MD 
[address] [facility] 7 [address] VIA In Basket Laura Cornejo MD 
[address] [facility] [address] [facility] Internal Medicine [facility] 7 [address] VIA In Basket 
 : Thank you for referring Ms. Kofi Rees to me for evaluation/treatment. Below are the relevant portions of my assessment and plan of care. [facility] Progress Note Ms. Conde is a 61year old woman with a history of a nonischemic cardiomyopathy and systolic heart failure. She also has significant COPD, and lung cancer with a prior left lobectomy. Ms. Conde was last seen in April 2017, at which time she denied significant complaint. She has had two admissions since that time - both in August, one for bronchitis and the second for dyspnea. Since then, she had progressive dyspnea and fatigue but attributes this mainly to recent chemo and radiation tx for her lung cancer. Since her last visit, she has been doing ok, still has significant HUERTA when climbing stairs or walking far distance, she is however able to do housework as long as she goes slowly. She does complain of a productive (yellow sputum), her appetite is still surpressed but she does have less nausea. She is compliant with her medications. Mrs. Conde does not watch her Na+ intake as her PO intake is markedly reduced d/t chemo tx and associated nausea, and she just eats what she can. Of note her repeat TTE showed normal LVEF. Review of Systems Constitutional: Positive for fatigue. Negative for chills, fever, and weight loss. HEENT: Negative for blurred vision, sore throat Respiratory: Positive for cough and shortness of breath. Cardiovascular: Negative for chest pain, palpitations, orthopnea, leg swelling and PND. Gastrointestinal: Negative for abdominal pain, constipation, diarrhea and nausea. Neurological: Negative for dizziness, tingling and headaches. Psychiatric/Behavioral: The patient does not have insomnia. Outpatient Encounter Prescriptions as of 11/14/2017 Medication Sig Dispense Refill  lisinopril (PRINIVIL, ZESTRIL) 20 mg tablet Take 1 Tab by mouth daily. 30 Tab 3  
 spironolactone (ALDACTONE) 25 mg tablet Take 1 Tab by mouth daily. 30 Tab 3  
 azithromycin (ZITHROMAX) 250 mg tablet Take 1 Tab by mouth daily. 30 Tab 3  
 albuterol (PROVENTIL VENTOLIN) 2.5 mg /3 mL (0.083 %) nebulizer solution 3 mL by Nebulization route every four (4) hours as needed for Wheezing. 24 Each 3  
 0.9% sodium chloride inhalation Take 5 mL by inhalation two (2) times a day. 100 mL 0  
 furosemide (LASIX) 20 mg tablet Take 0.5 Tabs by mouth as needed. 30 Tab 0  
 albuterol (PROVENTIL HFA, VENTOLIN HFA, PROAIR HFA) 90 mcg/actuation inhaler Take 2 Puffs by inhalation every six (6) hours as needed for Wheezing. 1 Inhaler 3  
 metoprolol succinate (TOPROL-XL) 50 mg XL tablet Take 1 Tab by mouth daily. 30 Tab 3  
 tiotropium (SPIRIVA WITH HANDIHALER) 18 mcg inhalation capsule Take 1 Cap by inhalation every evening. 30 Cap 2  
 traMADol (ULTRAM) 50 mg tablet Take 50 mg by mouth every six (6) hours as needed for Pain.  ondansetron hcl (ZOFRAN) 8 mg tablet Take 8 mg by mouth every eight (8) hours as needed for Nausea.  prochlorperazine (COMPAZINE) 10 mg tablet Take 10 mg by mouth every six (6) hours as needed for Nausea.  aspirin 81 mg chewable tablet Take 1 Tab by mouth daily. 30 Tab 0  
 budesonide-formoterol (SYMBICORT) 160-4.5 mcg/actuation HFAA Take 2 Puffs by inhalation two (2) times a day. 1 Inhaler 2  
 [DISCONTINUED] azithromycin (ZITHROMAX) 250 mg tablet Take 1 Tab by mouth daily.  30 Tab 1  
  [DISCONTINUED] spironolactone (ALDACTONE) 25 mg tablet Take 1 Tab by mouth daily. 30 Tab 1  [DISCONTINUED] lisinopril (PRINIVIL, ZESTRIL) 20 mg tablet Take 1 Tab by mouth daily. 30 Tab 1  [DISCONTINUED] albuterol (PROVENTIL VENTOLIN) 2.5 mg /3 mL (0.083 %) nebulizer solution 3 mL by Nebulization route every four (4) hours as needed for Wheezing. 24 Each 1 No facility-administered encounter medications on file as of 11/14/2017. Patient Active Problem List  
Diagnosis Code  Hypoxia R09.02  
 Multiple thyroid nodules E04.2  Tobacco use disorder F17.200  Systolic heart failure (HCC) I50.20  S/P ICD (internal cardiac defibrillator) procedure Z95.810  
 Heart failure, left, with LVEF <=30% (HCC) I50.1  H/O: lung cancer Z80. 80  
 NICM (nonischemic cardiomyopathy) ([address] I42.8  [facility] J43.1  Acute bronchitis J20.9  Dehydration E86.0  
 SOB (shortness of breath) R06.02  
 
 
Objective Visit Vitals  /64 (BP 1 Location: Left arm, BP Patient Position: Sitting)  Pulse (!) 123  Temp 98.5 °F (36.9 °C) (Oral)  Resp 24  
 Ht 5' 5\" (1.651 m)  Wt 116 lb (52.6 kg)  SpO2 93%  BMI 19.3 kg/m2 Wt Readings from Last 3 Encounters:  
11/14/17 116 lb (52.6 kg) 10/03/17 115 lb (52.2 kg) 08/04/17 116 lb 10 oz (52.9 kg) Physical Exam  
Constitutional: She is oriented to person, place, and time. She appears well-developed. No distress. HENT:  
Head: Normocephalic and atraumatic. Nose: Nose normal.  
Eyes: EOM are normal. Pupils are equal, round, and reactive to light. Neck: Normal range of motion. Neck supple. No JVD present. Cardiovascular: Normal rate, regular rhythm, soft heart sounds and intact distal pulses. Pulmonary/Chest: Effort normal. No respiratory distress. She has no wheezes. She has no rales. Diminished Abdominal: Soft. Bowel sounds are normal. She exhibits no distension. There is no tenderness. Musculoskeletal: No edema. Neurological: She is alert and oriented to person, place, and time. Skin: Skin is warm and dry. Psychiatric: She has a normal mood and affect. Her behavior is normal.  
 
 
Lab Results Component Value Date/Time Sodium 129 08/25/2017 12:39 PM  
 Sodium 130 08/25/2017 12:39 PM  
 Potassium 4.1 08/25/2017 12:39 PM  
 Potassium 4.2 08/25/2017 12:39 PM  
 Chloride 95 08/25/2017 12:39 PM  
 Chloride 96 08/25/2017 12:39 PM  
 CO2 27 08/25/2017 12:39 PM  
 CO2 26 08/25/2017 12:39 PM  
 Anion gap 7 08/25/2017 12:39 PM  
 Anion gap 8 08/25/2017 12:39 PM  
 Glucose 105 08/25/2017 12:39 PM  
 Glucose 104 08/25/2017 12:39 PM  
 BUN 23 08/25/2017 12:39 PM  
 BUN 24 08/25/2017 12:39 PM  
 Creatinine 1.13 08/25/2017 12:39 PM  
 Creatinine 1.14 08/25/2017 12:39 PM  
 BUN/Creatinine ratio 20 08/25/2017 12:39 PM  
 BUN/Creatinine ratio 21 08/25/2017 12:39 PM  
 GFR est AA 59 08/25/2017 12:39 PM  
 GFR est AA 58 08/25/2017 12:39 PM  
 GFR est non-AA 48 08/25/2017 12:39 PM  
 GFR est non-AA 48 08/25/2017 12:39 PM  
 Calcium 8.3 08/25/2017 12:39 PM  
 Calcium 8.4 08/25/2017 12:39 PM  
 
Lab Results Component Value Date/Time WBC 13.9 08/25/2017 12:39 PM  
 HGB 9.1 08/25/2017 12:39 PM  
 HCT 27.0 08/25/2017 12:39 PM  
 PLATELET 855 37/26/8933 12:39 PM  
 MCV 94.4 08/25/2017 12:39 PM  
 
 
Cath 11/3/2016 SUMMARY: 
--  CARDIAC STRUCTURES: 
--  Analysis of regional contractile function demonstrated moderate global hypokinesis. --  Global left ventricular function was moderately depressed. EF estimated was 30%. --  CORONARY CIRCULATION: 
--  Proximal LAD: There was a tubular 30 % stenosis. The lesion was eccentric. --  Proximal circumflex: There was a tubular 40 % stenosis. --  Proximal RCA: There was a discrete 50 % stenosis. Stress MIBI 10/28/2016 IMPRESSION Impression:  
Fixed defect in the inferoapical wall concerning for small infarct.  No reversible abnormality is seen to suggest myocardium at ischemic risk. Left ventricular dilatation. Left ventricular ejection fraction is 28%. Global Hypokinesia. Echo  
10/13/17: 
LEFT VENTRICLE: Size was normal. Systolic function was normal. Ejection 
fraction was estimated in the range of 55 % to 60 %. No obvious wall 
motion abnormalities identified in the views obtained. Wall thickness was 
normal. 
 
RIGHT VENTRICLE: The size was normal. Systolic function was normal. Wall 
thickness was normal. A pacing wire was present. LEFT ATRIUM: The atrium was mildly dilated. RIGHT ATRIUM: Size was normal. 
 
MITRAL VALVE: Normal valve structure. There was normal leaflet separation. DOPPLER: The transmitral velocity was within the normal range. There was 
no evidence for stenosis. There was mild regurgitation. AORTIC VALVE: The valve was trileaflet. Leaflets exhibited normal 
thickness and normal cuspal separation. DOPPLER: Transaortic velocity was 
within the normal range. There was no stenosis. There was trivial 
regurgitation. TRICUSPID VALVE: Normal valve structure. There was normal leaflet 
separation. DOPPLER: The transtricuspid velocity was within the normal 
range. There was no evidence for tricuspid stenosis. There was mild 
regurgitation. Pulmonary artery systolic pressure: 35 mmHg. There was mild 
pulmonary hypertension. PULMONIC VALVE: Not well visualized, but normal Doppler findings. AORTA: The root exhibited normal size. PERICARDIUM: Insignificant pericardial effusion and/or pericardial fat was 
present. SYSTEM MEASUREMENT TABLES 
 
2D Ao Diam: 3 cm 
LA Diam: 3.5 cm LAAs A2C: 13.9 cm2 LAAs A4C: 16.2 cm2 LAESV A-L A2C: 32.5 ml 
LAESV A-L A4C: 47.1 ml 
LAESV Index (A-L): 25.9 ml/m2 LAESV MOD A2C: 31.5 ml 
LAESV MOD A4C: 43.8 ml 
LAESV(A-L): 40.4 ml LALs A2C: 5 cm LALs A4C: 4.7 cm 
%FS: 31.8 % EDV(Teich): 84.8 ml 
EF(Teich): 60.1 % 
ESV(Teich): 33.8 ml IVSd: 0.9 cm 
 LVIDd: 4.3 cm LVIDs: 3 cm 
LVPWd: 1 cm 
SV(Teich): 51 ml 
RA Diam: 3 cm RVIDd: 3.2 cm IVC: 1.7 cm 
 
CW 
AV Env. Ti: 242.6 ms 
AV VTI: 26.6 cm 
AV Vmax: 1.6 m/s AV Vmean: 1.1 m/s AV maxPG: 10 mmHg AV meanP.3 mmHg TR Vmax: 2.6 m/s 
TR maxP.1 mmHg HR: 83.7 BPM 
 
MM 
TAPSE: 1.8 cm PW 
LVOT Env. Ti: 294 ms LVOT VTI: 17.4 cm 
LVOT Vmax: 0.9 m/s LVOT Vmean: 0.6 m/s LVOT maxPG: 3.5 mmHg LVOT meanP.7 mmHg E' Lat: 0.1 m/s 
E' Sept: 0.1 m/s 
E/E' Lat: 8.1 E/E' Sept: 11.7 MV A Jb: 0.9 m/s 
MV Dec Gr[place]: 4.5 m/s2 MV DecT: 170 ms MV E Jb: 0.8 m/s 
MV E/A Ratio: 0.8 MV PHT: 49.3 ms 
MVA By PHT: 4.5 cm2 
RV S': 0.1 m/s 
 
10/27/2016 SUMMARY: 
Left ventricle: Size was at the upper limits of normal. Systolic function was mildly to moderately reduced. Ejection fraction was estimated to be 40% in the range of 35% to 45 %. Suboptimal endocardial visualization limits wall motion analysis. There was moderate diffuse hypokinesis. Wall thickness was moderately increased. PFTs 10/31/2016 FVC  1.44 (42%) FEV1  0.37 (14%0 FEV1/FVC 0.26 (33%) [id number] 0.18 (8%) ASSESSMENT and PLAN 
 
NICM HFrEF 55-60% NYHA class II: 
1. Continue lisinopril and metoprolol 2. Cont furosemide to PRN. 3.  Repeat TTE to reassess EF showed an improvement from 35% to 55-60 4. Daily weights, regular diet (as patient needs to eat whatever she can tolerate). 5.  Labs this week Lung CA: 1. Likely contributing to dyspnea 2. Mgmt per oncologist, Dr. Anika Hamm, has first appointment with her tomorrow Hypotension, intermittent and asmpyomatic 
-Improving 1. Patient to monitor daily and report SBP < 90 mmHg 2. Will cont current regimen of BBrx and ACEI High risk of SCD 1. S/p AICD, no shocks 2. Follows with Dr. Arturo Gamez Follow-up Disposition: 
Return in about 3 months (around 2018). [facility] [place], NP Whit Rubi Alvarado [address] [place] [phone] If you have questions, please do not hesitate to call me. I look forward to following Ms. Girard along with you. Sincerely, Jaskaran Harden MD

## 2017-11-14 NOTE — PATIENT INSTRUCTIONS
Your TTE showed a normal ejection fraction- this is great news    Please cont all medications as directed    Labs this week, either with Dr. Jitendra Yadav or the ones we ordered if she does not get blood work    Follow up in 3 months with Dr. Nicole Vázquez.

## 2017-11-14 NOTE — MR AVS SNAPSHOT
Visit Information Date & Time Provider Department Dept. Phone Encounter #  
 11/14/2017  1:00 PM Joni Soto MD [address] [id number] Your Appointments 12/13/2017  2:15 PM  
New Patient with Sofi Matthews MD  
[facility] Internal Medicine [address]) Appt Note: establish --- referred by [facility] [address] [facility] [address] [place] [address]  
[address] [place]  
  
    
 3/19/2018  1:45 PM  
PACEMAKER with Brandy Kidd [facility] (LANCE SCHEDULING) Appt Note: sjm icd, rc, annual thresh/M, see [address] [place] [address]  
[address] [facility]  
  
    
 3/19/2018  2:00 PM  
ESTABLISHED PATIENT with Zuleyma Teresa MD  
[facility] ([address]) Appt Note: sjm icd, rc, annual thresh/M, see [address] [place] [address]  
[address] [address]  
  
    
  
 12/19/2017 10:00 AM  
REMOTE OFFICE VISIT with Radha Plummer [facility] (LANCE SCHEDULING) Appt Note: sjm icd, rc  
 [address] [facility] [address] [place] [address]  
[address] [facility] 7 [address] Upcoming Health Maintenance Date Due Hepatitis C Screening 1950 DTaP/Tdap/Td series (1 - Tdap) 10/20/1973 PAP AKA CERVICAL CYTOLOGY 10/20/1973 BREAST CANCER SCRN MAMMOGRAM 10/20/2002 FOBT Q 1 YEAR AGE 50-75 10/20/2002 ZOSTER VACCINE AGE 60> 8/20/2012 Influenza Age 5 to Adult 8/1/2017 GLAUCOMA SCREENING Q2Y 10/20/2017 OSTEOPOROSIS SCREENING (DEXA) 10/20/2017 Pneumococcal 65+ High/Highest Risk (2 of 2 - PPSV23) 10/20/2017 MEDICARE YEARLY EXAM 10/20/2017 Allergies as of 11/14/2017  Review Complete On: 11/14/2017 By: Lynda Nasim No Known Allergies Current Immunizations  Reviewed on 12/30/2016 Name Date Influenza Vaccine 10/15/2014 Influenza Vaccine Tete Bloodgood) 10/10/2016 Pneumococcal Vaccine (Unspecified Type) 11/21/2010 Not reviewed this visit You Were Diagnosed With   
  
 Codes Comments Chronic systolic heart failure (HCC)    -  Primary ICD-10-CM: W25.65 ICD-9-CM: 428.22   
 NICM (nonischemic cardiomyopathy) ([facility]     ICD-10-CM: I42.8 ICD-9-CM: 425.4 Panlobular emphysema ([facility]     ICD-10-CM: J43.1 ICD-9-CM: 492.8 H/O: lung cancer     ICD-10-CM: Z85.118 
ICD-9-CM: V10.11 Vitals BP Pulse Temp Resp Height(growth percentile) Weight(growth percentile) 104/64 (BP 1 Location: Left arm, BP Patient Position: Sitting) (!) 123 98.5 °F (36.9 °C) (Oral) 24 5' 5\" (1.651 m) 116 lb (52.6 kg) SpO2 BMI OB Status Smoking Status 93% 19.3 kg/m2 Hysterectomy Former Smoker Vitals History BMI and BSA Data Body Mass Index Body Surface Area  
 19.3 kg/m 2 1.55 m 2 Preferred Pharmacy Pharmacy Name Phone [facility] [address], [address] [phone] Your Updated Medication List  
  
   
This list is accurate as of: 11/14/17  1:43 PM.  Always use your most recent med list.  
  
  
  
  
 0.9% sodium chloride inhalation Take 5 mL by inhalation two (2) times a day. * albuterol 90 mcg/actuation inhaler Commonly known as:  PROVENTIL HFA, VENTOLIN HFA, PROAIR HFA Take 2 Puffs by inhalation every six (6) hours as needed for Wheezing. * albuterol 2.5 mg /3 mL (0.083 %) nebulizer solution Commonly known as:  PROVENTIL VENTOLIN  
3 mL by Nebulization route every four (4) hours as needed for Wheezing. aspirin 81 mg chewable tablet Take 1 Tab by mouth daily. azithromycin 250 mg tablet Commonly known as:  Chata Dejesus Take 1 Tab by mouth daily. budesonide-formoterol 160-4.5 mcg/actuation Hfaa Commonly known as:  SYMBICORT Take 2 Puffs by inhalation two (2) times a day. furosemide 20 mg tablet Commonly known as:  LASIX Take 0.5 Tabs by mouth as needed. lisinopril 20 mg tablet Commonly known as:  Torres Kb Take 1 Tab by mouth daily. metoprolol succinate 50 mg XL tablet Commonly known as:  TOPROL-XL Take 1 Tab by mouth daily. ondansetron hcl 8 mg tablet Commonly known as:  Melvena Buddhist Take 8 mg by mouth every eight (8) hours as needed for Nausea. prochlorperazine 10 mg tablet Commonly known as:  COMPAZINE Take 10 mg by mouth every six (6) hours as needed for Nausea. spironolactone 25 mg tablet Commonly known as:  ALDACTONE Take 1 Tab by mouth daily. tiotropium 18 mcg inhalation capsule Commonly known as:  [address] Take 1 Cap by inhalation every evening. traMADol 50 mg tablet Commonly known as:  ULTRAM  
Take 50 mg by mouth every six (6) hours as needed for Pain. * Notice: This list has 2 medication(s) that are the same as other medications prescribed for you. Read the directions carefully, and ask your doctor or other care provider to review them with you. Prescriptions Sent to Pharmacy Refills  
 lisinopril (PRINIVIL, ZESTRIL) 20 mg tablet 3 Sig: Take 1 Tab by mouth daily. Class: Normal  
 Pharmacy: [facility] [address] [place] [address] , [address] [facility] [place], [place] 2 Ph #: [phone] Route: Oral  
 spironolactone (ALDACTONE) 25 mg tablet 3 Sig: Take 1 Tab by mouth daily. Class: Normal  
 Pharmacy: [facility] [address] [place] [address] , [address] [facility] [place], [place] 2 Ph #: [phone] Route: Oral  
 azithromycin (ZITHROMAX) 250 mg tablet 3 Sig: Take 1 Tab by mouth daily. Class: Normal  
 Pharmacy: [facility] [address] [place] [address] , [address] [facility] [place], [place] 2 Ph #: [phone] Route: Oral  
 albuterol (PROVENTIL VENTOLIN) 2.5 mg /3 mL (0.083 %) nebulizer solution 3 Sig: 3 mL by Nebulization route every four (4) hours as needed for Wheezing. Class: Normal  
 Pharmacy: [facility] [address] [place] [address] , [address] [facility] [address] #: [phone] Route: Nebulization We Performed the Following METABOLIC PANEL, COMPREHENSIVE [68840 CPT(R)] NT-PRO BNP [id number] CPT(R)] Patient Instructions Your TTE showed a normal ejection fraction- this is great news Please cont all medications as directed Labs this week, either with Dr. Ilene John or the ones we ordered if she does not get blood work Follow up in 3 months with Dr. Eliezer Perez. Introducing [address]! [facility] introduces [employer] patient portal. Now you can access parts of your medical record, email your doctor's office, and request medication refills online. 1. In your internet browser, go to [email or web address] 2. Click on the First Time User? Click Here link in the Sign In box. You will see the New Member Sign Up page. 3. Enter your My[employer]Points Access Code exactly as it appears below. You will not need to use this code after youve completed the sign-up process. If you do not sign up before the expiration date, you must request a new code. · My[employer] Access Code: [email or web address] Expires: 12/11/2017 12:50 PM 
 
4. Enter the last four digits of your Social Security Number (xxxx) and Date of Birth (mm/dd/yyyy) as indicated and click Submit. You will be taken to the next sign-up page. 5. Create a [place] ID. This will be your MyGoodPoints login ID and cannot be changed, so think of one that is secure and easy to remember. 6. Create a [employer] password. You can change your password at any time. 7. Enter your Password Reset Question and Answer. This can be used at a later time if you forget your password. 8. Enter your e-mail address.  You will receive e-mail notification when new information is available in [employer]. 9. Click Sign Up. You can now view and download portions of your medical record. 10. Click the Download Summary menu link to download a portable copy of your medical information. If you have questions, please visit the Frequently Asked Questions section of the [employer] website. Remember, [employer] is NOT to be used for urgent needs. For medical emergencies, dial 911. Now available from your iPhone and Android! Please provide this summary of care documentation to your next provider. Your primary care clinician is listed as Celsa Apodaca. If you have any questions after today's visit, please call [phone].

## 2017-11-14 NOTE — COMMUNICATION BODY
[facility] Progress Note    Ms. Rene Martinez is a 61year old woman with a history of a nonischemic cardiomyopathy and systolic heart failure. She also has significant COPD, and lung cancer with a prior left lobectomy. Ms. Rene Martinez was last seen in April 2017, at which time she denied significant complaint. She has had two admissions since that time - both in August, one for bronchitis and the second for dyspnea. Since then, she had progressive dyspnea and fatigue but attributes this mainly to recent chemo and radiation tx for her lung cancer. Since her last visit, she has been doing ok, still has significant HUERTA when climbing stairs or walking far distance, she is however able to do housework as long as she goes slowly. She does complain of a productive (yellow sputum), her appetite is still surpressed but she does have less nausea. She is compliant with her medications. Mrs. Rene Martinez does not watch her Na+ intake as her PO intake is markedly reduced d/t chemo tx and associated nausea, and she just eats what she can. Of note her repeat TTE showed normal LVEF. Review of Systems   Constitutional: Positive for fatigue. Negative for chills, fever, and weight loss. HEENT: Negative for blurred vision, sore throat   Respiratory: Positive for cough and shortness of breath. Cardiovascular: Negative for chest pain, palpitations, orthopnea, leg swelling and PND. Gastrointestinal: Negative for abdominal pain, constipation, diarrhea and nausea. Neurological: Negative for dizziness, tingling and headaches. Psychiatric/Behavioral: The patient does not have insomnia. Outpatient Encounter Prescriptions as of 11/14/2017   Medication Sig Dispense Refill    lisinopril (PRINIVIL, ZESTRIL) 20 mg tablet Take 1 Tab by mouth daily. 30 Tab 3    spironolactone (ALDACTONE) 25 mg tablet Take 1 Tab by mouth daily. 30 Tab 3    azithromycin (ZITHROMAX) 250 mg tablet Take 1 Tab by mouth daily.  30 Tab 3    albuterol (PROVENTIL VENTOLIN) 2.5 mg /3 mL (0.083 %) nebulizer solution 3 mL by Nebulization route every four (4) hours as needed for Wheezing. 24 Each 3    0.9% sodium chloride inhalation Take 5 mL by inhalation two (2) times a day. 100 mL 0    furosemide (LASIX) 20 mg tablet Take 0.5 Tabs by mouth as needed. 30 Tab 0    albuterol (PROVENTIL HFA, VENTOLIN HFA, PROAIR HFA) 90 mcg/actuation inhaler Take 2 Puffs by inhalation every six (6) hours as needed for Wheezing. 1 Inhaler 3    metoprolol succinate (TOPROL-XL) 50 mg XL tablet Take 1 Tab by mouth daily. 30 Tab 3    tiotropium (SPIRIVA WITH HANDIHALER) 18 mcg inhalation capsule Take 1 Cap by inhalation every evening. 30 Cap 2    traMADol (ULTRAM) 50 mg tablet Take 50 mg by mouth every six (6) hours as needed for Pain.  ondansetron hcl (ZOFRAN) 8 mg tablet Take 8 mg by mouth every eight (8) hours as needed for Nausea.  prochlorperazine (COMPAZINE) 10 mg tablet Take 10 mg by mouth every six (6) hours as needed for Nausea.  aspirin 81 mg chewable tablet Take 1 Tab by mouth daily. 30 Tab 0    budesonide-formoterol (SYMBICORT) 160-4.5 mcg/actuation HFAA Take 2 Puffs by inhalation two (2) times a day. 1 Inhaler 2    [DISCONTINUED] azithromycin (ZITHROMAX) 250 mg tablet Take 1 Tab by mouth daily. 30 Tab 1    [DISCONTINUED] spironolactone (ALDACTONE) 25 mg tablet Take 1 Tab by mouth daily. 30 Tab 1    [DISCONTINUED] lisinopril (PRINIVIL, ZESTRIL) 20 mg tablet Take 1 Tab by mouth daily. 30 Tab 1    [DISCONTINUED] albuterol (PROVENTIL VENTOLIN) 2.5 mg /3 mL (0.083 %) nebulizer solution 3 mL by Nebulization route every four (4) hours as needed for Wheezing. 24 Each 1     No facility-administered encounter medications on file as of 11/14/2017.         Patient Active Problem List   Diagnosis Code    Hypoxia R09.02    Multiple thyroid nodules E04.2    Tobacco use disorder G30.087    Systolic heart failure (HCC) I50.20    S/P ICD (internal cardiac defibrillator) procedure Z95.810    Heart failure, left, with LVEF <=30% ([facility]) I50.1    H/O: lung cancer Z85. 80    NICM (nonischemic cardiomyopathy) ([facility]) I42.8    Panlobular emphysema ([facility]) J43.1    Acute bronchitis J20.9    Dehydration E86.0    SOB (shortness of breath) R06.02       Objective  Visit Vitals    /64 (BP 1 Location: Left arm, BP Patient Position: Sitting)    Pulse (!) 123    Temp 98.5 °F (36.9 °C) (Oral)    Resp 24    Ht 5' 5\" (1.651 m)    Wt 116 lb (52.6 kg)    SpO2 93%    BMI 19.3 kg/m2     Wt Readings from Last 3 Encounters:   11/14/17 116 lb (52.6 kg)   10/03/17 115 lb (52.2 kg)   08/04/17 116 lb 10 oz (52.9 kg)       Physical Exam   Constitutional: She is oriented to person, place, and time. She appears well-developed. No distress. HENT:   Head: Normocephalic and atraumatic. Nose: Nose normal.   Eyes: EOM are normal. Pupils are equal, round, and reactive to light. Neck: Normal range of motion. Neck supple. No JVD present. Cardiovascular: Normal rate, regular rhythm, soft heart sounds and intact distal pulses. Pulmonary/Chest: Effort normal. No respiratory distress. She has no wheezes. She has no rales. Diminished   Abdominal: Soft. Bowel sounds are normal. She exhibits no distension. There is no tenderness. Musculoskeletal: No edema. Neurological: She is alert and oriented to person, place, and time. Skin: Skin is warm and dry. Psychiatric: She has a normal mood and affect.  Her behavior is normal.       Lab Results   Component Value Date/Time    Sodium 129 08/25/2017 12:39 PM    Sodium 130 08/25/2017 12:39 PM    Potassium 4.1 08/25/2017 12:39 PM    Potassium 4.2 08/25/2017 12:39 PM    Chloride 95 08/25/2017 12:39 PM    Chloride 96 08/25/2017 12:39 PM    CO2 27 08/25/2017 12:39 PM    CO2 26 08/25/2017 12:39 PM    Anion gap 7 08/25/2017 12:39 PM    Anion gap 8 08/25/2017 12:39 PM    Glucose 105 08/25/2017 12:39 PM    Glucose 104 08/25/2017 12:39 PM    BUN 23 08/25/2017 12:39 PM    BUN 24 08/25/2017 12:39 PM    Creatinine 1.13 08/25/2017 12:39 PM    Creatinine 1.14 08/25/2017 12:39 PM    BUN/Creatinine ratio 20 08/25/2017 12:39 PM    BUN/Creatinine ratio 21 08/25/2017 12:39 PM    GFR est AA 59 08/25/2017 12:39 PM    GFR est AA 58 08/25/2017 12:39 PM    GFR est non-AA 48 08/25/2017 12:39 PM    GFR est non-AA 48 08/25/2017 12:39 PM    Calcium 8.3 08/25/2017 12:39 PM    Calcium 8.4 08/25/2017 12:39 PM     Lab Results   Component Value Date/Time    WBC 13.9 08/25/2017 12:39 PM    HGB 9.1 08/25/2017 12:39 PM    HCT 27.0 08/25/2017 12:39 PM    PLATELET 184 02/65/7354 12:39 PM    MCV 94.4 08/25/2017 12:39 PM       Cath 11/3/2016  SUMMARY:  --  CARDIAC STRUCTURES:  --  Analysis of regional contractile function demonstrated moderate global hypokinesis. --  Global left ventricular function was moderately depressed. EF estimated was 30%. --  CORONARY CIRCULATION:  --  Proximal LAD: There was a tubular 30 % stenosis. The lesion was eccentric. --  Proximal circumflex: There was a tubular 40 % stenosis. --  Proximal RCA: There was a discrete 50 % stenosis. Stress MIBI 10/28/2016  IMPRESSION  Impression:   Fixed defect in the inferoapical wall concerning for small infarct. No reversible abnormality is seen to suggest myocardium at ischemic risk. Left ventricular dilatation. Left ventricular ejection fraction is 28%. Global Hypokinesia. Echo   10/13/17:  LEFT VENTRICLE: Size was normal. Systolic function was normal. Ejection  fraction was estimated in the range of 55 % to 60 %. No obvious wall  motion abnormalities identified in the views obtained. Wall thickness was  normal.    RIGHT VENTRICLE: The size was normal. Systolic function was normal. Wall  thickness was normal. A pacing wire was present. LEFT ATRIUM: The atrium was mildly dilated. RIGHT ATRIUM: Size was normal.    MITRAL VALVE: Normal valve structure. There was normal leaflet separation.   DOPPLER: The transmitral velocity was within the normal range. There was  no evidence for stenosis. There was mild regurgitation. AORTIC VALVE: The valve was trileaflet. Leaflets exhibited normal  thickness and normal cuspal separation. DOPPLER: Transaortic velocity was  within the normal range. There was no stenosis. There was trivial  regurgitation. TRICUSPID VALVE: Normal valve structure. There was normal leaflet  separation. DOPPLER: The transtricuspid velocity was within the normal  range. There was no evidence for tricuspid stenosis. There was mild  regurgitation. Pulmonary artery systolic pressure: 35 mmHg. There was mild  pulmonary hypertension. PULMONIC VALVE: Not well visualized, but normal Doppler findings. AORTA: The root exhibited normal size. PERICARDIUM: Insignificant pericardial effusion and/or pericardial fat was  present. SYSTEM MEASUREMENT TABLES    2D  Ao Diam: 3 cm  LA Diam: 3.5 cm  LAAs A2C: 13.9 cm2  LAAs A4C: 16.2 cm2  LAESV A-L A2C: 32.5 ml  LAESV A-L A4C: 47.1 ml  LAESV Index (A-L): 25.9 ml/m2  LAESV MOD A2C: 31.5 ml  LAESV MOD A4C: 43.8 ml  LAESV(A-L): 40.4 ml  LALs A2C: 5 cm  LALs A4C: 4.7 cm  %FS: 31.8 %  EDV(Teich): 84.8 ml  EF(Teich): 60.1 %  ESV(Teich): 33.8 ml  IVSd: 0.9 cm  LVIDd: 4.3 cm  LVIDs: 3 cm  LVPWd: 1 cm  SV(Teich): 51 ml  RA Diam: 3 cm  RVIDd: 3.2 cm  IVC: 1.7 cm    CW  AV Env. Ti: 242.6 ms  AV VTI: 26.6 cm  AV Vmax: 1.6 m/s  AV Vmean: 1.1 m/s  AV maxPG: 10 mmHg  AV meanP.3 mmHg  TR Vmax: 2.6 m/s  TR maxP.1 mmHg  HR: 83.7 BPM    MM  TAPSE: 1.8 cm    PW  LVOT Env. Ti: 294 ms  LVOT VTI: 17.4 cm  LVOT Vmax: 0.9 m/s  LVOT Vmean: 0.6 m/s  LVOT maxPG: 3.5 mmHg  LVOT meanP.7 mmHg  E' Lat: 0.1 m/s  E' Sept: 0.1 m/s  E/E' Lat: 8.1  E/E' Sept: 11.7  MV A Jb: 0.9 m/s  MV Dec [place]: 4.5 m/s2  MV DecT: 170 ms  MV E Jb: 0.8 m/s  MV E/A Ratio: 0.8  MV PHT: 49.3 ms  MVA By PHT: 4.5 cm2  RV S': 0.1 m/s    10/27/2016  SUMMARY:  Left ventricle: Size was at the upper limits of normal. Systolic function was mildly to moderately reduced. Ejection fraction was estimated to be 40% in the range of 35% to 45 %. Suboptimal endocardial visualization limits wall motion analysis. There was moderate diffuse hypokinesis. Wall thickness was moderately increased. PFTs 10/31/2016  FVC  1.44 (42%)  FEV1  0.37 (14%0  FEV1/FVC 0.26 (33%)  UMR51-35 0.18 (8%)    ASSESSMENT and PLAN    NICM HFrEF 55-60% NYHA class II:  1. Continue lisinopril and metoprolol  2. Cont furosemide to PRN. 3.  Repeat TTE to reassess EF showed an improvement from 35% to 55-60  4. Daily weights, regular diet (as patient needs to eat whatever she can tolerate). 5.  Labs this week     Lung CA:   1. Likely contributing to dyspnea  2. Mgmt per oncologist, Dr. Donald Roper, has first appointment with her tomorrow     Hypotension, intermittent and asmpyomatic  -Improving   1. Patient to monitor daily and report SBP < 90 mmHg  2. Will cont current regimen of BBrx and ACEI    High risk of SCD  1. S/p AICD, no shocks  2. Follows with Dr. Chip Culp    Follow-up Disposition:  Return in about 3 months (around 2/14/2018).     Hanna Paige, DIMITRIS [id number]  [place] [id number]

## 2018-01-01 ENCOUNTER — HOSPITAL ENCOUNTER (INPATIENT)
Age: 68
LOS: 1 days | DRG: 181 | End: 2018-02-22
Attending: INTERNAL MEDICINE | Admitting: INTERNAL MEDICINE
Payer: OTHER MISCELLANEOUS

## 2018-01-01 ENCOUNTER — HOSPITAL ENCOUNTER (INPATIENT)
Age: 68
LOS: 3 days | Discharge: HOSPICE | DRG: 191 | End: 2018-02-22
Attending: EMERGENCY MEDICINE | Admitting: INTERNAL MEDICINE
Payer: MEDICARE

## 2018-01-01 ENCOUNTER — APPOINTMENT (OUTPATIENT)
Dept: CT IMAGING | Age: 68
DRG: 191 | End: 2018-01-01
Attending: EMERGENCY MEDICINE
Payer: MEDICARE

## 2018-01-01 ENCOUNTER — APPOINTMENT (OUTPATIENT)
Dept: GENERAL RADIOLOGY | Age: 68
DRG: 191 | End: 2018-01-01
Attending: HOSPITALIST
Payer: MEDICARE

## 2018-01-01 ENCOUNTER — APPOINTMENT (OUTPATIENT)
Dept: GENERAL RADIOLOGY | Age: 68
DRG: 191 | End: 2018-01-01
Attending: EMERGENCY MEDICINE
Payer: MEDICARE

## 2018-01-01 ENCOUNTER — HOSPICE ADMISSION (OUTPATIENT)
Dept: HOSPICE | Facility: HOSPICE | Age: 68
End: 2018-01-01
Payer: MEDICARE

## 2018-01-01 ENCOUNTER — HOSPITAL ENCOUNTER (OUTPATIENT)
Dept: PET IMAGING | Age: 68
Discharge: HOME | End: 2018-01-08
Attending: INTERNAL MEDICINE
Payer: MEDICARE

## 2018-01-01 ENCOUNTER — TELEPHONE (OUTPATIENT)
Dept: ONCOLOGY | Age: 68
End: 2018-01-01

## 2018-01-01 ENCOUNTER — APPOINTMENT (OUTPATIENT)
Dept: CT IMAGING | Age: 68
DRG: 191 | End: 2018-01-01
Attending: INTERNAL MEDICINE
Payer: MEDICARE

## 2018-01-01 ENCOUNTER — HOSPITAL ENCOUNTER (OUTPATIENT)
Dept: CT IMAGING | Age: 68
Discharge: HOME | End: 2018-01-31
Attending: INTERNAL MEDICINE
Payer: MEDICARE

## 2018-01-01 ENCOUNTER — TELEPHONE (OUTPATIENT)
Dept: CARDIOLOGY CLINIC | Age: 68
End: 2018-01-01

## 2018-01-01 ENCOUNTER — HOME CARE VISIT (OUTPATIENT)
Dept: HOSPICE | Facility: HOSPICE | Age: 68
End: 2018-01-01
Payer: MEDICARE

## 2018-01-01 ENCOUNTER — APPOINTMENT (OUTPATIENT)
Dept: ULTRASOUND IMAGING | Age: 68
DRG: 191 | End: 2018-01-01
Attending: FAMILY MEDICINE
Payer: MEDICARE

## 2018-01-01 ENCOUNTER — APPOINTMENT (OUTPATIENT)
Dept: GENERAL RADIOLOGY | Age: 68
DRG: 191 | End: 2018-01-01
Attending: INTERNAL MEDICINE
Payer: MEDICARE

## 2018-01-01 VITALS
HEIGHT: 66 IN | WEIGHT: 117.95 LBS | TEMPERATURE: 97 F | OXYGEN SATURATION: 94 % | BODY MASS INDEX: 18.96 KG/M2 | DIASTOLIC BLOOD PRESSURE: 56 MMHG | RESPIRATION RATE: 6 BRPM | SYSTOLIC BLOOD PRESSURE: 93 MMHG | HEART RATE: 85 BPM

## 2018-01-01 VITALS
SYSTOLIC BLOOD PRESSURE: 112 MMHG | HEIGHT: 66 IN | WEIGHT: 113 LBS | HEART RATE: 81 BPM | RESPIRATION RATE: 19 BRPM | OXYGEN SATURATION: 100 % | BODY MASS INDEX: 18.16 KG/M2 | DIASTOLIC BLOOD PRESSURE: 63 MMHG | TEMPERATURE: 97.8 F

## 2018-01-01 VITALS — WEIGHT: 113 LBS | BODY MASS INDEX: 18.16 KG/M2 | HEIGHT: 66 IN

## 2018-01-01 DIAGNOSIS — Z85.118 H/O: LUNG CANCER: ICD-10-CM

## 2018-01-01 DIAGNOSIS — R53.0 NEOPLASTIC MALIGNANT RELATED FATIGUE: ICD-10-CM

## 2018-01-01 DIAGNOSIS — C34.91 PRIMARY LUNG CANCER WITH METASTASIS FROM LUNG TO OTHER SITE, RIGHT (HCC): ICD-10-CM

## 2018-01-01 DIAGNOSIS — R10.11 RUQ PAIN: ICD-10-CM

## 2018-01-01 DIAGNOSIS — R91.1 SOLITARY PULMONARY NODULE: ICD-10-CM

## 2018-01-01 DIAGNOSIS — R06.02 SHORTNESS OF BREATH: ICD-10-CM

## 2018-01-01 DIAGNOSIS — R06.02 SOB (SHORTNESS OF BREATH): ICD-10-CM

## 2018-01-01 DIAGNOSIS — E87.1 HYPONATREMIA: ICD-10-CM

## 2018-01-01 DIAGNOSIS — J43.1 PANLOBULAR EMPHYSEMA (HCC): ICD-10-CM

## 2018-01-01 DIAGNOSIS — I42.8 NICM (NONISCHEMIC CARDIOMYOPATHY) (HCC): ICD-10-CM

## 2018-01-01 DIAGNOSIS — I50.22 CHRONIC SYSTOLIC HEART FAILURE (HCC): ICD-10-CM

## 2018-01-01 DIAGNOSIS — R53.83 FATIGUE, UNSPECIFIED TYPE: ICD-10-CM

## 2018-01-01 DIAGNOSIS — G89.3 CANCER RELATED PAIN: ICD-10-CM

## 2018-01-01 DIAGNOSIS — R06.03 ACUTE RESPIRATORY DISTRESS: Primary | ICD-10-CM

## 2018-01-01 DIAGNOSIS — R11.0 NAUSEA: ICD-10-CM

## 2018-01-01 DIAGNOSIS — J44.1 ACUTE EXACERBATION OF CHRONIC OBSTRUCTIVE PULMONARY DISEASE (COPD) (HCC): ICD-10-CM

## 2018-01-01 LAB
ALBUMIN SERPL-MCNC: 2.8 G/DL (ref 3.5–5)
ALBUMIN SERPL-MCNC: 2.8 G/DL (ref 3.5–5)
ALBUMIN SERPL-MCNC: 3 G/DL (ref 3.5–5)
ALBUMIN SERPL-MCNC: 3 G/DL (ref 3.5–5)
ALBUMIN/GLOB SERPL: 0.7 {RATIO} (ref 1.1–2.2)
ALBUMIN/GLOB SERPL: 0.8 {RATIO} (ref 1.1–2.2)
ALBUMIN/GLOB SERPL: 0.9 {RATIO} (ref 1.1–2.2)
ALBUMIN/GLOB SERPL: 1 {RATIO} (ref 1.1–2.2)
ALP SERPL-CCNC: 210 U/L (ref 45–117)
ALP SERPL-CCNC: 212 U/L (ref 45–117)
ALP SERPL-CCNC: 223 U/L (ref 45–117)
ALP SERPL-CCNC: 242 U/L (ref 45–117)
ALT SERPL-CCNC: 186 U/L (ref 12–78)
ALT SERPL-CCNC: 227 U/L (ref 12–78)
ALT SERPL-CCNC: 260 U/L (ref 12–78)
ALT SERPL-CCNC: 382 U/L (ref 12–78)
ANION GAP BLD CALC-SCNC: 20 MMOL/L (ref 5–15)
ANION GAP SERPL CALC-SCNC: 10 MMOL/L (ref 5–15)
ANION GAP SERPL CALC-SCNC: 10 MMOL/L (ref 5–15)
ANION GAP SERPL CALC-SCNC: 11 MMOL/L (ref 5–15)
ANION GAP SERPL CALC-SCNC: 12 MMOL/L (ref 5–15)
ANION GAP SERPL CALC-SCNC: 13 MMOL/L (ref 5–15)
ANION GAP SERPL CALC-SCNC: 14 MMOL/L (ref 5–15)
ANION GAP SERPL CALC-SCNC: 14 MMOL/L (ref 5–15)
ANION GAP SERPL CALC-SCNC: 9 MMOL/L (ref 5–15)
APPEARANCE UR: CLEAR
APTT PPP: 26.3 SEC (ref 22.1–32.5)
ARTERIAL PATENCY WRIST A: YES
AST SERPL-CCNC: 133 U/L (ref 15–37)
AST SERPL-CCNC: 188 U/L (ref 15–37)
AST SERPL-CCNC: 290 U/L (ref 15–37)
AST SERPL-CCNC: 458 U/L (ref 15–37)
ATRIAL RATE: 84 BPM
ATRIAL RATE: 98 BPM
BACTERIA URNS QL MICRO: NEGATIVE /HPF
BASE EXCESS BLD CALC-SCNC: 3 MMOL/L
BASOPHILS # BLD: 0 K/UL (ref 0–0.1)
BASOPHILS NFR BLD: 0 % (ref 0–1)
BDY SITE: ABNORMAL
BILIRUB DIRECT SERPL-MCNC: 0.6 MG/DL (ref 0–0.2)
BILIRUB SERPL-MCNC: 0.6 MG/DL (ref 0.2–1)
BILIRUB SERPL-MCNC: 0.8 MG/DL (ref 0.2–1)
BILIRUB SERPL-MCNC: 0.9 MG/DL (ref 0.2–1)
BILIRUB SERPL-MCNC: 1 MG/DL (ref 0.2–1)
BILIRUB UR QL CFM: NEGATIVE
BNP SERPL-MCNC: 369 PG/ML (ref 0–125)
BUN BLD-MCNC: 10 MG/DL (ref 9–20)
BUN SERPL-MCNC: 11 MG/DL (ref 6–20)
BUN SERPL-MCNC: 14 MG/DL (ref 6–20)
BUN SERPL-MCNC: 16 MG/DL (ref 6–20)
BUN SERPL-MCNC: 19 MG/DL (ref 6–20)
BUN SERPL-MCNC: 22 MG/DL (ref 6–20)
BUN SERPL-MCNC: 23 MG/DL (ref 6–20)
BUN SERPL-MCNC: 23 MG/DL (ref 6–20)
BUN SERPL-MCNC: 46 MG/DL (ref 6–20)
BUN SERPL-MCNC: 55 MG/DL (ref 6–20)
BUN SERPL-MCNC: 65 MG/DL (ref 6–20)
BUN SERPL-MCNC: 9 MG/DL (ref 6–20)
BUN/CREAT SERPL: 10 (ref 12–20)
BUN/CREAT SERPL: 10 (ref 12–20)
BUN/CREAT SERPL: 14 (ref 12–20)
BUN/CREAT SERPL: 15 (ref 12–20)
BUN/CREAT SERPL: 20 (ref 12–20)
BUN/CREAT SERPL: 21 (ref 12–20)
BUN/CREAT SERPL: 23 (ref 12–20)
BUN/CREAT SERPL: 23 (ref 12–20)
BUN/CREAT SERPL: 25 (ref 12–20)
CA-I BLD-MCNC: 1.12 MMOL/L (ref 1.12–1.32)
CALCIUM SERPL-MCNC: 8.8 MG/DL (ref 8.5–10.1)
CALCIUM SERPL-MCNC: 8.8 MG/DL (ref 8.5–10.1)
CALCIUM SERPL-MCNC: 8.9 MG/DL (ref 8.5–10.1)
CALCIUM SERPL-MCNC: 9 MG/DL (ref 8.5–10.1)
CALCIUM SERPL-MCNC: 9.1 MG/DL (ref 8.5–10.1)
CALCIUM SERPL-MCNC: 9.2 MG/DL (ref 8.5–10.1)
CALCIUM SERPL-MCNC: 9.3 MG/DL (ref 8.5–10.1)
CALCIUM SERPL-MCNC: 9.4 MG/DL (ref 8.5–10.1)
CALCIUM SERPL-MCNC: 9.5 MG/DL (ref 8.5–10.1)
CALCIUM SERPL-MCNC: 9.5 MG/DL (ref 8.5–10.1)
CALCIUM SERPL-MCNC: 9.6 MG/DL (ref 8.5–10.1)
CALCULATED P AXIS, ECG09: 57 DEGREES
CALCULATED P AXIS, ECG09: 78 DEGREES
CALCULATED R AXIS, ECG10: 62 DEGREES
CALCULATED R AXIS, ECG10: 76 DEGREES
CALCULATED T AXIS, ECG11: 77 DEGREES
CALCULATED T AXIS, ECG11: 82 DEGREES
CHLORIDE BLD-SCNC: 86 MMOL/L (ref 98–107)
CHLORIDE SERPL-SCNC: 86 MMOL/L (ref 97–108)
CHLORIDE SERPL-SCNC: 88 MMOL/L (ref 97–108)
CHLORIDE SERPL-SCNC: 89 MMOL/L (ref 97–108)
CHLORIDE SERPL-SCNC: 90 MMOL/L (ref 97–108)
CK MB CFR SERPL CALC: 1.3 % (ref 0–2.5)
CK MB SERPL-MCNC: 2.4 NG/ML (ref 5–25)
CK SERPL-CCNC: 187 U/L (ref 26–192)
CO2 BLD-SCNC: 22 MMOL/L (ref 21–32)
CO2 SERPL-SCNC: 20 MMOL/L (ref 21–32)
CO2 SERPL-SCNC: 21 MMOL/L (ref 21–32)
CO2 SERPL-SCNC: 22 MMOL/L (ref 21–32)
CO2 SERPL-SCNC: 23 MMOL/L (ref 21–32)
CO2 SERPL-SCNC: 24 MMOL/L (ref 21–32)
CO2 SERPL-SCNC: 25 MMOL/L (ref 21–32)
CO2 SERPL-SCNC: 25 MMOL/L (ref 21–32)
CO2 SERPL-SCNC: 27 MMOL/L (ref 21–32)
COLOR UR: ABNORMAL
CORTIS AM PEAK SERPL-MCNC: 27.5 UG/DL (ref 4.3–22.4)
CREAT BLD-MCNC: 1 MG/DL (ref 0.6–1.3)
CREAT BLD-MCNC: 1.1 MG/DL (ref 0.6–1.3)
CREAT SERPL-MCNC: 0.92 MG/DL (ref 0.55–1.02)
CREAT SERPL-MCNC: 0.94 MG/DL (ref 0.55–1.02)
CREAT SERPL-MCNC: 0.98 MG/DL (ref 0.55–1.02)
CREAT SERPL-MCNC: 1.01 MG/DL (ref 0.55–1.02)
CREAT SERPL-MCNC: 1.07 MG/DL (ref 0.55–1.02)
CREAT SERPL-MCNC: 1.1 MG/DL (ref 0.55–1.02)
CREAT SERPL-MCNC: 1.14 MG/DL (ref 0.55–1.02)
CREAT SERPL-MCNC: 1.15 MG/DL (ref 0.55–1.02)
CREAT SERPL-MCNC: 1.83 MG/DL (ref 0.55–1.02)
CREAT SERPL-MCNC: 2.4 MG/DL (ref 0.55–1.02)
CREAT SERPL-MCNC: 3.12 MG/DL (ref 0.55–1.02)
DIAGNOSIS, 93000: NORMAL
DIAGNOSIS, 93000: NORMAL
DIFFERENTIAL METHOD BLD: ABNORMAL
EOSINOPHIL # BLD: 0 K/UL (ref 0–0.4)
EOSINOPHIL # BLD: 0.1 K/UL (ref 0–0.4)
EOSINOPHIL NFR BLD: 0 % (ref 0–7)
EOSINOPHIL NFR BLD: 1 % (ref 0–7)
EPITH CASTS URNS QL MICRO: ABNORMAL /LPF
ERYTHROCYTE [DISTWIDTH] IN BLOOD BY AUTOMATED COUNT: 12.8 % (ref 11.5–14.5)
ERYTHROCYTE [DISTWIDTH] IN BLOOD BY AUTOMATED COUNT: 13.1 % (ref 11.5–14.5)
ERYTHROCYTE [DISTWIDTH] IN BLOOD BY AUTOMATED COUNT: 13.4 % (ref 11.5–14.5)
ERYTHROCYTE [DISTWIDTH] IN BLOOD BY AUTOMATED COUNT: 13.6 % (ref 11.5–14.5)
ERYTHROCYTE [DISTWIDTH] IN BLOOD BY AUTOMATED COUNT: 13.7 % (ref 11.5–14.5)
EST. AVERAGE GLUCOSE BLD GHB EST-MCNC: 97 MG/DL
GAS FLOW.O2 O2 DELIVERY SYS: ABNORMAL L/MIN
GAS FLOW.O2 SETTING OXYMISER: 2 L/M
GLOBULIN SER CALC-MCNC: 3.1 G/DL (ref 2–4)
GLOBULIN SER CALC-MCNC: 3.3 G/DL (ref 2–4)
GLOBULIN SER CALC-MCNC: 3.5 G/DL (ref 2–4)
GLOBULIN SER CALC-MCNC: 3.8 G/DL (ref 2–4)
GLUCOSE BLD STRIP.AUTO-MCNC: 100 MG/DL (ref 65–100)
GLUCOSE BLD STRIP.AUTO-MCNC: 101 MG/DL (ref 65–100)
GLUCOSE BLD STRIP.AUTO-MCNC: 106 MG/DL (ref 65–100)
GLUCOSE BLD STRIP.AUTO-MCNC: 111 MG/DL (ref 65–100)
GLUCOSE BLD STRIP.AUTO-MCNC: 111 MG/DL (ref 65–100)
GLUCOSE BLD STRIP.AUTO-MCNC: 115 MG/DL (ref 65–100)
GLUCOSE BLD STRIP.AUTO-MCNC: 116 MG/DL (ref 65–100)
GLUCOSE BLD STRIP.AUTO-MCNC: 118 MG/DL (ref 65–100)
GLUCOSE BLD STRIP.AUTO-MCNC: 119 MG/DL (ref 65–100)
GLUCOSE BLD STRIP.AUTO-MCNC: 119 MG/DL (ref 65–100)
GLUCOSE BLD STRIP.AUTO-MCNC: 121 MG/DL (ref 65–100)
GLUCOSE BLD STRIP.AUTO-MCNC: 134 MG/DL (ref 65–100)
GLUCOSE BLD STRIP.AUTO-MCNC: 135 MG/DL (ref 65–100)
GLUCOSE BLD STRIP.AUTO-MCNC: 145 MG/DL (ref 65–100)
GLUCOSE BLD STRIP.AUTO-MCNC: 146 MG/DL (ref 65–100)
GLUCOSE BLD STRIP.AUTO-MCNC: 172 MG/DL (ref 65–100)
GLUCOSE BLD STRIP.AUTO-MCNC: 182 MG/DL (ref 65–100)
GLUCOSE BLD STRIP.AUTO-MCNC: 192 MG/DL (ref 65–100)
GLUCOSE BLD STRIP.AUTO-MCNC: 208 MG/DL (ref 65–100)
GLUCOSE BLD STRIP.AUTO-MCNC: 83 MG/DL (ref 65–100)
GLUCOSE BLD STRIP.AUTO-MCNC: 88 MG/DL (ref 65–100)
GLUCOSE BLD STRIP.AUTO-MCNC: 90 MG/DL (ref 65–100)
GLUCOSE BLD STRIP.AUTO-MCNC: 93 MG/DL (ref 65–100)
GLUCOSE BLD STRIP.AUTO-MCNC: 99 MG/DL (ref 65–100)
GLUCOSE BLD-MCNC: 174 MG/DL (ref 65–100)
GLUCOSE SERPL-MCNC: 100 MG/DL (ref 65–100)
GLUCOSE SERPL-MCNC: 121 MG/DL (ref 65–100)
GLUCOSE SERPL-MCNC: 122 MG/DL (ref 65–100)
GLUCOSE SERPL-MCNC: 138 MG/DL (ref 65–100)
GLUCOSE SERPL-MCNC: 69 MG/DL (ref 65–100)
GLUCOSE SERPL-MCNC: 70 MG/DL (ref 65–100)
GLUCOSE SERPL-MCNC: 71 MG/DL (ref 65–100)
GLUCOSE SERPL-MCNC: 78 MG/DL (ref 65–100)
GLUCOSE SERPL-MCNC: 84 MG/DL (ref 65–100)
GLUCOSE SERPL-MCNC: 91 MG/DL (ref 65–100)
GLUCOSE SERPL-MCNC: 93 MG/DL (ref 65–100)
GLUCOSE UR STRIP.AUTO-MCNC: NEGATIVE MG/DL
HBA1C MFR BLD: 5 % (ref 4.2–6.3)
HCO3 BLD-SCNC: 28 MMOL/L (ref 22–26)
HCT VFR BLD AUTO: 32.5 % (ref 35–47)
HCT VFR BLD AUTO: 34.3 % (ref 35–47)
HCT VFR BLD AUTO: 34.4 % (ref 35–47)
HCT VFR BLD AUTO: 35.4 % (ref 35–47)
HCT VFR BLD AUTO: 35.8 % (ref 35–47)
HCT VFR BLD AUTO: 35.9 % (ref 35–47)
HCT VFR BLD AUTO: 38.5 % (ref 35–47)
HCT VFR BLD CALC: 35 % (ref 35–47)
HGB BLD-MCNC: 11.1 G/DL (ref 11.5–16)
HGB BLD-MCNC: 11.8 G/DL (ref 11.5–16)
HGB BLD-MCNC: 11.8 G/DL (ref 11.5–16)
HGB BLD-MCNC: 11.9 G/DL (ref 11.5–16)
HGB BLD-MCNC: 11.9 GM/DL (ref 11.5–16)
HGB BLD-MCNC: 12.1 G/DL (ref 11.5–16)
HGB BLD-MCNC: 12.3 G/DL (ref 11.5–16)
HGB BLD-MCNC: 13.1 G/DL (ref 11.5–16)
HGB UR QL STRIP: NEGATIVE
IMM GRANULOCYTES # BLD: 0 K/UL
IMM GRANULOCYTES # BLD: 0 K/UL
IMM GRANULOCYTES # BLD: 0 K/UL (ref 0–0.04)
IMM GRANULOCYTES # BLD: 0.1 K/UL (ref 0–0.04)
IMM GRANULOCYTES NFR BLD AUTO: 0 %
IMM GRANULOCYTES NFR BLD AUTO: 0 %
IMM GRANULOCYTES NFR BLD AUTO: 0 % (ref 0–0.5)
IMM GRANULOCYTES NFR BLD AUTO: 1 % (ref 0–0.5)
INR PPP: 1 (ref 0.9–1.1)
KETONES UR QL STRIP.AUTO: NEGATIVE MG/DL
LACTATE SERPL-SCNC: 4.2 MMOL/L (ref 0.4–2)
LACTATE SERPL-SCNC: 4.2 MMOL/L (ref 0.4–2)
LACTATE SERPL-SCNC: 4.9 MMOL/L (ref 0.4–2)
LACTATE SERPL-SCNC: 6.2 MMOL/L (ref 0.4–2)
LACTATE SERPL-SCNC: 6.9 MMOL/L (ref 0.4–2)
LEUKOCYTE ESTERASE UR QL STRIP.AUTO: NEGATIVE
LYMPHOCYTES # BLD: 0.2 K/UL (ref 0.8–3.5)
LYMPHOCYTES # BLD: 0.7 K/UL (ref 0.8–3.5)
LYMPHOCYTES # BLD: 0.8 K/UL (ref 0.8–3.5)
LYMPHOCYTES # BLD: 0.9 K/UL (ref 0.8–3.5)
LYMPHOCYTES NFR BLD: 1 % (ref 12–49)
LYMPHOCYTES NFR BLD: 5 % (ref 12–49)
LYMPHOCYTES NFR BLD: 5 % (ref 12–49)
LYMPHOCYTES NFR BLD: 7 % (ref 12–49)
MAGNESIUM SERPL-MCNC: 1.6 MG/DL (ref 1.6–2.4)
MAGNESIUM SERPL-MCNC: 1.6 MG/DL (ref 1.6–2.4)
MAGNESIUM SERPL-MCNC: 1.7 MG/DL (ref 1.6–2.4)
MAGNESIUM SERPL-MCNC: 1.7 MG/DL (ref 1.6–2.4)
MAGNESIUM SERPL-MCNC: 2.4 MG/DL (ref 1.6–2.4)
MAGNESIUM SERPL-MCNC: 2.6 MG/DL (ref 1.6–2.4)
MCH RBC QN AUTO: 31.4 PG (ref 26–34)
MCH RBC QN AUTO: 31.7 PG (ref 26–34)
MCH RBC QN AUTO: 31.7 PG (ref 26–34)
MCH RBC QN AUTO: 32 PG (ref 26–34)
MCH RBC QN AUTO: 32.1 PG (ref 26–34)
MCH RBC QN AUTO: 32.3 PG (ref 26–34)
MCH RBC QN AUTO: 32.4 PG (ref 26–34)
MCHC RBC AUTO-ENTMCNC: 33.2 G/DL (ref 30–36.5)
MCHC RBC AUTO-ENTMCNC: 34 G/DL (ref 30–36.5)
MCHC RBC AUTO-ENTMCNC: 34.2 G/DL (ref 30–36.5)
MCHC RBC AUTO-ENTMCNC: 34.2 G/DL (ref 30–36.5)
MCHC RBC AUTO-ENTMCNC: 34.3 G/DL (ref 30–36.5)
MCHC RBC AUTO-ENTMCNC: 34.3 G/DL (ref 30–36.5)
MCHC RBC AUTO-ENTMCNC: 34.4 G/DL (ref 30–36.5)
MCV RBC AUTO: 91.6 FL (ref 80–99)
MCV RBC AUTO: 92.7 FL (ref 80–99)
MCV RBC AUTO: 92.9 FL (ref 80–99)
MCV RBC AUTO: 93.5 FL (ref 80–99)
MCV RBC AUTO: 94.2 FL (ref 80–99)
MCV RBC AUTO: 95.1 FL (ref 80–99)
MCV RBC AUTO: 96.2 FL (ref 80–99)
MONOCYTES # BLD: 1.2 K/UL (ref 0–1)
MONOCYTES # BLD: 1.3 K/UL (ref 0–1)
MONOCYTES # BLD: 1.3 K/UL (ref 0–1)
MONOCYTES # BLD: 1.8 K/UL (ref 0–1)
MONOCYTES NFR BLD: 11 % (ref 5–13)
MONOCYTES NFR BLD: 11 % (ref 5–13)
MONOCYTES NFR BLD: 7 % (ref 5–13)
MONOCYTES NFR BLD: 8 % (ref 5–13)
NEUTS SEG # BLD: 13.4 K/UL (ref 1.8–8)
NEUTS SEG # BLD: 14.3 K/UL (ref 1.8–8)
NEUTS SEG # BLD: 16.3 K/UL (ref 1.8–8)
NEUTS SEG # BLD: 8.9 K/UL (ref 1.8–8)
NEUTS SEG NFR BLD: 81 % (ref 32–75)
NEUTS SEG NFR BLD: 84 % (ref 32–75)
NEUTS SEG NFR BLD: 88 % (ref 32–75)
NEUTS SEG NFR BLD: 91 % (ref 32–75)
NITRITE UR QL STRIP.AUTO: NEGATIVE
NRBC # BLD: 0 K/UL (ref 0–0.01)
NRBC BLD-RTO: 0 PER 100 WBC
OSMOLALITY UR: 614 MOSM/KG H2O
P-R INTERVAL, ECG05: 152 MS
P-R INTERVAL, ECG05: 156 MS
PCO2 BLD: 44 MMHG (ref 35–45)
PH BLD: 7.41 [PH] (ref 7.35–7.45)
PH UR STRIP: 6 [PH] (ref 5–8)
PHOSPHATE SERPL-MCNC: 2.1 MG/DL (ref 2.6–4.7)
PHOSPHATE SERPL-MCNC: 2.9 MG/DL (ref 2.6–4.7)
PHOSPHATE SERPL-MCNC: 6.5 MG/DL (ref 2.6–4.7)
PLATELET # BLD AUTO: 105 K/UL (ref 150–400)
PLATELET # BLD AUTO: 119 K/UL (ref 150–400)
PLATELET # BLD AUTO: 122 K/UL (ref 150–400)
PLATELET # BLD AUTO: 127 K/UL (ref 150–400)
PLATELET # BLD AUTO: 164 K/UL (ref 150–400)
PLATELET # BLD AUTO: 166 K/UL (ref 150–400)
PLATELET # BLD AUTO: 176 K/UL (ref 150–400)
PMV BLD AUTO: 10.3 FL (ref 8.9–12.9)
PMV BLD AUTO: 10.8 FL (ref 8.9–12.9)
PMV BLD AUTO: 11.1 FL (ref 8.9–12.9)
PMV BLD AUTO: 11.7 FL (ref 8.9–12.9)
PO2 BLD: 105 MMHG (ref 80–100)
POTASSIUM BLD-SCNC: 5.3 MMOL/L (ref 3.5–5.1)
POTASSIUM SERPL-SCNC: 4.9 MMOL/L (ref 3.5–5.1)
POTASSIUM SERPL-SCNC: 5.2 MMOL/L (ref 3.5–5.1)
POTASSIUM SERPL-SCNC: 5.2 MMOL/L (ref 3.5–5.1)
POTASSIUM SERPL-SCNC: 5.3 MMOL/L (ref 3.5–5.1)
POTASSIUM SERPL-SCNC: 5.3 MMOL/L (ref 3.5–5.1)
POTASSIUM SERPL-SCNC: 5.4 MMOL/L (ref 3.5–5.1)
POTASSIUM SERPL-SCNC: 5.6 MMOL/L (ref 3.5–5.1)
POTASSIUM SERPL-SCNC: 6.1 MMOL/L (ref 3.5–5.1)
POTASSIUM SERPL-SCNC: 6.3 MMOL/L (ref 3.5–5.1)
POTASSIUM SERPL-SCNC: 6.4 MMOL/L (ref 3.5–5.1)
POTASSIUM SERPL-SCNC: 6.7 MMOL/L (ref 3.5–5.1)
POTASSIUM SERPL-SCNC: 6.7 MMOL/L (ref 3.5–5.1)
PROT SERPL-MCNC: 6.1 G/DL (ref 6.4–8.2)
PROT SERPL-MCNC: 6.3 G/DL (ref 6.4–8.2)
PROT SERPL-MCNC: 6.3 G/DL (ref 6.4–8.2)
PROT SERPL-MCNC: 6.6 G/DL (ref 6.4–8.2)
PROT UR STRIP-MCNC: ABNORMAL MG/DL
PROTHROMBIN TIME: 10.4 SEC (ref 9–11.1)
Q-T INTERVAL, ECG07: 336 MS
Q-T INTERVAL, ECG07: 344 MS
QRS DURATION, ECG06: 74 MS
QRS DURATION, ECG06: 80 MS
QTC CALCULATION (BEZET), ECG08: 406 MS
QTC CALCULATION (BEZET), ECG08: 428 MS
RBC # BLD AUTO: 3.5 M/UL (ref 3.8–5.2)
RBC # BLD AUTO: 3.64 M/UL (ref 3.8–5.2)
RBC # BLD AUTO: 3.68 M/UL (ref 3.8–5.2)
RBC # BLD AUTO: 3.72 M/UL (ref 3.8–5.2)
RBC # BLD AUTO: 3.82 M/UL (ref 3.8–5.2)
RBC # BLD AUTO: 3.92 M/UL (ref 3.8–5.2)
RBC # BLD AUTO: 4.05 M/UL (ref 3.8–5.2)
RBC #/AREA URNS HPF: ABNORMAL /HPF (ref 0–5)
RBC MORPH BLD: ABNORMAL
SAO2 % BLD: 98 % (ref 92–97)
SERVICE CMNT-IMP: ABNORMAL
SERVICE CMNT-IMP: NORMAL
SODIUM BLD-SCNC: 121 MMOL/L (ref 136–145)
SODIUM SERPL-SCNC: 121 MMOL/L (ref 136–145)
SODIUM SERPL-SCNC: 122 MMOL/L (ref 136–145)
SODIUM SERPL-SCNC: 122 MMOL/L (ref 136–145)
SODIUM SERPL-SCNC: 123 MMOL/L (ref 136–145)
SODIUM SERPL-SCNC: 124 MMOL/L (ref 136–145)
SODIUM SERPL-SCNC: 125 MMOL/L (ref 136–145)
SODIUM SERPL-SCNC: 125 MMOL/L (ref 136–145)
SODIUM UR-SCNC: 64 MMOL/L
SP GR UR REFRACTOMETRY: 1.03 (ref 1–1.03)
SPECIMEN TYPE: ABNORMAL
THERAPEUTIC RANGE,PTTT: NORMAL SECS (ref 58–77)
TROPONIN I SERPL-MCNC: <0.04 NG/ML
TSH SERPL DL<=0.05 MIU/L-ACNC: 0.57 UIU/ML (ref 0.36–3.74)
UROBILINOGEN UR QL STRIP.AUTO: 0.2 EU/DL (ref 0.2–1)
VENTRICULAR RATE, ECG03: 84 BPM
VENTRICULAR RATE, ECG03: 98 BPM
WBC # BLD AUTO: 11 K/UL (ref 3.6–11)
WBC # BLD AUTO: 14.9 K/UL (ref 3.6–11)
WBC # BLD AUTO: 15.8 K/UL (ref 3.6–11)
WBC # BLD AUTO: 16 K/UL (ref 3.6–11)
WBC # BLD AUTO: 18.5 K/UL (ref 3.6–11)
WBC # BLD AUTO: 23.4 K/UL (ref 3.6–11)
WBC # BLD AUTO: 24 K/UL (ref 3.6–11)
WBC URNS QL MICRO: ABNORMAL /HPF (ref 0–4)

## 2018-01-01 PROCEDURE — 74011000250 HC RX REV CODE- 250: Performed by: INTERNAL MEDICINE

## 2018-01-01 PROCEDURE — 76450000000

## 2018-01-01 PROCEDURE — 74011250636 HC RX REV CODE- 250/636: Performed by: HOSPITALIST

## 2018-01-01 PROCEDURE — 77010033678 HC OXYGEN DAILY

## 2018-01-01 PROCEDURE — 80053 COMPREHEN METABOLIC PANEL: CPT | Performed by: EMERGENCY MEDICINE

## 2018-01-01 PROCEDURE — 99218 HC RM OBSERVATION: CPT

## 2018-01-01 PROCEDURE — 74011250636 HC RX REV CODE- 250/636: Performed by: FAMILY MEDICINE

## 2018-01-01 PROCEDURE — 74011250637 HC RX REV CODE- 250/637: Performed by: PHYSICAL MEDICINE & REHABILITATION

## 2018-01-01 PROCEDURE — 82565 ASSAY OF CREATININE: CPT

## 2018-01-01 PROCEDURE — 74011000250 HC RX REV CODE- 250: Performed by: FAMILY MEDICINE

## 2018-01-01 PROCEDURE — 84100 ASSAY OF PHOSPHORUS: CPT | Performed by: EMERGENCY MEDICINE

## 2018-01-01 PROCEDURE — 80048 BASIC METABOLIC PNL TOTAL CA: CPT | Performed by: HOSPITALIST

## 2018-01-01 PROCEDURE — 74011250636 HC RX REV CODE- 250/636: Performed by: PHYSICAL MEDICINE & REHABILITATION

## 2018-01-01 PROCEDURE — 76705 ECHO EXAM OF ABDOMEN: CPT

## 2018-01-01 PROCEDURE — 74011636637 HC RX REV CODE- 636/637: Performed by: HOSPITALIST

## 2018-01-01 PROCEDURE — 74011250636 HC RX REV CODE- 250/636: Performed by: RADIOLOGY

## 2018-01-01 PROCEDURE — 83605 ASSAY OF LACTIC ACID: CPT | Performed by: INTERNAL MEDICINE

## 2018-01-01 PROCEDURE — 74011250637 HC RX REV CODE- 250/637: Performed by: HOSPITALIST

## 2018-01-01 PROCEDURE — 36600 WITHDRAWAL OF ARTERIAL BLOOD: CPT

## 2018-01-01 PROCEDURE — 85025 COMPLETE CBC W/AUTO DIFF WBC: CPT | Performed by: EMERGENCY MEDICINE

## 2018-01-01 PROCEDURE — 94762 N-INVAS EAR/PLS OXIMTRY CONT: CPT

## 2018-01-01 PROCEDURE — 36415 COLL VENOUS BLD VENIPUNCTURE: CPT | Performed by: INTERNAL MEDICINE

## 2018-01-01 PROCEDURE — 96365 THER/PROPH/DIAG IV INF INIT: CPT

## 2018-01-01 PROCEDURE — 94664 DEMO&/EVAL PT USE INHALER: CPT

## 2018-01-01 PROCEDURE — 94640 AIRWAY INHALATION TREATMENT: CPT

## 2018-01-01 PROCEDURE — 85025 COMPLETE CBC W/AUTO DIFF WBC: CPT | Performed by: INTERNAL MEDICINE

## 2018-01-01 PROCEDURE — 74011250637 HC RX REV CODE- 250/637: Performed by: INTERNAL MEDICINE

## 2018-01-01 PROCEDURE — 84300 ASSAY OF URINE SODIUM: CPT | Performed by: INTERNAL MEDICINE

## 2018-01-01 PROCEDURE — 74011000250 HC RX REV CODE- 250: Performed by: HOSPITALIST

## 2018-01-01 PROCEDURE — 88342 IMHCHEM/IMCYTCHM 1ST ANTB: CPT | Performed by: INTERNAL MEDICINE

## 2018-01-01 PROCEDURE — 83935 ASSAY OF URINE OSMOLALITY: CPT | Performed by: INTERNAL MEDICINE

## 2018-01-01 PROCEDURE — 84100 ASSAY OF PHOSPHORUS: CPT | Performed by: INTERNAL MEDICINE

## 2018-01-01 PROCEDURE — 74011250637 HC RX REV CODE- 250/637: Performed by: FAMILY MEDICINE

## 2018-01-01 PROCEDURE — 83735 ASSAY OF MAGNESIUM: CPT | Performed by: HOSPITALIST

## 2018-01-01 PROCEDURE — 88172 CYTP DX EVAL FNA 1ST EA SITE: CPT | Performed by: INTERNAL MEDICINE

## 2018-01-01 PROCEDURE — 74011636320 HC RX REV CODE- 636/320: Performed by: EMERGENCY MEDICINE

## 2018-01-01 PROCEDURE — 51798 US URINE CAPACITY MEASURE: CPT

## 2018-01-01 PROCEDURE — 84132 ASSAY OF SERUM POTASSIUM: CPT | Performed by: INTERNAL MEDICINE

## 2018-01-01 PROCEDURE — 88173 CYTOPATH EVAL FNA REPORT: CPT | Performed by: INTERNAL MEDICINE

## 2018-01-01 PROCEDURE — 3336500001 HSPC ELECTION

## 2018-01-01 PROCEDURE — 80048 BASIC METABOLIC PNL TOTAL CA: CPT | Performed by: INTERNAL MEDICINE

## 2018-01-01 PROCEDURE — 83880 ASSAY OF NATRIURETIC PEPTIDE: CPT | Performed by: EMERGENCY MEDICINE

## 2018-01-01 PROCEDURE — 65660000000 HC RM CCU STEPDOWN

## 2018-01-01 PROCEDURE — 96368 THER/DIAG CONCURRENT INF: CPT

## 2018-01-01 PROCEDURE — 88305 TISSUE EXAM BY PATHOLOGIST: CPT | Performed by: INTERNAL MEDICINE

## 2018-01-01 PROCEDURE — 74018 RADEX ABDOMEN 1 VIEW: CPT

## 2018-01-01 PROCEDURE — 83735 ASSAY OF MAGNESIUM: CPT | Performed by: INTERNAL MEDICINE

## 2018-01-01 PROCEDURE — 71046 X-RAY EXAM CHEST 2 VIEWS: CPT

## 2018-01-01 PROCEDURE — 70470 CT HEAD/BRAIN W/O & W/DYE: CPT

## 2018-01-01 PROCEDURE — 74011000258 HC RX REV CODE- 258: Performed by: EMERGENCY MEDICINE

## 2018-01-01 PROCEDURE — 74011636320 HC RX REV CODE- 636/320: Performed by: INTERNAL MEDICINE

## 2018-01-01 PROCEDURE — 82962 GLUCOSE BLOOD TEST: CPT

## 2018-01-01 PROCEDURE — 85025 COMPLETE CBC W/AUTO DIFF WBC: CPT | Performed by: FAMILY MEDICINE

## 2018-01-01 PROCEDURE — 80076 HEPATIC FUNCTION PANEL: CPT | Performed by: HOSPITALIST

## 2018-01-01 PROCEDURE — 82803 BLOOD GASES ANY COMBINATION: CPT

## 2018-01-01 PROCEDURE — 82550 ASSAY OF CK (CPK): CPT | Performed by: EMERGENCY MEDICINE

## 2018-01-01 PROCEDURE — 74177 CT ABD & PELVIS W/CONTRAST: CPT

## 2018-01-01 PROCEDURE — 74011250636 HC RX REV CODE- 250/636

## 2018-01-01 PROCEDURE — 74011000258 HC RX REV CODE- 258: Performed by: INTERNAL MEDICINE

## 2018-01-01 PROCEDURE — 99285 EMERGENCY DEPT VISIT HI MDM: CPT

## 2018-01-01 PROCEDURE — 74011000250 HC RX REV CODE- 250: Performed by: EMERGENCY MEDICINE

## 2018-01-01 PROCEDURE — 85027 COMPLETE CBC AUTOMATED: CPT | Performed by: INTERNAL MEDICINE

## 2018-01-01 PROCEDURE — 74011000258 HC RX REV CODE- 258: Performed by: FAMILY MEDICINE

## 2018-01-01 PROCEDURE — 84484 ASSAY OF TROPONIN QUANT: CPT | Performed by: EMERGENCY MEDICINE

## 2018-01-01 PROCEDURE — 0651 HSPC ROUTINE HOME CARE

## 2018-01-01 PROCEDURE — 36415 COLL VENOUS BLD VENIPUNCTURE: CPT | Performed by: HOSPITALIST

## 2018-01-01 PROCEDURE — 85610 PROTHROMBIN TIME: CPT | Performed by: EMERGENCY MEDICINE

## 2018-01-01 PROCEDURE — 96366 THER/PROPH/DIAG IV INF ADDON: CPT

## 2018-01-01 PROCEDURE — A9552 F18 FDG: HCPCS

## 2018-01-01 PROCEDURE — 74011250637 HC RX REV CODE- 250/637: Performed by: NURSE PRACTITIONER

## 2018-01-01 PROCEDURE — 84443 ASSAY THYROID STIM HORMONE: CPT | Performed by: INTERNAL MEDICINE

## 2018-01-01 PROCEDURE — 83605 ASSAY OF LACTIC ACID: CPT | Performed by: HOSPITALIST

## 2018-01-01 PROCEDURE — 84100 ASSAY OF PHOSPHORUS: CPT | Performed by: HOSPITALIST

## 2018-01-01 PROCEDURE — 71275 CT ANGIOGRAPHY CHEST: CPT

## 2018-01-01 PROCEDURE — 74011636637 HC RX REV CODE- 636/637: Performed by: INTERNAL MEDICINE

## 2018-01-01 PROCEDURE — 74011250636 HC RX REV CODE- 250/636: Performed by: INTERNAL MEDICINE

## 2018-01-01 PROCEDURE — A9270 NON-COVERED ITEM OR SERVICE: HCPCS | Performed by: INTERNAL MEDICINE

## 2018-01-01 PROCEDURE — 81001 URINALYSIS AUTO W/SCOPE: CPT | Performed by: INTERNAL MEDICINE

## 2018-01-01 PROCEDURE — 80048 BASIC METABOLIC PNL TOTAL CA: CPT | Performed by: FAMILY MEDICINE

## 2018-01-01 PROCEDURE — 65270000032 HC RM SEMIPRIVATE

## 2018-01-01 PROCEDURE — 80053 COMPREHEN METABOLIC PANEL: CPT | Performed by: INTERNAL MEDICINE

## 2018-01-01 PROCEDURE — G0299 HHS/HOSPICE OF RN EA 15 MIN: HCPCS

## 2018-01-01 PROCEDURE — 83036 HEMOGLOBIN GLYCOSYLATED A1C: CPT | Performed by: EMERGENCY MEDICINE

## 2018-01-01 PROCEDURE — 85027 COMPLETE CBC AUTOMATED: CPT | Performed by: HOSPITALIST

## 2018-01-01 PROCEDURE — 77012 CT SCAN FOR NEEDLE BIOPSY: CPT

## 2018-01-01 PROCEDURE — 93005 ELECTROCARDIOGRAM TRACING: CPT

## 2018-01-01 PROCEDURE — 83735 ASSAY OF MAGNESIUM: CPT | Performed by: EMERGENCY MEDICINE

## 2018-01-01 PROCEDURE — 74011000250 HC RX REV CODE- 250: Performed by: RADIOLOGY

## 2018-01-01 PROCEDURE — 77030029684 HC NEB SM VOL KT MONA -A

## 2018-01-01 PROCEDURE — 74011250636 HC RX REV CODE- 250/636: Performed by: EMERGENCY MEDICINE

## 2018-01-01 PROCEDURE — 80047 BASIC METABLC PNL IONIZED CA: CPT

## 2018-01-01 PROCEDURE — 85730 THROMBOPLASTIN TIME PARTIAL: CPT | Performed by: EMERGENCY MEDICINE

## 2018-01-01 PROCEDURE — 88341 IMHCHEM/IMCYTCHM EA ADD ANTB: CPT | Performed by: INTERNAL MEDICINE

## 2018-01-01 PROCEDURE — 88333 PATH CONSLTJ SURG CYTO XM 1: CPT | Performed by: INTERNAL MEDICINE

## 2018-01-01 PROCEDURE — 36415 COLL VENOUS BLD VENIPUNCTURE: CPT | Performed by: FAMILY MEDICINE

## 2018-01-01 PROCEDURE — 74011000250 HC RX REV CODE- 250

## 2018-01-01 PROCEDURE — 96375 TX/PRO/DX INJ NEW DRUG ADDON: CPT

## 2018-01-01 PROCEDURE — 36415 COLL VENOUS BLD VENIPUNCTURE: CPT | Performed by: EMERGENCY MEDICINE

## 2018-01-01 PROCEDURE — 82533 TOTAL CORTISOL: CPT | Performed by: INTERNAL MEDICINE

## 2018-01-01 PROCEDURE — 74011250636 HC RX REV CODE- 250/636: Performed by: NURSE PRACTITIONER

## 2018-01-01 RX ORDER — IPRATROPIUM BROMIDE AND ALBUTEROL SULFATE 2.5; .5 MG/3ML; MG/3ML
3 SOLUTION RESPIRATORY (INHALATION)
Status: DISCONTINUED | OUTPATIENT
Start: 2018-01-01 | End: 2018-01-01

## 2018-01-01 RX ORDER — LIDOCAINE HYDROCHLORIDE 10 MG/ML
INJECTION INFILTRATION; PERINEURAL
Status: COMPLETED
Start: 2018-01-01 | End: 2018-01-01

## 2018-01-01 RX ORDER — DEXAMETHASONE SODIUM PHOSPHATE 4 MG/ML
4 INJECTION, SOLUTION INTRA-ARTICULAR; INTRALESIONAL; INTRAMUSCULAR; INTRAVENOUS; SOFT TISSUE EVERY 12 HOURS
Status: DISCONTINUED | OUTPATIENT
Start: 2018-01-01 | End: 2018-01-01 | Stop reason: HOSPADM

## 2018-01-01 RX ORDER — LORAZEPAM 2 MG/ML
1 INJECTION INTRAMUSCULAR
Status: DISCONTINUED | OUTPATIENT
Start: 2018-01-01 | End: 2018-01-01 | Stop reason: HOSPADM

## 2018-01-01 RX ORDER — SODIUM CHLORIDE 9 MG/ML
50 INJECTION, SOLUTION INTRAVENOUS CONTINUOUS
Status: DISCONTINUED | OUTPATIENT
Start: 2018-01-01 | End: 2018-01-01

## 2018-01-01 RX ORDER — ALBUTEROL SULFATE 0.83 MG/ML
2.5 SOLUTION RESPIRATORY (INHALATION)
Qty: 24 EACH | Refills: 0 | Status: SHIPPED | OUTPATIENT
Start: 2018-01-01

## 2018-01-01 RX ORDER — MIDAZOLAM HYDROCHLORIDE 1 MG/ML
INJECTION, SOLUTION INTRAMUSCULAR; INTRAVENOUS
Status: DISPENSED
Start: 2018-01-01 | End: 2018-01-01

## 2018-01-01 RX ORDER — PROCHLORPERAZINE EDISYLATE 5 MG/ML
10 INJECTION INTRAMUSCULAR; INTRAVENOUS
Status: DISCONTINUED | OUTPATIENT
Start: 2018-01-01 | End: 2018-01-01 | Stop reason: SDUPTHER

## 2018-01-01 RX ORDER — SODIUM CHLORIDE 0.9 % (FLUSH) 0.9 %
10 SYRINGE (ML) INJECTION AS NEEDED
Status: DISCONTINUED | OUTPATIENT
Start: 2018-01-01 | End: 2018-01-01 | Stop reason: HOSPADM

## 2018-01-01 RX ORDER — ONDANSETRON 2 MG/ML
4 INJECTION INTRAMUSCULAR; INTRAVENOUS
Status: DISCONTINUED | OUTPATIENT
Start: 2018-01-01 | End: 2018-01-01

## 2018-01-01 RX ORDER — MAGNESIUM SULFATE 100 %
4 CRYSTALS MISCELLANEOUS AS NEEDED
Status: DISCONTINUED | OUTPATIENT
Start: 2018-01-01 | End: 2018-01-01 | Stop reason: HOSPADM

## 2018-01-01 RX ORDER — HALOPERIDOL 5 MG/ML
2 INJECTION INTRAMUSCULAR
Status: DISCONTINUED | OUTPATIENT
Start: 2018-01-01 | End: 2018-01-01 | Stop reason: HOSPADM

## 2018-01-01 RX ORDER — AMOXICILLIN 250 MG
2 CAPSULE ORAL DAILY
Status: DISCONTINUED | OUTPATIENT
Start: 2018-01-01 | End: 2018-01-01

## 2018-01-01 RX ORDER — SODIUM POLYSTYRENE SULFONATE 15 G/60ML
15 SUSPENSION ORAL; RECTAL
Status: COMPLETED | OUTPATIENT
Start: 2018-01-01 | End: 2018-01-01

## 2018-01-01 RX ORDER — FACIAL-BODY WIPES
10 EACH TOPICAL DAILY PRN
Status: DISCONTINUED | OUTPATIENT
Start: 2018-01-01 | End: 2018-02-23 | Stop reason: HOSPADM

## 2018-01-01 RX ORDER — MORPHINE SULFATE 2 MG/ML
0.5 INJECTION, SOLUTION INTRAMUSCULAR; INTRAVENOUS
Status: DISCONTINUED | OUTPATIENT
Start: 2018-01-01 | End: 2018-01-01

## 2018-01-01 RX ORDER — ACETAMINOPHEN 650 MG/1
650 SUPPOSITORY RECTAL
Status: DISCONTINUED | OUTPATIENT
Start: 2018-01-01 | End: 2018-02-23 | Stop reason: HOSPADM

## 2018-01-01 RX ORDER — ALBUTEROL SULFATE 90 UG/1
2 AEROSOL, METERED RESPIRATORY (INHALATION)
Qty: 1 INHALER | Refills: 0 | Status: SHIPPED | OUTPATIENT
Start: 2018-01-01

## 2018-01-01 RX ORDER — LEVOFLOXACIN 250 MG/1
250 TABLET ORAL EVERY 24 HOURS
Status: DISCONTINUED | OUTPATIENT
Start: 2018-01-01 | End: 2018-01-01

## 2018-01-01 RX ORDER — BUDESONIDE 0.5 MG/2ML
500 INHALANT ORAL
Status: DISCONTINUED | OUTPATIENT
Start: 2018-01-01 | End: 2018-01-01 | Stop reason: HOSPADM

## 2018-01-01 RX ORDER — ENOXAPARIN SODIUM 100 MG/ML
40 INJECTION SUBCUTANEOUS EVERY 24 HOURS
Status: DISCONTINUED | OUTPATIENT
Start: 2018-01-01 | End: 2018-01-01

## 2018-01-01 RX ORDER — INSULIN LISPRO 100 [IU]/ML
INJECTION, SOLUTION INTRAVENOUS; SUBCUTANEOUS
Status: DISCONTINUED | OUTPATIENT
Start: 2018-01-01 | End: 2018-01-01

## 2018-01-01 RX ORDER — SODIUM CHLORIDE 0.9 % (FLUSH) 0.9 %
5-10 SYRINGE (ML) INJECTION EVERY 8 HOURS
Status: DISCONTINUED | OUTPATIENT
Start: 2018-01-01 | End: 2018-01-01

## 2018-01-01 RX ORDER — BUDESONIDE AND FORMOTEROL FUMARATE DIHYDRATE 160; 4.5 UG/1; UG/1
2 AEROSOL RESPIRATORY (INHALATION) 2 TIMES DAILY
Qty: 1 INHALER | Refills: 0 | Status: SHIPPED | OUTPATIENT
Start: 2018-01-01

## 2018-01-01 RX ORDER — SODIUM CHLORIDE 0.9 % (FLUSH) 0.9 %
10 SYRINGE (ML) INJECTION
Status: COMPLETED | OUTPATIENT
Start: 2018-01-01 | End: 2018-01-01

## 2018-01-01 RX ORDER — DEXAMETHASONE SODIUM PHOSPHATE 4 MG/ML
2 INJECTION, SOLUTION INTRA-ARTICULAR; INTRALESIONAL; INTRAMUSCULAR; INTRAVENOUS; SOFT TISSUE
Status: DISCONTINUED | OUTPATIENT
Start: 2018-01-01 | End: 2018-01-01 | Stop reason: HOSPADM

## 2018-01-01 RX ORDER — SODIUM CHLORIDE 9 MG/ML
1000 INJECTION, SOLUTION INTRAVENOUS CONTINUOUS
Status: DISCONTINUED | OUTPATIENT
Start: 2018-01-01 | End: 2018-01-01

## 2018-01-01 RX ORDER — SODIUM CHLORIDE 0.9 % (FLUSH) 0.9 %
10 SYRINGE (ML) INJECTION
Status: DISPENSED | OUTPATIENT
Start: 2018-01-01 | End: 2018-01-01

## 2018-01-01 RX ORDER — ALBUTEROL SULFATE 0.83 MG/ML
2.5 SOLUTION RESPIRATORY (INHALATION)
Status: DISCONTINUED | OUTPATIENT
Start: 2018-01-01 | End: 2018-01-01 | Stop reason: HOSPADM

## 2018-01-01 RX ORDER — LORAZEPAM 2 MG/ML
1 INJECTION INTRAMUSCULAR
Status: DISCONTINUED | OUTPATIENT
Start: 2018-01-01 | End: 2018-02-23 | Stop reason: HOSPADM

## 2018-01-01 RX ORDER — LISINOPRIL 20 MG/1
20 TABLET ORAL DAILY
Qty: 30 TAB | Refills: 3 | Status: SHIPPED | OUTPATIENT
Start: 2018-01-01

## 2018-01-01 RX ORDER — OMEPRAZOLE 20 MG/1
20 CAPSULE, DELAYED RELEASE ORAL DAILY
COMMUNITY

## 2018-01-01 RX ORDER — ADHESIVE BANDAGE
30 BANDAGE TOPICAL DAILY PRN
COMMUNITY

## 2018-01-01 RX ORDER — FENTANYL CITRATE 50 UG/ML
200 INJECTION, SOLUTION INTRAMUSCULAR; INTRAVENOUS
Status: DISCONTINUED | OUTPATIENT
Start: 2018-01-01 | End: 2018-01-01 | Stop reason: HOSPADM

## 2018-01-01 RX ORDER — LEVOFLOXACIN 250 MG/1
500 TABLET ORAL EVERY 24 HOURS
Status: DISCONTINUED | OUTPATIENT
Start: 2018-01-01 | End: 2018-01-01 | Stop reason: SDUPTHER

## 2018-01-01 RX ORDER — HALOPERIDOL 2 MG/ML
2 SOLUTION ORAL
Status: DISCONTINUED | OUTPATIENT
Start: 2018-01-01 | End: 2018-01-01 | Stop reason: HOSPADM

## 2018-01-01 RX ORDER — SODIUM POLYSTYRENE SULFONATE 15 G/60ML
30 SUSPENSION ORAL; RECTAL
Status: DISCONTINUED | OUTPATIENT
Start: 2018-01-01 | End: 2018-01-01

## 2018-01-01 RX ORDER — SODIUM CHLORIDE 0.9 % (FLUSH) 0.9 %
5 SYRINGE (ML) INJECTION AS NEEDED
Status: DISCONTINUED | OUTPATIENT
Start: 2018-01-01 | End: 2018-02-23 | Stop reason: HOSPADM

## 2018-01-01 RX ORDER — LISINOPRIL 20 MG/1
20 TABLET ORAL DAILY
Status: DISCONTINUED | OUTPATIENT
Start: 2018-01-01 | End: 2018-01-01

## 2018-01-01 RX ORDER — ONDANSETRON 2 MG/ML
4 INJECTION INTRAMUSCULAR; INTRAVENOUS
Status: DISCONTINUED | OUTPATIENT
Start: 2018-01-01 | End: 2018-01-01 | Stop reason: SDUPTHER

## 2018-01-01 RX ORDER — SODIUM POLYSTYRENE SULFONATE 15 G/60ML
30 SUSPENSION ORAL; RECTAL
Status: COMPLETED | OUTPATIENT
Start: 2018-01-01 | End: 2018-01-01

## 2018-01-01 RX ORDER — HYDROMORPHONE HCL IN 0.9% NACL 15 MG/30ML
PATIENT CONTROLLED ANALGESIA VIAL INTRAVENOUS CONTINUOUS
Status: DISCONTINUED | OUTPATIENT
Start: 2018-01-01 | End: 2018-01-01 | Stop reason: HOSPADM

## 2018-01-01 RX ORDER — GLYCOPYRROLATE 0.2 MG/ML
0.2 INJECTION INTRAMUSCULAR; INTRAVENOUS
Status: DISCONTINUED | OUTPATIENT
Start: 2018-01-01 | End: 2018-02-23 | Stop reason: HOSPADM

## 2018-01-01 RX ORDER — PANTOPRAZOLE SODIUM 40 MG/1
40 TABLET, DELAYED RELEASE ORAL DAILY
Status: DISCONTINUED | OUTPATIENT
Start: 2018-01-01 | End: 2018-01-01

## 2018-01-01 RX ORDER — HYDROMORPHONE HYDROCHLORIDE 1 MG/ML
1 INJECTION, SOLUTION INTRAMUSCULAR; INTRAVENOUS; SUBCUTANEOUS
Status: DISCONTINUED | OUTPATIENT
Start: 2018-01-01 | End: 2018-01-01 | Stop reason: HOSPADM

## 2018-01-01 RX ORDER — PREDNISONE 20 MG/1
40 TABLET ORAL
Status: DISCONTINUED | OUTPATIENT
Start: 2018-01-01 | End: 2018-01-01

## 2018-01-01 RX ORDER — BUDESONIDE 0.5 MG/2ML
500 INHALANT ORAL
Status: DISCONTINUED | OUTPATIENT
Start: 2018-01-01 | End: 2018-01-01 | Stop reason: SDUPTHER

## 2018-01-01 RX ORDER — KETOROLAC TROMETHAMINE 30 MG/ML
30 INJECTION, SOLUTION INTRAMUSCULAR; INTRAVENOUS
Status: DISCONTINUED | OUTPATIENT
Start: 2018-01-01 | End: 2018-02-23 | Stop reason: HOSPADM

## 2018-01-01 RX ORDER — OXYCODONE HYDROCHLORIDE 5 MG/1
5 TABLET ORAL
Status: DISCONTINUED | OUTPATIENT
Start: 2018-01-01 | End: 2018-01-01 | Stop reason: HOSPADM

## 2018-01-01 RX ORDER — ALBUTEROL SULFATE 0.83 MG/ML
2.5 SOLUTION RESPIRATORY (INHALATION)
Status: DISCONTINUED | OUTPATIENT
Start: 2018-01-01 | End: 2018-01-01 | Stop reason: SDUPTHER

## 2018-01-01 RX ORDER — ONDANSETRON 2 MG/ML
4 INJECTION INTRAMUSCULAR; INTRAVENOUS
Status: DISCONTINUED | OUTPATIENT
Start: 2018-01-01 | End: 2018-01-01 | Stop reason: HOSPADM

## 2018-01-01 RX ORDER — SCOLOPAMINE TRANSDERMAL SYSTEM 1 MG/1
1 PATCH, EXTENDED RELEASE TRANSDERMAL
Status: DISCONTINUED | OUTPATIENT
Start: 2018-01-01 | End: 2018-02-23 | Stop reason: HOSPADM

## 2018-01-01 RX ORDER — FENTANYL CITRATE 50 UG/ML
INJECTION, SOLUTION INTRAMUSCULAR; INTRAVENOUS
Status: DISPENSED
Start: 2018-01-01 | End: 2018-01-01

## 2018-01-01 RX ORDER — LIDOCAINE HYDROCHLORIDE 10 MG/ML
10 INJECTION INFILTRATION; PERINEURAL
Status: ACTIVE | OUTPATIENT
Start: 2018-01-01 | End: 2018-01-01

## 2018-01-01 RX ORDER — FUROSEMIDE 20 MG/1
20 TABLET ORAL
COMMUNITY

## 2018-01-01 RX ORDER — DEXTROSE 50 % IN WATER (D50W) INTRAVENOUS SYRINGE
12.5-25 AS NEEDED
Status: DISCONTINUED | OUTPATIENT
Start: 2018-01-01 | End: 2018-01-01

## 2018-01-01 RX ORDER — SODIUM CHLORIDE 9 MG/ML
500 INJECTION, SOLUTION INTRAVENOUS CONTINUOUS
Status: DISCONTINUED | OUTPATIENT
Start: 2018-01-01 | End: 2018-01-01 | Stop reason: HOSPADM

## 2018-01-01 RX ORDER — ALBUTEROL SULFATE 90 UG/1
2 AEROSOL, METERED RESPIRATORY (INHALATION)
Status: DISCONTINUED | OUTPATIENT
Start: 2018-01-01 | End: 2018-01-01 | Stop reason: CLARIF

## 2018-01-01 RX ORDER — SENNOSIDES 8.6 MG/1
2 TABLET ORAL DAILY
COMMUNITY

## 2018-01-01 RX ORDER — LEVOFLOXACIN 250 MG/1
500 TABLET ORAL ONCE
Status: DISCONTINUED | OUTPATIENT
Start: 2018-01-01 | End: 2018-01-01

## 2018-01-01 RX ORDER — SODIUM CHLORIDE 0.9 % (FLUSH) 0.9 %
5-10 SYRINGE (ML) INJECTION AS NEEDED
Status: DISCONTINUED | OUTPATIENT
Start: 2018-01-01 | End: 2018-01-01 | Stop reason: HOSPADM

## 2018-01-01 RX ORDER — GUAIFENESIN 100 MG/5ML
81 LIQUID (ML) ORAL DAILY
Status: DISCONTINUED | OUTPATIENT
Start: 2018-01-01 | End: 2018-01-01

## 2018-01-01 RX ORDER — SCOLOPAMINE TRANSDERMAL SYSTEM 1 MG/1
1 PATCH, EXTENDED RELEASE TRANSDERMAL
Status: DISCONTINUED | OUTPATIENT
Start: 2018-01-01 | End: 2018-01-01

## 2018-01-01 RX ORDER — LEVOFLOXACIN 250 MG/1
500 TABLET ORAL ONCE
Status: COMPLETED | OUTPATIENT
Start: 2018-01-01 | End: 2018-01-01

## 2018-01-01 RX ORDER — GLYCOPYRROLATE 0.2 MG/ML
0.2 INJECTION INTRAMUSCULAR; INTRAVENOUS
Status: DISCONTINUED | OUTPATIENT
Start: 2018-01-01 | End: 2018-01-01 | Stop reason: HOSPADM

## 2018-01-01 RX ORDER — DEXTROSE 50 % IN WATER (D50W) INTRAVENOUS SYRINGE
25
Status: COMPLETED | OUTPATIENT
Start: 2018-01-01 | End: 2018-01-01

## 2018-01-01 RX ORDER — HYDROMORPHONE HCL IN 0.9% NACL 15 MG/30ML
PATIENT CONTROLLED ANALGESIA VIAL INTRAVENOUS CONTINUOUS
Status: DISCONTINUED | OUTPATIENT
Start: 2018-01-01 | End: 2018-02-23 | Stop reason: HOSPADM

## 2018-01-01 RX ORDER — SODIUM POLYSTYRENE SULFONATE 15 G/60ML
15 SUSPENSION ORAL; RECTAL ONCE
Status: COMPLETED | OUTPATIENT
Start: 2018-01-01 | End: 2018-01-01

## 2018-01-01 RX ORDER — PREDNISONE 20 MG/1
60 TABLET ORAL
Status: DISCONTINUED | OUTPATIENT
Start: 2018-01-01 | End: 2018-01-01

## 2018-01-01 RX ORDER — MORPHINE SULFATE 2 MG/ML
2 INJECTION, SOLUTION INTRAMUSCULAR; INTRAVENOUS
Status: DISCONTINUED | OUTPATIENT
Start: 2018-01-01 | End: 2018-01-01 | Stop reason: ALTCHOICE

## 2018-01-01 RX ORDER — HYDROMORPHONE HYDROCHLORIDE 1 MG/ML
0.5 INJECTION, SOLUTION INTRAMUSCULAR; INTRAVENOUS; SUBCUTANEOUS
Status: DISCONTINUED | OUTPATIENT
Start: 2018-01-01 | End: 2018-02-23 | Stop reason: HOSPADM

## 2018-01-01 RX ORDER — BUDESONIDE 0.5 MG/2ML
500 INHALANT ORAL
Status: DISCONTINUED | OUTPATIENT
Start: 2018-01-01 | End: 2018-01-01

## 2018-01-01 RX ORDER — MIDAZOLAM HYDROCHLORIDE 1 MG/ML
5 INJECTION, SOLUTION INTRAMUSCULAR; INTRAVENOUS
Status: DISCONTINUED | OUTPATIENT
Start: 2018-01-01 | End: 2018-01-01 | Stop reason: HOSPADM

## 2018-01-01 RX ORDER — METOPROLOL SUCCINATE 50 MG/1
50 TABLET, EXTENDED RELEASE ORAL DAILY
Status: DISCONTINUED | OUTPATIENT
Start: 2018-01-01 | End: 2018-01-01

## 2018-01-01 RX ORDER — ARFORMOTEROL TARTRATE 15 UG/2ML
15 SOLUTION RESPIRATORY (INHALATION)
Status: DISCONTINUED | OUTPATIENT
Start: 2018-01-01 | End: 2018-01-01

## 2018-01-01 RX ORDER — HYDROMORPHONE HYDROCHLORIDE 1 MG/ML
1 INJECTION, SOLUTION INTRAMUSCULAR; INTRAVENOUS; SUBCUTANEOUS
Status: DISCONTINUED | OUTPATIENT
Start: 2018-01-01 | End: 2018-01-01

## 2018-01-01 RX ADMIN — BUDESONIDE 500 MCG: 0.5 INHALANT RESPIRATORY (INHALATION) at 08:56

## 2018-01-01 RX ADMIN — Medication 10 ML: at 21:54

## 2018-01-01 RX ADMIN — LISINOPRIL 20 MG: 20 TABLET ORAL at 08:27

## 2018-01-01 RX ADMIN — IPRATROPIUM BROMIDE AND ALBUTEROL SULFATE 3 ML: .5; 3 SOLUTION RESPIRATORY (INHALATION) at 08:03

## 2018-01-01 RX ADMIN — ENOXAPARIN SODIUM 40 MG: 40 INJECTION SUBCUTANEOUS at 15:40

## 2018-01-01 RX ADMIN — IPRATROPIUM BROMIDE AND ALBUTEROL SULFATE 3 ML: .5; 3 SOLUTION RESPIRATORY (INHALATION) at 16:22

## 2018-01-01 RX ADMIN — Medication 10 ML: at 11:50

## 2018-01-01 RX ADMIN — Medication: at 19:57

## 2018-01-01 RX ADMIN — HYDROMORPHONE HYDROCHLORIDE 1 MG: 1 INJECTION, SOLUTION INTRAMUSCULAR; INTRAVENOUS; SUBCUTANEOUS at 09:08

## 2018-01-01 RX ADMIN — METOPROLOL SUCCINATE 50 MG: 50 TABLET, EXTENDED RELEASE ORAL at 08:27

## 2018-01-01 RX ADMIN — MORPHINE SULFATE 0.5 MG: 2 INJECTION, SOLUTION INTRAMUSCULAR; INTRAVENOUS at 12:58

## 2018-01-01 RX ADMIN — METHYLPREDNISOLONE SODIUM SUCCINATE 40 MG: 40 INJECTION, POWDER, FOR SOLUTION INTRAMUSCULAR; INTRAVENOUS at 15:53

## 2018-01-01 RX ADMIN — BUDESONIDE 500 MCG: 0.5 INHALANT RESPIRATORY (INHALATION) at 20:29

## 2018-01-01 RX ADMIN — PREDNISONE 60 MG: 20 TABLET ORAL at 08:36

## 2018-01-01 RX ADMIN — LEVOFLOXACIN 250 MG: 250 TABLET, FILM COATED ORAL at 10:51

## 2018-01-01 RX ADMIN — IPRATROPIUM BROMIDE AND ALBUTEROL SULFATE 3 ML: .5; 3 SOLUTION RESPIRATORY (INHALATION) at 14:31

## 2018-01-01 RX ADMIN — IPRATROPIUM BROMIDE AND ALBUTEROL SULFATE 3 ML: .5; 3 SOLUTION RESPIRATORY (INHALATION) at 03:52

## 2018-01-01 RX ADMIN — Medication 10 ML: at 14:48

## 2018-01-01 RX ADMIN — IPRATROPIUM BROMIDE AND ALBUTEROL SULFATE 3 ML: .5; 3 SOLUTION RESPIRATORY (INHALATION) at 19:14

## 2018-01-01 RX ADMIN — METOPROLOL SUCCINATE 50 MG: 50 TABLET, EXTENDED RELEASE ORAL at 10:06

## 2018-01-01 RX ADMIN — ARFORMOTEROL TARTRATE 15 MCG: 15 SOLUTION RESPIRATORY (INHALATION) at 10:06

## 2018-01-01 RX ADMIN — METHYLPREDNISOLONE SODIUM SUCCINATE 125 MG: 125 INJECTION, POWDER, FOR SOLUTION INTRAMUSCULAR; INTRAVENOUS at 03:51

## 2018-01-01 RX ADMIN — LEVOFLOXACIN 500 MG: 250 TABLET, FILM COATED ORAL at 11:28

## 2018-01-01 RX ADMIN — ONDANSETRON 4 MG: 2 INJECTION INTRAMUSCULAR; INTRAVENOUS at 14:48

## 2018-01-01 RX ADMIN — PANTOPRAZOLE SODIUM 40 MG: 40 TABLET, DELAYED RELEASE ORAL at 10:06

## 2018-01-01 RX ADMIN — MIDAZOLAM HYDROCHLORIDE 1 MG: 1 INJECTION, SOLUTION INTRAMUSCULAR; INTRAVENOUS at 09:58

## 2018-01-01 RX ADMIN — HYDROMORPHONE HYDROCHLORIDE 1 MG: 1 INJECTION, SOLUTION INTRAMUSCULAR; INTRAVENOUS; SUBCUTANEOUS at 14:13

## 2018-01-01 RX ADMIN — HYDROMORPHONE HYDROCHLORIDE 1 MG: 1 INJECTION, SOLUTION INTRAMUSCULAR; INTRAVENOUS; SUBCUTANEOUS at 10:28

## 2018-01-01 RX ADMIN — ASPIRIN 81 MG 81 MG: 81 TABLET ORAL at 08:36

## 2018-01-01 RX ADMIN — ENOXAPARIN SODIUM 40 MG: 40 INJECTION SUBCUTANEOUS at 15:22

## 2018-01-01 RX ADMIN — IPRATROPIUM BROMIDE AND ALBUTEROL SULFATE 3 ML: .5; 3 SOLUTION RESPIRATORY (INHALATION) at 20:06

## 2018-01-01 RX ADMIN — BUDESONIDE 500 MCG: 0.5 INHALANT RESPIRATORY (INHALATION) at 07:56

## 2018-01-01 RX ADMIN — MORPHINE SULFATE 0.5 MG: 2 INJECTION, SOLUTION INTRAMUSCULAR; INTRAVENOUS at 04:25

## 2018-01-01 RX ADMIN — PANTOPRAZOLE SODIUM 40 MG: 40 TABLET, DELAYED RELEASE ORAL at 08:51

## 2018-01-01 RX ADMIN — IPRATROPIUM BROMIDE AND ALBUTEROL SULFATE 3 ML: .5; 3 SOLUTION RESPIRATORY (INHALATION) at 01:25

## 2018-01-01 RX ADMIN — Medication 10 ML: at 22:16

## 2018-01-01 RX ADMIN — SODIUM CHLORIDE 500 ML: 900 INJECTION, SOLUTION INTRAVENOUS at 07:45

## 2018-01-01 RX ADMIN — CEFTRIAXONE 1 G: 1 INJECTION, POWDER, FOR SOLUTION INTRAMUSCULAR; INTRAVENOUS at 09:48

## 2018-01-01 RX ADMIN — PANTOPRAZOLE SODIUM 40 MG: 40 TABLET, DELAYED RELEASE ORAL at 08:27

## 2018-01-01 RX ADMIN — Medication 10 ML: at 04:25

## 2018-01-01 RX ADMIN — FENTANYL CITRATE 50 MCG: 50 INJECTION, SOLUTION INTRAMUSCULAR; INTRAVENOUS at 09:41

## 2018-01-01 RX ADMIN — IPRATROPIUM BROMIDE AND ALBUTEROL SULFATE 3 ML: .5; 3 SOLUTION RESPIRATORY (INHALATION) at 23:59

## 2018-01-01 RX ADMIN — PANTOPRAZOLE SODIUM 40 MG: 40 TABLET, DELAYED RELEASE ORAL at 08:21

## 2018-01-01 RX ADMIN — IPRATROPIUM BROMIDE AND ALBUTEROL SULFATE 3 ML: .5; 3 SOLUTION RESPIRATORY (INHALATION) at 01:36

## 2018-01-01 RX ADMIN — Medication 10 ML: at 13:19

## 2018-01-01 RX ADMIN — PREDNISONE 60 MG: 20 TABLET ORAL at 08:51

## 2018-01-01 RX ADMIN — BUDESONIDE 500 MCG: 0.5 INHALANT RESPIRATORY (INHALATION) at 20:06

## 2018-01-01 RX ADMIN — SODIUM CHLORIDE 100 ML: 900 INJECTION, SOLUTION INTRAVENOUS at 05:22

## 2018-01-01 RX ADMIN — FENTANYL CITRATE 25 MCG: 50 INJECTION, SOLUTION INTRAMUSCULAR; INTRAVENOUS at 09:55

## 2018-01-01 RX ADMIN — Medication 10 ML: at 22:00

## 2018-01-01 RX ADMIN — ALBUTEROL SULFATE 2.5 MG: 2.5 SOLUTION RESPIRATORY (INHALATION) at 04:08

## 2018-01-01 RX ADMIN — IPRATROPIUM BROMIDE AND ALBUTEROL SULFATE 3 ML: .5; 3 SOLUTION RESPIRATORY (INHALATION) at 14:23

## 2018-01-01 RX ADMIN — IPRATROPIUM BROMIDE AND ALBUTEROL SULFATE 3 ML: .5; 3 SOLUTION RESPIRATORY (INHALATION) at 14:15

## 2018-01-01 RX ADMIN — BUDESONIDE 500 MCG: 0.5 INHALANT RESPIRATORY (INHALATION) at 20:09

## 2018-01-01 RX ADMIN — IPRATROPIUM BROMIDE AND ALBUTEROL SULFATE 3 ML: .5; 3 SOLUTION RESPIRATORY (INHALATION) at 13:04

## 2018-01-01 RX ADMIN — BUDESONIDE 500 MCG: 0.5 INHALANT RESPIRATORY (INHALATION) at 19:14

## 2018-01-01 RX ADMIN — OXYCODONE HYDROCHLORIDE 5 MG: 5 TABLET ORAL at 20:54

## 2018-01-01 RX ADMIN — SODIUM POLYSTYRENE SULFONATE 15 G: 15 SUSPENSION ORAL; RECTAL at 07:48

## 2018-01-01 RX ADMIN — MIDAZOLAM HYDROCHLORIDE 0.5 MG: 1 INJECTION, SOLUTION INTRAMUSCULAR; INTRAVENOUS at 10:09

## 2018-01-01 RX ADMIN — METOPROLOL SUCCINATE 50 MG: 50 TABLET, EXTENDED RELEASE ORAL at 08:49

## 2018-01-01 RX ADMIN — ONDANSETRON 4 MG: 2 INJECTION INTRAMUSCULAR; INTRAVENOUS at 10:32

## 2018-01-01 RX ADMIN — LISINOPRIL 20 MG: 20 TABLET ORAL at 10:15

## 2018-01-01 RX ADMIN — LEVOFLOXACIN 250 MG: 250 TABLET, FILM COATED ORAL at 14:18

## 2018-01-01 RX ADMIN — OXYCODONE HYDROCHLORIDE 5 MG: 5 TABLET ORAL at 15:10

## 2018-01-01 RX ADMIN — IPRATROPIUM BROMIDE AND ALBUTEROL SULFATE 3 ML: .5; 3 SOLUTION RESPIRATORY (INHALATION) at 20:09

## 2018-01-01 RX ADMIN — CALCIUM GLUCONATE 1 G: 98 INJECTION, SOLUTION INTRAVENOUS at 18:07

## 2018-01-01 RX ADMIN — SODIUM CHLORIDE 1000 ML/HR: 900 INJECTION, SOLUTION INTRAVENOUS at 13:19

## 2018-01-01 RX ADMIN — ASPIRIN 81 MG 81 MG: 81 TABLET ORAL at 10:15

## 2018-01-01 RX ADMIN — HYDROMORPHONE HYDROCHLORIDE 1 MG: 1 INJECTION, SOLUTION INTRAMUSCULAR; INTRAVENOUS; SUBCUTANEOUS at 05:28

## 2018-01-01 RX ADMIN — IOPAMIDOL 100 ML: 755 INJECTION, SOLUTION INTRAVENOUS at 20:02

## 2018-01-01 RX ADMIN — IPRATROPIUM BROMIDE AND ALBUTEROL SULFATE 3 ML: .5; 3 SOLUTION RESPIRATORY (INHALATION) at 08:44

## 2018-01-01 RX ADMIN — ASPIRIN 81 MG 81 MG: 81 TABLET ORAL at 08:21

## 2018-01-01 RX ADMIN — DOCUSATE SODIUM AND SENNOSIDES 2 TABLET: 8.6; 5 TABLET, FILM COATED ORAL at 08:21

## 2018-01-01 RX ADMIN — BUDESONIDE 500 MCG: 0.5 INHALANT RESPIRATORY (INHALATION) at 10:06

## 2018-01-01 RX ADMIN — IPRATROPIUM BROMIDE AND ALBUTEROL SULFATE 3 ML: .5; 3 SOLUTION RESPIRATORY (INHALATION) at 08:56

## 2018-01-01 RX ADMIN — Medication 10 ML: at 14:00

## 2018-01-01 RX ADMIN — BUDESONIDE 500 MCG: 0.5 INHALANT RESPIRATORY (INHALATION) at 08:44

## 2018-01-01 RX ADMIN — IOPAMIDOL 70 ML: 755 INJECTION, SOLUTION INTRAVENOUS at 05:22

## 2018-01-01 RX ADMIN — SODIUM CHLORIDE 75 ML/HR: 900 INJECTION, SOLUTION INTRAVENOUS at 15:09

## 2018-01-01 RX ADMIN — Medication 10 ML: at 06:00

## 2018-01-01 RX ADMIN — IOPAMIDOL 100 ML: 612 INJECTION, SOLUTION INTRAVENOUS at 09:31

## 2018-01-01 RX ADMIN — Medication 10 ML: at 20:02

## 2018-01-01 RX ADMIN — IPRATROPIUM BROMIDE AND ALBUTEROL SULFATE 3 ML: .5; 3 SOLUTION RESPIRATORY (INHALATION) at 09:01

## 2018-01-01 RX ADMIN — SODIUM BICARBONATE 1 ML: 0.2 INJECTION, SOLUTION INTRAVENOUS at 09:31

## 2018-01-01 RX ADMIN — ENOXAPARIN SODIUM 40 MG: 40 INJECTION SUBCUTANEOUS at 15:11

## 2018-01-01 RX ADMIN — BUDESONIDE 500 MCG: 0.5 INHALANT RESPIRATORY (INHALATION) at 21:01

## 2018-01-01 RX ADMIN — MORPHINE SULFATE 0.5 MG: 2 INJECTION, SOLUTION INTRAMUSCULAR; INTRAVENOUS at 21:03

## 2018-01-01 RX ADMIN — LEVOFLOXACIN 250 MG: 250 TABLET, FILM COATED ORAL at 11:51

## 2018-01-01 RX ADMIN — ONDANSETRON 4 MG: 2 INJECTION INTRAMUSCULAR; INTRAVENOUS at 15:27

## 2018-01-01 RX ADMIN — ASPIRIN 81 MG 81 MG: 81 TABLET ORAL at 10:06

## 2018-01-01 RX ADMIN — HYDROMORPHONE HYDROCHLORIDE 1 MG: 1 INJECTION, SOLUTION INTRAMUSCULAR; INTRAVENOUS; SUBCUTANEOUS at 23:43

## 2018-01-01 RX ADMIN — IPRATROPIUM BROMIDE AND ALBUTEROL SULFATE 3 ML: .5; 3 SOLUTION RESPIRATORY (INHALATION) at 20:29

## 2018-01-01 RX ADMIN — PREDNISONE 40 MG: 20 TABLET ORAL at 08:22

## 2018-01-01 RX ADMIN — BUDESONIDE 500 MCG: 0.5 INHALANT RESPIRATORY (INHALATION) at 20:26

## 2018-01-01 RX ADMIN — IPRATROPIUM BROMIDE AND ALBUTEROL SULFATE 3 ML: .5; 3 SOLUTION RESPIRATORY (INHALATION) at 01:11

## 2018-01-01 RX ADMIN — ONDANSETRON 4 MG: 2 INJECTION INTRAMUSCULAR; INTRAVENOUS at 23:57

## 2018-01-01 RX ADMIN — OXYCODONE HYDROCHLORIDE 5 MG: 5 TABLET ORAL at 04:46

## 2018-01-01 RX ADMIN — Medication 10 ML: at 15:27

## 2018-01-01 RX ADMIN — DOCUSATE SODIUM AND SENNOSIDES 2 TABLET: 8.6; 5 TABLET, FILM COATED ORAL at 17:07

## 2018-01-01 RX ADMIN — Medication 10 ML: at 05:22

## 2018-01-01 RX ADMIN — Medication 10 ML: at 10:15

## 2018-01-01 RX ADMIN — FENTANYL CITRATE 25 MCG: 50 INJECTION, SOLUTION INTRAMUSCULAR; INTRAVENOUS at 10:09

## 2018-01-01 RX ADMIN — METHYLPREDNISOLONE SODIUM SUCCINATE 40 MG: 40 INJECTION, POWDER, FOR SOLUTION INTRAMUSCULAR; INTRAVENOUS at 05:07

## 2018-01-01 RX ADMIN — SODIUM CHLORIDE 5 MG: 9 INJECTION INTRAMUSCULAR; INTRAVENOUS; SUBCUTANEOUS at 02:59

## 2018-01-01 RX ADMIN — IPRATROPIUM BROMIDE AND ALBUTEROL SULFATE 3 ML: .5; 3 SOLUTION RESPIRATORY (INHALATION) at 20:26

## 2018-01-01 RX ADMIN — Medication 10 ML: at 23:42

## 2018-01-01 RX ADMIN — Medication 10 ML: at 16:00

## 2018-01-01 RX ADMIN — SODIUM CHLORIDE 5 MG: 9 INJECTION INTRAMUSCULAR; INTRAVENOUS; SUBCUTANEOUS at 05:27

## 2018-01-01 RX ADMIN — SODIUM CHLORIDE 50 ML/HR: 900 INJECTION, SOLUTION INTRAVENOUS at 08:57

## 2018-01-01 RX ADMIN — OXYCODONE HYDROCHLORIDE 5 MG: 5 TABLET ORAL at 12:58

## 2018-01-01 RX ADMIN — IPRATROPIUM BROMIDE AND ALBUTEROL SULFATE 3 ML: .5; 3 SOLUTION RESPIRATORY (INHALATION) at 07:56

## 2018-01-01 RX ADMIN — SODIUM CHLORIDE 75 ML/HR: 900 INJECTION, SOLUTION INTRAVENOUS at 08:23

## 2018-01-01 RX ADMIN — SODIUM CHLORIDE 500 ML: 900 INJECTION, SOLUTION INTRAVENOUS at 09:33

## 2018-01-01 RX ADMIN — SODIUM POLYSTYRENE SULFONATE 15 G: 15 SUSPENSION ORAL; RECTAL at 08:36

## 2018-01-01 RX ADMIN — METOPROLOL SUCCINATE 50 MG: 50 TABLET, EXTENDED RELEASE ORAL at 08:21

## 2018-01-01 RX ADMIN — PANTOPRAZOLE SODIUM 40 MG: 40 TABLET, DELAYED RELEASE ORAL at 12:54

## 2018-01-01 RX ADMIN — Medication: at 17:04

## 2018-01-01 RX ADMIN — MORPHINE SULFATE 0.5 MG: 2 INJECTION, SOLUTION INTRAMUSCULAR; INTRAVENOUS at 06:44

## 2018-01-01 RX ADMIN — ENOXAPARIN SODIUM 40 MG: 40 INJECTION SUBCUTANEOUS at 16:00

## 2018-01-01 RX ADMIN — Medication: at 15:06

## 2018-01-01 RX ADMIN — SODIUM POLYSTYRENE SULFONATE 30 G: 15 SUSPENSION ORAL; RECTAL at 17:16

## 2018-01-01 RX ADMIN — Medication 10 ML: at 05:07

## 2018-01-01 RX ADMIN — BUDESONIDE 500 MCG: 0.5 INHALANT RESPIRATORY (INHALATION) at 08:45

## 2018-01-01 RX ADMIN — BUDESONIDE 500 MCG: 0.5 INHALANT RESPIRATORY (INHALATION) at 08:03

## 2018-01-01 RX ADMIN — ASPIRIN 81 MG 81 MG: 81 TABLET ORAL at 08:27

## 2018-01-01 RX ADMIN — ONDANSETRON 4 MG: 2 INJECTION INTRAMUSCULAR; INTRAVENOUS at 18:55

## 2018-01-01 RX ADMIN — METOPROLOL SUCCINATE 50 MG: 50 TABLET, EXTENDED RELEASE ORAL at 08:36

## 2018-01-01 RX ADMIN — AZITHROMYCIN MONOHYDRATE 500 MG: 500 INJECTION, POWDER, LYOPHILIZED, FOR SOLUTION INTRAVENOUS at 08:28

## 2018-01-01 RX ADMIN — DEXTROSE MONOHYDRATE 25 G: 500 INJECTION PARENTERAL at 17:23

## 2018-01-01 RX ADMIN — ALBUTEROL SULFATE 1 DOSE: 2.5 SOLUTION RESPIRATORY (INHALATION) at 03:49

## 2018-01-01 RX ADMIN — IPRATROPIUM BROMIDE AND ALBUTEROL SULFATE 3 ML: .5; 3 SOLUTION RESPIRATORY (INHALATION) at 01:50

## 2018-01-01 RX ADMIN — PREDNISONE 60 MG: 20 TABLET ORAL at 10:06

## 2018-01-01 RX ADMIN — IPRATROPIUM BROMIDE AND ALBUTEROL SULFATE 3 ML: .5; 3 SOLUTION RESPIRATORY (INHALATION) at 08:45

## 2018-01-01 RX ADMIN — AZITHROMYCIN MONOHYDRATE 500 MG: 500 INJECTION, POWDER, LYOPHILIZED, FOR SOLUTION INTRAVENOUS at 08:56

## 2018-01-01 RX ADMIN — PANTOPRAZOLE SODIUM 40 MG: 40 TABLET, DELAYED RELEASE ORAL at 08:36

## 2018-01-01 RX ADMIN — METOPROLOL SUCCINATE 50 MG: 50 TABLET, EXTENDED RELEASE ORAL at 10:15

## 2018-01-01 RX ADMIN — IPRATROPIUM BROMIDE AND ALBUTEROL SULFATE 3 ML: .5; 3 SOLUTION RESPIRATORY (INHALATION) at 12:11

## 2018-01-01 RX ADMIN — ONDANSETRON 4 MG: 2 INJECTION INTRAMUSCULAR; INTRAVENOUS at 04:25

## 2018-01-01 RX ADMIN — BUDESONIDE 500 MCG: 0.5 INHALANT RESPIRATORY (INHALATION) at 09:01

## 2018-01-01 RX ADMIN — LIDOCAINE HYDROCHLORIDE 10 ML: 10 INJECTION, SOLUTION INFILTRATION; PERINEURAL at 09:32

## 2018-01-01 RX ADMIN — Medication 10 ML: at 06:58

## 2018-01-01 RX ADMIN — MIDAZOLAM HYDROCHLORIDE 1.5 MG: 1 INJECTION, SOLUTION INTRAMUSCULAR; INTRAVENOUS at 09:42

## 2018-01-01 RX ADMIN — IPRATROPIUM BROMIDE AND ALBUTEROL SULFATE 3 ML: .5; 3 SOLUTION RESPIRATORY (INHALATION) at 04:49

## 2018-01-01 RX ADMIN — ASPIRIN 81 MG 81 MG: 81 TABLET ORAL at 08:51

## 2018-01-01 RX ADMIN — CEFTRIAXONE 1 G: 1 INJECTION, POWDER, FOR SOLUTION INTRAMUSCULAR; INTRAVENOUS at 11:59

## 2018-01-01 RX ADMIN — IPRATROPIUM BROMIDE AND ALBUTEROL SULFATE 3 ML: .5; 3 SOLUTION RESPIRATORY (INHALATION) at 21:01

## 2018-01-01 RX ADMIN — OXYCODONE HYDROCHLORIDE 5 MG: 5 TABLET ORAL at 08:36

## 2018-01-18 NOTE — TELEPHONE ENCOUNTER
Patient would like refills of all inhalers-does not have appointment with PCP-she cancelled and has not rescheduled. I advised she needs to reschedule today, but told her I would ask you before ordering refills. Please advise, thank you. Requested Prescriptions     Signed Prescriptions Disp Refills    lisinopril (PRINIVIL, ZESTRIL) 20 mg tablet 30 Tab 3     Sig: Take 1 Tab by mouth daily. Authorizing Provider: Jennifer Malloy     Ordering User: Shellie Oconnell    tiotropium (SPIRIVA WITH HANDIHALER) 18 mcg inhalation capsule 30 Cap 0     Sig: Take 1 Cap by inhalation every evening. Authorizing Provider: Jennifer Malloy     Ordering User: Talita Blum budesonide-formoterol (SYMBICORT) 160-4.5 mcg/actuation HFAA 1 Inhaler 0     Sig: Take 2 Puffs by inhalation two (2) times a day. Authorizing Provider: Jennifer Malloy     Ordering User: Talita Rathke albuterol (PROVENTIL HFA, VENTOLIN HFA, PROAIR HFA) 90 mcg/actuation inhaler 1 Inhaler 0     Sig: Take 2 Puffs by inhalation every six (6) hours as needed for Wheezing. Authorizing Provider: Jennifer Malloy     Ordering User: Talita Blum albuterol (PROVENTIL VENTOLIN) 2.5 mg /3 mL (0.083 %) nebulizer solution 24 Each 0     Sig: 3 mL by Nebulization route every four (4) hours as needed for Wheezing. Authorizing Provider: Jennifer Malloy     Ordering User: Shellie Oconnell     Patient notified of refills, per Jean-Paul Santana, one refill only and he needs to make appointment with PCP.

## 2018-01-31 NOTE — H&P
Radiology History and Physical    Patient: Caridad Dockery 74 y.o. female     Referring Physician: Aisha Tenorio. Chief Complaint: No chief complaint on file. History of Present Illness: Lung cancer with liver lesions. History:    Past Medical History:   Diagnosis Date    Cancer ([facility]     Lung CA    Chronic obstructive pulmonary disease (HCC)     Emphysema     Heart failure ([facility]      History reviewed. No pertinent family history. Social History     Social History    Marital status:      Spouse name: N/A    Number of children: N/A    Years of education: N/A     Occupational History    Not on file. Social History Main Topics    Smoking status: Former Smoker     Packs/day: 1.00     Years: 40.00     Quit date: 2/6/2017    Smokeless tobacco: Former User     Quit date: 10/26/2016    Alcohol use No    Drug use: No    Sexual activity: Not on file     Other Topics Concern    Not on file     Social History Narrative       Allergies: No Known Allergies    Current Medications:  Current Outpatient Prescriptions   Medication Sig    lisinopril (PRINIVIL, ZESTRIL) 20 mg tablet Take 1 Tab by mouth daily.  tiotropium (SPIRIVA WITH HANDIHALER) 18 mcg inhalation capsule Take 1 Cap by inhalation every evening.  budesonide-formoterol (SYMBICORT) 160-4.5 mcg/actuation HFAA Take 2 Puffs by inhalation two (2) times a day.  albuterol (PROVENTIL HFA, VENTOLIN HFA, PROAIR HFA) 90 mcg/actuation inhaler Take 2 Puffs by inhalation every six (6) hours as needed for Wheezing.  spironolactone (ALDACTONE) 25 mg tablet Take 1 Tab by mouth daily.  azithromycin (ZITHROMAX) 250 mg tablet Take 1 Tab by mouth daily.  metoprolol succinate (TOPROL-XL) 50 mg XL tablet Take 1 Tab by mouth daily.  ondansetron hcl (ZOFRAN) 8 mg tablet Take 8 mg by mouth every eight (8) hours as needed for Nausea.  prochlorperazine (COMPAZINE) 10 mg tablet Take 10 mg by mouth every six (6) hours as needed for Nausea.     albuterol (PROVENTIL VENTOLIN) 2.5 mg /3 mL (0.083 %) nebulizer solution 3 mL by Nebulization route every four (4) hours as needed for Wheezing.  0.9% sodium chloride inhalation Take 5 mL by inhalation two (2) times a day.  aspirin 81 mg chewable tablet Take 1 Tab by mouth daily. Current Facility-Administered Medications   Medication Dose Route Frequency    0.9% sodium chloride infusion 500 mL  500 mL IntraVENous CONTINUOUS    saline peripheral flush soln 10 mL  10 mL InterCATHeter PRN    midazolam (VERSED) injection 5 mg  5 mg IntraVENous Multiple    fentaNYL citrate (PF) injection 200 mcg  200 mcg IntraVENous Multiple    sodium bicarbonate (4%) (NEUT) injection 1 mL  1 mL SubCUTAneous RAD ONCE     Facility-Administered Medications Ordered in Other Encounters   Medication Dose Route Frequency    sodium chloride 0.9 % bolus infusion 100 mL  100 mL IntraVENous RAD ONCE    iopamidol (ISOVUE 300) 61 % contrast injection 100 mL  100 mL IntraVENous RAD ONCE    sodium chloride (NS) flush 10 mL  10 mL IntraVENous RAD ONCE        Physical Exam:  Blood pressure 115/54, pulse 81, temperature 97.8 °F (36.6 °C), resp. rate 25, height 5' 6\" (1.676 m), weight 51.3 kg (113 lb), SpO2 98 %. GENERAL: alert, cooperative, no distress, appears stated age, LUNG: clear to auscultation bilaterally,expiratory wheezes HEART: regular rate and rhythm      Alerts:    Hospital Problems  Date Reviewed: 1/31/2018    None          Laboratory:    No results for input(s): HGB, HCT, WBC, PLT, INR, BUN, CREA, K, CRCLT, HGBEXT, HCTEXT, PLTEXT in the last 72 hours. No lab exists for component: PTT, PT, INREXT      Plan of Care/Planned Procedure:  Risks, benefits, and alternatives reviewed with patient and she agrees to proceed with the procedure. Full dictated report to follow.

## 2018-01-31 NOTE — DISCHARGE INSTRUCTIONS
[address] [facility] [address] [place] [address]  Department of Interventional Radiology  [facility]    BIOPSY DISCHARGE INSTRUCTIONS    General Instructions:     A biopsy is the removal of a small piece of tissue for microscopic examination or testing. Healthy tissue can be obtained for the purpose of tissue-type matching for transplants. Unhealthy tissues are more commonly biopsied to diagnose disease. Liver Biopsy: This test helps detect cancer, infections, and the cause of an enlargement of the liver or elevated liver enzymes. It also helps to diagnose a number of liver diseases. The pain associated with the procedure may be felt in the shoulder. The risks include internal bleeding, pneumothorax, and injury to the surrounding organs. General Biopsy:     A mass can grow in any area of the body, and we are taking a specimen as ordered by your doctor. The risks are the same. They include bleeding, pain, and infection. Home Care Instructions: You may resume your regular diet and medication regimen. Do not drink alcohol, drive, or make any important legal decisions in the next 24 hours. Do not lift anything heavier than a gallon of milk until the soreness goes away. You may use over the counter acetaminophen or ibuprofen for the soreness. You may apply an ice pack to the affected area for 20-30 minutes at time for the first 24 hours. After that, you may apply a heat pack. Call If: You should call your Physician and/or the Radiology Nurse if you have any questions or concerns about the biopsy site. Call if you should have increased pain, fever, redness, drainage, or bleeding more than a small spot on the bandage. Follow-Up Instructions: Please see your ordering doctor as he/she has requested. To Reach Us:    Side effects of sedation medications and other medications used today have been reviewed.   Notify us of nausea, itching, hives, dizziness, or anything else out of the ordinary. Should you experience any of these significant changes, please call [phone] between the hours of 7:30 am and 10 pm or [phone] after hours.  After hours, ask the  to page the [address] Technologist, and describe the problem to the technologist.       Patient Signature:  Date: 1/31/2018  Discharging Nurse: Daniel Bee RN

## 2018-02-12 NOTE — TELEPHONE ENCOUNTER
Telephone Call RE:  Appointment reminder     Outcome:     [] Patient confirmed appointment   [] Patient rescheduled appointment for    [] Unable to reach   [] Left message              [x] Other:     Spoke with patients  who states patient isn't feeling well and needs to cancel her appointment for now.     She will call us back if needed      Elena Millan

## 2018-02-16 NOTE — PROGRESS NOTES
TRANSFER - OUT REPORT:    Verbal report given to Khushboo Elias RN (name) on Yessenia Macdonald  being transferred to [facility] (unit) for routine progression of care       Report consisted of patients Situation, Background, Assessment and   Recommendations(SBAR). Information from the following report(s) SBAR, Kardex, Intake/Output, MAR and Recent Results was reviewed with the receiving nurse. Lines:   Peripheral IV 02/16/18 Left Forearm (Active)   Site Assessment Clean, dry, & intact 2/16/2018  1:00 PM   Phlebitis Assessment 0 2/16/2018  1:00 PM   Infiltration Assessment 0 2/16/2018  1:00 PM   Dressing Status Clean, dry, & intact 2/16/2018  1:00 PM   Dressing Type Tape 2/16/2018  1:00 PM   Hub Color/Line Status Blue 2/16/2018  1:00 PM   Action Taken Blood drawn 2/16/2018 12:40 PM        Opportunity for questions and clarification was provided.       Patient transported with:   [employer]

## 2018-02-16 NOTE — ED NOTES
0800: Bedside and Verbal shift change report given to Tami Levy RN (oncoming nurse) by Liz Rausch RN (offgoing nurse). Report included the following information SBAR, ED Summary and Recent Results. Patient currently in [address] [facility]. 0830: Patient back from [address] [facility], remains on 3L NC, VSS, aware of plan of care. 0900: Patient remains on 3L NC, assessment unchanged, slight wheezing noted, respirations equal and unlabored at rest.     1300: Bedside and Verbal shift change report given to Navya Bui RN (oncoming nurse) by Tami Levy RN (offgoing nurse). Report included the following information SBAR and Recent Results. Patients VSS, aware of plan care, IV patent and infusing.     Visit Vitals    /51 (BP 1 Location: Left arm, BP Patient Position: At rest)    Pulse 88    Temp 98 °F (36.7 °C)    Resp 20    Ht 5' 6\" (1.676 m)    Wt 51.3 kg (113 lb)    SpO2 97%    BMI 18.24 kg/m2

## 2018-02-16 NOTE — H&P
[address] [facility] CARE HISTORY AND PHYSICAL    Name:MILAD WORLEY  MR#: [id number]  : 1950  ACCOUNT #: [de-identified]   DATE OF SERVICE: 2018    CHIEF COMPLAINT:  Shortness of breath. HISTORY OF PRESENT ILLNESS:  The patient is a 44-year-old female with past medical history of metastatic lung cancer, congestive heart failure, COPD on chronic oxygen at home, history of multiple thyroid nodules, tobacco use disorder, status post ICD placement, nonischemic cardiomyopathy who presents to the hospital with the above mentioned symptoms. Patient reports that about two days back, she had some blood work done and was found to be hyponatremic going in for IV infusion and was fine after that. Patient reports that yesterday she started having some wheezing associated with shortness of breath, cough and dyspnea on exertion. The symptoms progressively got worse. She had to increase her home oxygen at 3 liters, but that did not seem to help and she decided to come to the hospital.  The patient reports that the cough is somewhat productive of yellowish colored sputum. She also reports that she had some chills, but denies any fevers associated with her symptoms. Patient reports that she has history of lung cancer, had right lobectomy in  and left lobectomy in , currently on chemotherapy. Patient also reports that she was told that there is \"new cancer\" in her liver. I am not sure whether this was metastases versus primary liver cancer, though she reports that \"it is being the same chemotherapy. \"  Patient denies any other concerns or problems. Denies any headache, blurry vision, sore throat, trouble swallowing, trouble with speech, any chest pain, abdominal pain, constipation, diarrhea, urinary symptoms, focal or generalized neurological weakness, recent travel, sick contacts, falls, injuries, hematemesis, melena, hemoptysis. PAST MEDICAL HISTORY:  See above.     HOME MEDICATIONS:  The patient is on spironolactone 25 mg every day, metoprolol 50 mg every day, Zofran 8 mg as needed, Compazine as needed. Aspirin 81 mg daily, omeprazole 20 mg daily, Lasix 20 mg every day as needed, lisinopril 20 mg daily, albuterol p.r.n. SOCIAL HISTORY:  Former smoker, used to smoke 1 pack per day for 40 years, quit in 02/2017. No alcohol, no IV drug abuse, lives at home. ALLERGIES:  NO KNOWN ALLERGIES. CODE STATUS: FULL CODE. REVIEW OF SYSTEMS:  All systems were reviewed and essentially negative except for the symptoms mentioned above. PHYSICAL EXAMINATION:  VITAL SIGNS:  Temperature 98.4, pulse 101, respiration rate 17, blood pressure 113/66, pulse ox 99% on 2 liters. GENERAL:  Alert x3, awake, mildly distressed, pleasant female, appears to be stated age. HEENT:  Pupils equal and reactive to light. Dry mucous membranes. Tympanic membrane were clear. NECK:  Supple. CHEST:  Scattered wheezes throughout lung fields. CORONARY:  S1, S2 were heard. ABDOMEN:  Soft, nontender, nondistended. Bowel sounds are physiological.  EXTREMITIES:  No clubbing, no cyanosis, no edema. NEUROPSYCHIATRIC:  Pleasant mood and affect. Cranial nerves II-XII grossly intact. Sensory grossly within normal limits. DTR 2+. Strength 5/5. SKIN:  Warm. LABORATORY DATA:  White count 11, hemoglobin 11.8, hematocrit 34.3, platelets 762. Sodium 142, potassium 6.4, chloride 86, bicarbonate 27, anion gap 9, glucose 71, BUN 9, creatinine 0.92, calcium is 9.5, phosphorus 2.9, , , alkaline phosphatase 212. , CK-MB 2.4, troponin less than 0.04. . ABG:  pH 7.412, pCO2 of 44, pO2 105. CTA of the chest shows no acute pulmonary emboli, extensive emphysema with postsurgical changes as above and unchanged right-sided lung nodule. Interval development of hepatomegaly with heterogeneous enhancement which is nonspecific. Ultrasound of the abdomen is pending.   EKG shows normal sinus rhythm. ASSESSMENT AND PLAN:  1. Shortness of breath, most likely secondary to chronic obstructive pulmonary disease exacerbation with underlying metastatic pulmonary malignancy. The patient will be observed on a telemetry bed. We will start patient on steroids, IV antibiotics, DuoNebs, oxygen support, pulse ox monitoring and further intervention will be per hospital course. CTA of the chest does not show any pulmonary embolism and/or any signs of pneumonia. We will continue to closely monitor. The patient has improved since her arrival to the hospital and hopefully will be discharged in the next 24-48 hours. We will reassess as needed and continue to closely monitor. 2.  Hyponatremia, recurrent, probably secondary to malignancy. I will start patient on gentle IV hydration with normal saline. Closely monitor. In light of patient having congestive heart failure, we will provide fluid restriction and repeat BMP in 12 hours. We will provide neurovascular checks and further intervention will be per hospital course. We will reassess as needed. 3.  Elevated liver enzymes/Tansaminitis. Appear to be chronic and is probably secondary to metastatic disease. We will obtain records from the oncology practice. We will get a right upper quadrant ultrasound, avoid Tylenol. Closely monitor. Repeat labs in the morning and may consider getting a GI consult. Further intervention will be per hospital course. 4.  History of lung cancer. Supportive care and continue to monitor. Reassess as needed. 5.  History of congestive heart failure, currently does not appear to be in exacerbation. We will monitor strict I's and O's and volume status. If any concerns, we will manage emergent situations, continue to monitor. 6.  Gastrointestinal and deep vein thrombosis prophylaxis. Patient will be on sequential compression devices.       Irwin Sanchez MD MM / QUITA  D: 02/16/2018 08:59     T: 02/16/2018 09:34  JOB #: [id number]

## 2018-02-16 NOTE — ED PROVIDER NOTES
HPI Comments: 74 y.o. female with past medical history significant for metastatic lung cancer, heart failure, COPD, on chronic 2L O2, who presents from EMS with chief complaint of SOB. Pt notes that she has had progressively worsening breathing difficulty that since yesterday. Prior to arrival pt had to increase her regular O2 to 3L. EMS administered a duo-neb en route to the ED. Pt states that her dyspnea is somewhat improved in the ED, but not resolved. Accompanying sx include a cough which is occasionally productive, and congestion. Pt states that she recently had blood work done for her oncologist. Notes that based on the results she was given \"some type of infusion for low sodium\". Denies f/c, wheezing, n/v, back pain, CP, abd pain, diarrhea, constipation, and leg swelling. There are no other acute medical concerns at this time. Social Hx: Negative for Tobacco use (former smoker, quit in 06/2017); Negative for EtOH use; Negative for Illicit Drug Abuse    PCP: Edis Brasher MD  Pulmonology:  Dr. Debby Dang     Note written by Davey Veliz, as dictated by Ammy Flores MD 3:29 AM      The history is provided by the patient. No  was used. Past Medical History:   Diagnosis Date    Cancer [facility])     Lung CA    Chronic obstructive pulmonary disease (HCC)     Emphysema     Heart failure (HCC)        Past Surgical History:   Procedure Laterality Date    CHEST SURGERY PROCEDURE UNLISTED Bilateral 2007/2009    lung lobe resection    HX CHOLECYSTECTOMY      HX GYN      GEETA    HX ORTHOPAEDIC      shoulder    HX OTHER SURGICAL      pacemaker/defib    INS PPM/ICD LED SING ONLY  11/4/2016              No family history on file. Social History     Social History    Marital status:      Spouse name: N/A    Number of children: N/A    Years of education: N/A     Occupational History    Not on file.      Social History Main Topics    Smoking status: Former Smoker Packs/day: 1.00     Years: 40.00     Quit date: 2/6/2017    Smokeless tobacco: Former User     Quit date: 10/26/2016    Alcohol use No    Drug use: No    Sexual activity: Not on file     Other Topics Concern    Not on file     Social History Narrative         ALLERGIES: Review of patient's allergies indicates no known allergies. Review of Systems   Constitutional: Negative for chills and fever. HENT: Positive for congestion. Respiratory: Positive for cough and shortness of breath. Negative for wheezing. Cardiovascular: Negative for chest pain and leg swelling. Gastrointestinal: Negative for abdominal pain, constipation, diarrhea, nausea and vomiting. Musculoskeletal: Negative for back pain. All other systems reviewed and are negative. Vitals:    02/16/18 0322 02/16/18 0349   BP: 111/63    Pulse: 92    Resp: 16    Temp: 98.4 °F (36.9 °C)    SpO2: 99% 99%   Weight: 51.3 kg (113 lb)    Height: 5' 6\" (1.676 m)             Physical Exam   Nursing note and vitals reviewed. CONSTITUTIONAL: Frail elderly female; in  mild distress  HEAD: Normocephalic; atraumatic  EYES: PERRL; EOM intact; conjunctiva and sclera are clear bilaterally. ENT: No rhinorrhea; normal pharynx with no tonsillar hypertrophy; mucous membranes pink/moist, no erythema, no exudate. NECK: Supple; non-tender; no cervical lymphadenopathy  CARD: Normal S1, S2; no murmurs, rubs, or gallops. Regular rate and rhythm. RESP: Normal respiratory effort; coarse breath sounds equal bilaterally with I/E wheezes; No rhonchi, or rales. ABD: Normal bowel sounds; non-distended; non-tender; no palpable organomegaly, no masses, no bruits. Back Exam: Normal inspection; no vertebral point tenderness, no CVA tenderness. Normal range of motion. EXT: Normal ROM in all four extremities; non-tender to palpation; no swelling or deformity; distal pulses are normal, no edema. SKIN: Warm; dry; no rash.   NEURO:Alert and oriented x 3, coherent, SENA-XII grossly intact, sensory and motor are non-focal.      MDM  Number of Diagnoses or Management Options  Diagnosis management comments: Assessment: 72-year-old female with history of lung cancer and COPD exacerbation with chronic respiratory failure and oxygen dependent at 2 L nasal / CHF-who presents with shortness of breath. Differential diagnoses consist of COPD, exacerbation/ bronchitis/ pneumonia/ CHF, and pleurisy      Plan: EKG/ chest x-ray/ lab/ DuoNeb/ steroid/ serial exam/ Monitor and Reevaluate. Amount and/or Complexity of Data Reviewed  Clinical lab tests: ordered and reviewed  Tests in the radiology section of CPT®: ordered and reviewed  Tests in the medicine section of CPT®: reviewed and ordered  Discussion of test results with the performing providers: yes  Decide to obtain previous medical records or to obtain history from someone other than the patient: yes  Obtain history from someone other than the patient: yes  Review and summarize past medical records: yes  Discuss the patient with other providers: yes  Independent visualization of images, tracings, or specimens: yes    Risk of Complications, Morbidity, and/or Mortality  Presenting problems: moderate  Diagnostic procedures: moderate  Management options: moderate    Patient Progress  Patient progress: stable        ED Course       Procedures     ED EKG interpretation:  Rhythm: normal sinus rhythm; and regular . Rate (approx.): 84; Axis: normal; P wave: normal; QRS interval: normal ; ST/T wave: non-specific changes; in  Lead: Diffusely; Other findings: abnormal ekg. This EKG was interpreted by Wayne Lipscomb MD,ED Provider. XRAY INTERPRETATION (ED MD)  Chest Xray  No acute process seen. Normal heart size. No bony abnormalities. No infiltrate. Wayne Lipscomb MD 3:33 AM    PROGRESS NOTE:  Pt has been reexamined by Wayne Lipscomb MD all available results have been reviewed with pt and any available family.  Pt understands sx, dx, and tx in ED. Care plan has been outlined and questions have been answered. Pt and any available family understands and agrees to need for admission to hospital for further tx not available in ED. Pt is ready for admission. Written by Rosa Ibrahim MD,  6:21 AM    CONSULT NOTE:  Rosa Ibrahim MD spoke with Dr. Timbo Wharton of the adult hospitalist team. Discussed patient's presentation, history, physical assessment, and available diagnostic results.  He will evaluate, write orders and admit the patient to the hospital. 6:21 AM    .

## 2018-02-16 NOTE — PROGRESS NOTES
Admission Medication Reconciliation:    Information obtained from: Patient, medication list from home     Significant PMH/Disease States:   Past Medical History:   Diagnosis Date    Cancer ([facility]     Lung CA    Chronic obstructive pulmonary disease ([facility]     Emphysema     Heart failure ([facility]        Chief Complaint for this Admission:  SOB    Allergies:  Review of patient's allergies indicates no known allergies. Prior to Admission Medications:   Prior to Admission Medications   Prescriptions Last Dose Informant Patient Reported? Taking?   0.9% sodium chloride inhalation 2/15/2018 at Unknown time  No Yes   Sig: Take 5 mL by inhalation two (2) times a day. albuterol (PROVENTIL HFA, VENTOLIN HFA, PROAIR HFA) 90 mcg/actuation inhaler 2/15/2018 at Unknown time  No Yes   Sig: Take 2 Puffs by inhalation every six (6) hours as needed for Wheezing. albuterol (PROVENTIL VENTOLIN) 2.5 mg /3 mL (0.083 %) nebulizer solution 2/15/2018 at Unknown time  No Yes   Sig: 3 mL by Nebulization route every four (4) hours as needed for Wheezing. aspirin 81 mg chewable tablet 2/15/2018 at Unknown time  No Yes   Sig: Take 1 Tab by mouth daily. azithromycin (ZITHROMAX) 250 mg tablet 2/15/2018 at Unknown time  No Yes   Sig: Take 1 Tab by mouth daily. budesonide-formoterol (SYMBICORT) 160-4.5 mcg/actuation HFAA 2/15/2018 at Unknown time  No Yes   Sig: Take 2 Puffs by inhalation two (2) times a day. furosemide (LASIX) 20 mg tablet   Yes Yes   Sig: Take 20 mg by mouth daily as needed (SOB, Edema as directed by Cardiologist). lisinopril (PRINIVIL, ZESTRIL) 20 mg tablet 2/15/2018 at Unknown time  No Yes   Sig: Take 1 Tab by mouth daily. magnesium hydroxide (COPPOLA MILK OF MAGNESIA) 400 mg/5 mL suspension   Yes Yes   Sig: Take 30 mL by mouth daily as needed for Constipation. metoprolol succinate (TOPROL-XL) 50 mg XL tablet 2/15/2018 at Unknown time  No Yes   Sig: Take 1 Tab by mouth daily.    omeprazole (PRILOSEC) 20 mg capsule   Yes Yes   Sig: Take 20 mg by mouth daily. ondansetron hcl (ZOFRAN) 8 mg tablet 2/15/2018 at Unknown time  Yes Yes   Sig: Take 8 mg by mouth every eight (8) hours as needed for Nausea. prochlorperazine (COMPAZINE) 10 mg tablet 2/15/2018 at Unknown time  Yes Yes   Sig: Take 10 mg by mouth every six (6) hours as needed for Nausea. senna (SENNA) 8.6 mg tablet   Yes Yes   Sig: Take 2 Tabs by mouth daily. spironolactone (ALDACTONE) 25 mg tablet 2/15/2018 at Unknown time  No Yes   Sig: Take 1 Tab by mouth daily. tiotropium (SPIRIVA WITH HANDIHALER) 18 mcg inhalation capsule 2/15/2018 at Unknown time  No Yes   Sig: Take 1 Cap by inhalation every evening. Facility-Administered Medications: None         Comments/Recommendations: Medication list updated after interviewing patient and a review of the medication list from home.    Will add Senna, Omeprazole, Milk of Magnesia and Furosemide    Darrin Worley, NormaD

## 2018-02-16 NOTE — ED TRIAGE NOTES
Patient arrived via EMS from home reporting shortness of breath since yesterday. Patient has a history of lung cancer and COPD. Patient is on 2L of O2 at baseline, but raised it to 3 L for comfort. Patient has a course cough in triage, but does not report it being productive. Patient received a Duo-Neb en route.

## 2018-02-17 NOTE — CONSULTS
Assessment:  Hyponatremia: Na remains low->given isotonic saline yesterday. Suspect SIADH 2 to underlying metastatic cancer maybe exacerbated by Opdivo    Hyperkalemia: 2 to Lisinopril/Aldactone    Lung CA: w/likely mets. Prior hx of B/l lobectomy/chemo/XRT . Recently started on Opdivo by Dr. Shireen Thakur ([facility])    COPD    NICM      Plan/Recommendations:  Send off Urine Na, Urine osm  Check TSH, Am Cortisol  Will start trial of IV Lasix to help facilitate aquaresis  Repeat BMP at 6pm  Fluid restriction 1.2L/day  Hold Lisinopril (already got this mornings dose)->place on Low K diet  Strict I/Os  Am labs      Discussed with patient    Thanks for the consultation. Renal service will follow patient with you. Please contact me with any questions or concerns. Initial Consult note         Patient name: Brett Kendall  MR no: [id number]  Date of admission: 2/16/2018  Date of consultation: 2/17/2018  Requested by: Dr. Ceci Fernandez  Reason for consult: Hyponatremia    Patient seen and examined. History obtained from patient and chart review. Relevant labs, data and notes reviewed. HPI: Brett Kendall is a 74 y.o. female with PMH significant for COPD, Lung CA (s/p b/l lobectomy/Chemo/XRT), CHF (EF 55-60%), AICD presented with worsening SOB. Admission labs notable for serum Na level of 122. Nephrology consulted to evaluate and manage hyponatremia. Review of prior lab work shows mild hyponatremia back in 8/2017 (Na 129-130). Reports new hepatic mets noted about a month ago-> started on Opdivo by Dr. Fabiola Simmons ([facility]). Reportedly was told of having hyponatremia as an outpt-> given an \" infusion\" to help? Reports no recent NSAIDS. Mild issues with balance. No falls. Denies any urinary retention. Denies excessive free water intake. Poor solute intake. Denies any significant weight gain or loss.      PMH:  Past Medical History:   Diagnosis Date    Cancer [facility])     Lung CA    [facility] obstructive pulmonary [facility] ([facility]     Emphysema     Heart failure (HCC)      PSH:  Past Surgical History:   Procedure Laterality Date    CHEST SURGERY PROCEDURE UNLISTED Bilateral 2007/2009    lung lobe resection    HX CHOLECYSTECTOMY      HX GYN      GEETA    HX ORTHOPAEDIC      shoulder    HX OTHER SURGICAL      pacemaker/defib    INS PPM/ICD LED SING ONLY  11/4/2016            Social history:   Social History   Substance Use Topics    Smoking status: Former Smoker     Packs/day: 1.00     Years: 40.00     Quit date: 2/6/2017    Smokeless tobacco: Former User     Quit date: 10/26/2016    Alcohol use No       Family history:  No history of CKD or ESRD in the family.      No Known Allergies    Current Facility-Administered Medications   Medication Dose Route Frequency Last Dose    [START ON 2/18/2018] predniSONE (DELTASONE) tablet 60 mg  60 mg Oral DAILY WITH BREAKFAST      aspirin chewable tablet 81 mg  81 mg Oral DAILY 81 mg at 02/17/18 0827    lisinopril (PRINIVIL, ZESTRIL) tablet 20 mg  20 mg Oral DAILY 20 mg at 02/17/18 0827    metoprolol succinate (TOPROL-XL) XL tablet 50 mg  50 mg Oral DAILY 50 mg at 02/17/18 0827    sodium chloride (NS) flush 5-10 mL  5-10 mL IntraVENous Q8H 10 mL at 02/17/18 0507    sodium chloride (NS) flush 5-10 mL  5-10 mL IntraVENous PRN      cefTRIAXone (ROCEPHIN) 1 g in 0.9% sodium chloride (MBP/ADV) 50 mL  1 g IntraVENous Q24H 1 g at 02/17/18 0948    azithromycin (ZITHROMAX) 500 mg in 0.9% sodium chloride (MBP/ADV) 250 mL  500 mg IntraVENous Q24H 500 mg at 02/17/18 0828    morphine injection 0.5 mg  0.5 mg IntraVENous Q4H PRN 0.5 mg at 02/17/18 0644    albuterol-ipratropium (DUO-NEB) 2.5 MG-0.5 MG/3 ML  3 mL Nebulization Q4H RT 3 mL at 02/17/18 0856    pantoprazole (PROTONIX) tablet 40 mg  40 mg Oral DAILY 40 mg at 02/17/18 0827    glucose chewable tablet 16 g  4 Tab Oral PRN      dextrose (D50W) injection syrg 12.5-25 g  12.5-25 g IntraVENous PRN      glucagon (GLUCAGEN) injection 1 mg  1 mg IntraMUSCular PRN      insulin lispro (HUMALOG) injection   SubCUTAneous AC&HS Stopped at 02/16/18 1130    arformoterol (BROVANA) neb solution 15 mcg  15 mcg Nebulization BID RT 15 mcg at 02/16/18 1006    And    budesonide (PULMICORT) 500 mcg/2 ml nebulizer suspension  500 mcg Nebulization BID  mcg at 02/17/18 0856       ROS (besides HPI):    General: No fever. No weight changes  ENT: No hearing loss or visual changes  Cardiovascular: No Chest pain  Pulmonary: + SOB  GI: No abdominal pain. No Nausea/Vomiting/Diarrhea. No blood in stool  : No blood in urine. No foamy or cloudy urine  Musculoskeletal: No joint swelling or redness. No morning stiffness  Endocrine: no cold or heat intolerance  Psych: denies anxiety or depression  Neuro: No light headedness or dizziness    Objective   Visit Vitals    /79 (BP 1 Location: Right arm, BP Patient Position: At rest)    Pulse 92    Temp 98.6 °F (37 °C)    Resp 20    Ht 5' 6\" (1.676 m)    Wt 50.8 kg (111 lb 15.9 oz)    SpO2 100%    Breastfeeding No    BMI 18.08 kg/m2       Physical Exam:    Gen: NAD    HEENT: AT/NC, EOMI, moist mucous membrane, no scleral icterus    Neck: no JVD, no cervical lymphadenopathy, no carotid bruit    Lungs/Chest wall: Coarse BS B/L    Cardiovascular: S1/S2, normal rate, regular rhythm. +AICD    Abdomen: soft, NT, ND, BS+, no HSM    Ext: no clubbing or cyanosis. No edema    Skin: warm and dry. No rashes    : no CVA tenderness, no bladder distension noted    CNS: alert awake. Answers appropriately.      Labs/Data:    Lab Results   Component Value Date/Time    Sodium 123 (L) 02/17/2018 03:35 AM    Potassium 5.4 (H) 02/17/2018 03:35 AM    Chloride 89 (L) 02/17/2018 03:35 AM    CO2 24 02/17/2018 03:35 AM    Anion gap 10 02/17/2018 03:35 AM    Glucose 91 02/17/2018 03:35 AM    BUN 14 02/17/2018 03:35 AM    Creatinine 1.01 02/17/2018 03:35 AM    BUN/Creatinine ratio 14 02/17/2018 03:35 AM    GFR est AA >60 02/17/2018 03:35 AM    GFR est non-AA 55 (L) 02/17/2018 03:35 AM    Calcium 9.4 02/17/2018 03:35 AM       Lab Results   Component Value Date/Time    WBC 16.0 (H) 02/17/2018 03:35 AM    Hemoglobin (POC) 11.9 02/16/2018 09:45 AM    HGB 11.1 (L) 02/17/2018 03:35 AM    Hematocrit (POC) 35 02/16/2018 09:45 AM    HCT 32.5 (L) 02/17/2018 03:35 AM    PLATELET 725 63/19/8521 03:35 AM    MCV 92.9 02/17/2018 03:35 AM       Urine analysis: not done this admission        No components found for: SPEP, UPEP  No results found for: PUQ, PROTU2, PROTU1, BJP1, CPE1, IMEL1, MET2  No results found for: MCACR, MCA1, MCA2, MCA3, MCAU, MCAU2, MCALPOCT      Intake/Output Summary (Last 24 hours) at 02/17/18 1016  Last data filed at 02/17/18 5309   Gross per 24 hour   Intake              615 ml   Output                0 ml   Net              615 ml       Wt Readings from Last 3 Encounters:   02/17/18 50.8 kg (111 lb 15.9 oz)   01/31/18 51.3 kg (113 lb)   01/08/18 51.3 kg (113 lb)       Signed by:  Beth Prescott MD  Nephrology and Hypertension  [facility]

## 2018-02-17 NOTE — PROGRESS NOTES
Problem: Chronic Obstructive Pulmonary Disease (COPD)  Goal: *Able to remain out of bed as prescribed  Outcome: Progressing Towards Goal  Patient ambulates to bathroom with minimal assistance. No signs/symptoms of increasing distress. Will continue to monitor. 0750- Bedside and Verbal shift change report given to Jovita England (oncoming nurse) by Sarah Marin (offgoing nurse). Report included the following information SBAR, Kardex, Intake/Output, MAR, Recent Results and Cardiac Rhythm Carter

## 2018-02-17 NOTE — PROGRESS NOTES
Hospitalist Progress Note  Keesha Motta MD  Answering service: [phone] from in house phone  Cell: [phone]      Date of Service:  2018  NAME:  Caryle Canterbury  :  1950  MRN:  [id number]      Admission Summary:   The patient is a 72-year-old female with past medical history of metastatic lung cancer, congestive heart failure, COPD on chronic oxygen at home, history of multiple thyroid nodules, tobacco use disorder, status post ICD placement, nonischemic cardiomyopathy who presents to the hospital with the above mentioned symptoms. Patient reports that about two days back, she had some blood work done and was found to be hyponatremic going in for IV infusion and was fine after that. Patient reports that yesterday she started having some wheezing associated with shortness of breath, cough and dyspnea on exertion. The symptoms progressively got worse. She had to increase her home oxygen at 3 liters, but that did not seem to help and she decided to come to the hospital.  The patient reports that the cough is somewhat productive of yellowish colored sputum. She also reports that she had some chills, but denies any fevers associated with her symptoms. Patient reports that she has history of lung cancer, had right lobectomy in  and left lobectomy in , currently on chemotherapy. Patient also reports that she was told that there is \"new cancer\" in her liver. I am not sure whether this was metastases versus primary liver cancer, though she reports that \"it is being the same chemotherapy. \"  Patient denies any other concerns or problems.   Denies any headache, blurry vision, sore throat, trouble swallowing, trouble with speech, any chest pain, abdominal pain, constipation, diarrhea, urinary symptoms, focal or generalized neurological weakness, recent travel, sick contacts, falls, injuries, hematemesis, melena, hemoptysis.     Interval history / Subjective: This AM patient stating she still feels SOB despite recently receiving a neb treatment, but she is \"better than yesterday. \" Per her family at the bedside, she does seem to be improving. Assessment & Plan:     Shortness of breath, most likely secondary to chronic obstructive pulmonary disease exacerbation with underlying metastatic pulmonary malignancy    -will change IV steroids to PO  -continue IV antibiotics for now; transition to PO tomorrow  -continue DuoNebs, oxygen support, pulse ox monitoring   -CTA of the chest does not show any pulmonary embolism and/or any signs of pneumonia  -minimal wheezing on exam     Hyponatremia, recurrent, probably secondary to malignancy  -patient with dropping sodium to 123 from 125 yesterday  -Nephrology was consulted this AM  -sent urine electrolytes, IV lasix, and fluid restriction  -strict Is/Os  -Appreciate further recs    Elevated liver enzymes/Tansaminitis  -likely secondary to metastatic disease.    -multiple masses seen on liver ultrasound    History of lung cancer  -patient is currently on chemotherapy  -very poor prognosis given clinical picture and masses in liver  -consider Palliative consult if no improvement tomorrow    History of congestive heart failure: currently does not appear to be in exacerbation    Start prophylactic Lovenox today for DT ppx in this patient with malignancy     Code status: Full    Care Plan discussed with: Patient/Family  Disposition: TBD     Hospital Problems  Date Reviewed: 2/16/2018          Codes Class Noted POA    * (Principal)SOB (shortness of breath) ICD-10-CM: R06.02  ICD-9-CM: 786.05  8/2/2017 Unknown              Review of Systems:   Pertinent items are noted in HPI. Vital Signs:    Last 24hrs VS reviewed since prior progress note.  Most recent are:  Visit Vitals    /79 (BP 1 Location: Right arm, BP Patient Position: At rest)    Pulse 92    Temp 98.6 °F (37 °C)    Resp 20    Ht 5' 6\" (1.676 m)    Wt 50.8 kg (111 lb 15.9 oz)    SpO2 98%    Breastfeeding No    BMI 18.08 kg/m2         Intake/Output Summary (Last 24 hours) at 02/17/18 0844  Last data filed at 02/17/18 3206   Gross per 24 hour   Intake              865 ml   Output                0 ml   Net              865 ml        Physical Examination:             Constitutional:  Mild distress, cooperative, pleasant    ENT:  Neck supple   Resp:  Expiratory wheeze. Decreased sounds   CV:  Regular rhythm, normal rate, no murmurss    GI:  Soft, non distended, non tender    Musculoskeletal:  No edema    Neurologic:  Moves all extremities.   AAOx3           Data Review:    Review and/or order of clinical lab test  Review and/or order of tests in the radiology section of [facility]      Labs:     Recent Labs      02/17/18   0335  02/16/18   0333   WBC  16.0*  11.0   HGB  11.1*  11.8   HCT  32.5*  34.3*   PLT  176  166     Recent Labs      02/17/18   0335  02/16/18   1543  02/16/18   0333   NA  123*  125*  122*   K  5.4*  5.2*  6.4*   CL  89*  89*  86*   CO2  24  23  27   BUN  14  11  9   CREA  1.01  1.15*  0.92   GLU  91  121*  71   CA  9.4  8.9  9.5   MG   --    --   1.7   PHOS   --    --   2.9     Recent Labs      02/16/18   0333   SGOT  188*   ALT  227*   AP  212*   TBILI  0.9   TP  6.6   ALB  2.8*   GLOB  3.8     Recent Labs      02/16/18   0428   INR  1.0   PTP  10.4   APTT  26.3      Recent Labs      02/16/18   0333   CPK  187   CKNDX  1.3   TROIQ  <0.04     Lab Results   Component Value Date/Time    Cholesterol, total 178 10/27/2016 03:00 AM    HDL Cholesterol 61 10/27/2016 03:00 AM    LDL, calculated 99.8 10/27/2016 03:00 AM    Triglyceride 86 10/27/2016 03:00 AM    CHOL/HDL Ratio 2.9 10/27/2016 03:00 AM     Lab Results   Component Value Date/Time    Glucose (POC) 111 (H) 02/17/2018 06:43 AM    Glucose (POC) 118 (H) 02/16/2018 09:16 PM    Glucose (POC) 134 (H) 02/16/2018 04:48 PM    Glucose (POC) 119 (H) 02/16/2018 12:22 PM Glucose (POC) 174 (H) 02/16/2018 09:45 AM    Glucose (POC) 192 (H) 02/16/2018 08:52 AM     Lab Results   Component Value Date/Time    Color YELLOW/STRAW 08/03/2017 05:59 AM    Appearance CLEAR 08/03/2017 05:59 AM    Specific gravity 1.014 08/03/2017 05:59 AM    pH (UA) 6.0 08/03/2017 05:59 AM    Protein NEGATIVE  08/03/2017 05:59 AM    Glucose NEGATIVE  08/03/2017 05:59 AM    Ketone NEGATIVE  08/03/2017 05:59 AM    Bilirubin NEGATIVE  08/03/2017 05:59 AM    Urobilinogen 0.2 08/03/2017 05:59 AM    Nitrites NEGATIVE  08/03/2017 05:59 AM    Leukocyte Esterase NEGATIVE  08/03/2017 05:59 AM    Epithelial cells FEW 08/03/2017 05:59 AM    Bacteria NEGATIVE  08/03/2017 05:59 AM    WBC 0-4 08/03/2017 05:59 AM    RBC 0-5 08/03/2017 05:59 AM       Medications Reviewed:     Current Facility-Administered Medications   Medication Dose Route Frequency    [START ON 2/18/2018] predniSONE (DELTASONE) tablet 60 mg  60 mg Oral DAILY WITH BREAKFAST    aspirin chewable tablet 81 mg  81 mg Oral DAILY    lisinopril (PRINIVIL, ZESTRIL) tablet 20 mg  20 mg Oral DAILY    metoprolol succinate (TOPROL-XL) XL tablet 50 mg  50 mg Oral DAILY    sodium chloride (NS) flush 5-10 mL  5-10 mL IntraVENous Q8H    sodium chloride (NS) flush 5-10 mL  5-10 mL IntraVENous PRN    budesonide (PULMICORT) 500 mcg/2 ml nebulizer suspension  500 mcg Nebulization BID RT    cefTRIAXone (ROCEPHIN) 1 g in 0.9% sodium chloride (MBP/ADV) 50 mL  1 g IntraVENous Q24H    azithromycin (ZITHROMAX) 500 mg in 0.9% sodium chloride (MBP/ADV) 250 mL  500 mg IntraVENous Q24H    morphine injection 0.5 mg  0.5 mg IntraVENous Q4H PRN    albuterol-ipratropium (DUO-NEB) 2.5 MG-0.5 MG/3 ML  3 mL Nebulization Q4H RT    pantoprazole (PROTONIX) tablet 40 mg  40 mg Oral DAILY    glucose chewable tablet 16 g  4 Tab Oral PRN    dextrose (D50W) injection syrg 12.5-25 g  12.5-25 g IntraVENous PRN    glucagon (GLUCAGEN) injection 1 mg  1 mg IntraMUSCular PRN    insulin lispro (HUMALOG) injection   SubCUTAneous AC&HS    arformoterol (BROVANA) neb solution 15 mcg  15 mcg Nebulization BID RT    And    budesonide (PULMICORT) 500 mcg/2 ml nebulizer suspension  500 mcg Nebulization BID RT     ______________________________________________________________________  EXPECTED LENGTH OF STAY: - - -  ACTUAL LENGTH OF STAY:          0                 Alonso Ivy MD

## 2018-02-17 NOTE — PROGRESS NOTES
Problem: Chronic Obstructive Pulmonary Disease (COPD)  Goal: *Absence of hypoxia  Outcome: Progressing Towards Goal  O2 Saturations decrease after activity.   Needs O2 increased to 3L

## 2018-02-17 NOTE — ROUTINE PROCESS
Primary Nurse Ashvin Cardona RN and Saeed Marks RN performed a dual skin assessment on this patient No impairment noted  Eric score is 21

## 2018-02-18 NOTE — PROGRESS NOTES
Problem: Chronic Obstructive Pulmonary Disease (COPD)  Goal: *Absence of hypoxia  Outcome: Progressing Towards Goal  Oxygen saturation above 92% on 2L nasal cannula, no s/s of distress. Will continue to monitor. 6728- Bedside and Verbal shift change report given to Fernando Harden (oncoming nurse) by Haile Castellon (offgoing nurse). Report included the following information SBAR, Kardex, Intake/Output, MAR, Recent Results and Cardiac Rhythm NSR.

## 2018-02-18 NOTE — PROGRESS NOTES
Day #1 of Levaquin  Indication:  COPD exacerbation  Current regimen:  500 mg by mouth every 24 hours  Abx regimen: Levaquin  Recent Labs      18   0431  18   1932  18   0335   18   0333   WBC  15.8*   --   16.0*   --   11.0   CREA  0.94  1.07*  1.01   < >  0.92   BUN  19  16  14   < >  9    < > = values in this interval not displayed. Est CrCl: 48.9 ml/min  Temp (24hrs), Av.1 °F (36.7 °C), Min:97.7 °F (36.5 °C), Max:98.5 °F (36.9 °C)    Cultures: none    Plan: Change to Levaquin 500 mg by mouth x 1 then levaquin 250 mg by mouth every 24 hours. per [address] [place] [address] P&T Committee Protocol with respect to renal function. Pharmacy will continue to monitor patient daily and will make dosage adjustments based upon changing renal function.     Thank you,  Evangeline Landau, PHARMD, BCPS

## 2018-02-18 NOTE — PROGRESS NOTES
Hospitalist Progress Note  Leti Renner MD  Answering service: [phone] from in house phone  Cell: [phone]      Date of Service:  2018  NAME:  Agueda Barger  :  1950  MRN:  [id number]      Admission Summary:   The patient is a 44-year-old female with past medical history of metastatic lung cancer, congestive heart failure, COPD on chronic oxygen at home, history of multiple thyroid nodules, tobacco use disorder, status post ICD placement, nonischemic cardiomyopathy who presents to the hospital with the above mentioned symptoms. Patient reports that about two days back, she had some blood work done and was found to be hyponatremic going in for IV infusion and was fine after that. Patient reports that yesterday she started having some wheezing associated with shortness of breath, cough and dyspnea on exertion. The symptoms progressively got worse. She had to increase her home oxygen at 3 liters, but that did not seem to help and she decided to come to the hospital.  The patient reports that the cough is somewhat productive of yellowish colored sputum. She also reports that she had some chills, but denies any fevers associated with her symptoms. Patient reports that she has history of lung cancer, had right lobectomy in  and left lobectomy in , currently on chemotherapy. Patient also reports that she was told that there is \"new cancer\" in her liver. I am not sure whether this was metastases versus primary liver cancer, though she reports that \"it is being the same chemotherapy. \"  Patient denies any other concerns or problems.   Denies any headache, blurry vision, sore throat, trouble swallowing, trouble with speech, any chest pain, abdominal pain, constipation, diarrhea, urinary symptoms, focal or generalized neurological weakness, recent travel, sick contacts, falls, injuries, hematemesis, melena, hemoptysis.     Interval history / Subjective: This AM patient stating she has severe, chronic back pain and continues with some SOB and wheezing. NA without improvement on labs. Assessment & Plan:     Shortness of breath, most likely secondary to chronic obstructive pulmonary disease exacerbation with underlying metastatic pulmonary malignancy    -continue PO steroids  -on IV antibiotics for now; transition to PO today  -continue DuoNebs, oxygen support, pulse ox monitoring   -CTA of the chest does not show any pulmonary embolism and/or any signs of pneumonia  -minimal wheezing on exam   -can likely go home tomorrow if  less SOB    Hyponatremia, recurrent, probably secondary to malignancy  -patient with dropping sodium to 122 this AM  -Nephrology was consulted yesterday AM  -sent urine electrolytes, gave IV lasix, and started fluid restriction  -strict Is/Os  -likely chronic, can leave tomorrow if sodium stable?  -Appreciate further recs    Hyperkalemia: K to 5.3 this AM  -If up-trends consider Kayexalate     Elevated liver enzymes/Tansaminitis  -likely secondary to metastatic disease.    -multiple masses seen on liver ultrasound    History of lung cancer  -patient is currently on chemotherapy  -very poor prognosis given clinical picture and masses in liver  -will need continued Oncology follow-up as an outpatient    History of congestive heart failure: currently does not appear to be in exacerbation    Lovenox for DVT ppx    Code status: Full    Care Plan discussed with: Patient/Family  Disposition: Home tomorrow? Hospital Problems  Date Reviewed: 2/16/2018          Codes Class Noted POA    * (Principal)SOB (shortness of breath) ICD-10-CM: R06.02  ICD-9-CM: 786.05  8/2/2017 Unknown              Review of Systems:   Pertinent items are noted in HPI. Vital Signs:    Last 24hrs VS reviewed since prior progress note.  Most recent are:  Visit Vitals    /78 (BP 1 Location: Right arm, BP Patient Position: Sitting)    Pulse 94    Temp 97.7 °F (36.5 °C)    Resp 18    Ht 5' 6\" (1.676 m)    Wt 51.9 kg (114 lb 6.4 oz)    SpO2 100%    Breastfeeding No    BMI 18.46 kg/m2         Intake/Output Summary (Last 24 hours) at 02/18/18 0831  Last data filed at 02/18/18 0254   Gross per 24 hour   Intake              290 ml   Output                0 ml   Net              290 ml        Physical Examination:             Constitutional:  Mild distress, cooperative, pleasant    ENT:  Neck supple   Resp:  Expiratory wheeze. Decreased sounds   CV:  Regular rhythm, normal rate, no murmurs    GI:  Soft, non distended, non tender    Musculoskeletal:  No edema    Neurologic:  Moves all extremities. AAOx3           Data Review:    Review and/or order of clinical lab test  Review and/or order of tests in the radiology section of [facility]      Labs:     Recent Labs      02/18/18 0431 02/17/18 0335   WBC  15.8*  16.0*   HGB  12.1  11.1*   HCT  35.4  32.5*   PLT  164  176     Recent Labs      02/18/18 0431 02/17/18   1932 02/17/18 0335 02/16/18 0333   NA  122*  123*  123*   < >  122*   K  5.3*  5.2*  5.4*   < >  6.4*   CL  88*  88*  89*   < >  86*   CO2  23  21  24   < >  27   BUN  19  16  14   < >  9   CREA  0.94  1.07*  1.01   < >  0.92   GLU  70  93  91   < >  71   CA  9.5  8.8  9.4   < >  9.5   MG  1.6   --    --    --   1.7   PHOS  2.1*   --    --    --   2.9    < > = values in this interval not displayed.      Recent Labs      02/18/18 0431 02/16/18 0333   SGOT  133*  188*   ALT  186*  227*   AP  210*  212*   TBILI  0.6  0.9   TP  6.3*  6.6   ALB  2.8*  2.8*   GLOB  3.5  3.8     Recent Labs      02/16/18 0428   INR  1.0   PTP  10.4   APTT  26.3      Recent Labs      02/16/18   0333   CPK  187   CKNDX  1.3   TROIQ  <0.04     Lab Results   Component Value Date/Time    Cholesterol, total 178 10/27/2016 03:00 AM    HDL Cholesterol 61 10/27/2016 03:00 AM    LDL, calculated 99.8 10/27/2016 03:00 AM    Triglyceride 86 10/27/2016 03:00 AM    CHOL/HDL Ratio 2.9 10/27/2016 03:00 AM     Lab Results   Component Value Date/Time    Glucose (POC) 100 02/18/2018 06:04 AM    Glucose (POC) 93 02/17/2018 08:53 PM    Glucose (POC) 172 (H) 02/17/2018 04:43 PM    Glucose (POC) 116 (H) 02/17/2018 12:30 PM    Glucose (POC) 111 (H) 02/17/2018 06:43 AM     Lab Results   Component Value Date/Time    Color YELLOW/STRAW 02/17/2018 12:31 PM    Appearance CLEAR 02/17/2018 12:31 PM    Specific gravity 1.029 02/17/2018 12:31 PM    pH (UA) 6.0 02/17/2018 12:31 PM    Protein TRACE (A) 02/17/2018 12:31 PM    Glucose NEGATIVE  02/17/2018 12:31 PM    Ketone NEGATIVE  02/17/2018 12:31 PM    Bilirubin NEGATIVE  08/03/2017 05:59 AM    Urobilinogen 0.2 02/17/2018 12:31 PM    Nitrites NEGATIVE  02/17/2018 12:31 PM    Leukocyte Esterase NEGATIVE  02/17/2018 12:31 PM    Epithelial cells FEW 02/17/2018 12:31 PM    Bacteria NEGATIVE  02/17/2018 12:31 PM    WBC 0-4 02/17/2018 12:31 PM    RBC 5-10 02/17/2018 12:31 PM       Medications Reviewed:     Current Facility-Administered Medications   Medication Dose Route Frequency    predniSONE (DELTASONE) tablet 60 mg  60 mg Oral DAILY WITH BREAKFAST    enoxaparin (LOVENOX) injection 40 mg  40 mg SubCUTAneous Q24H    aspirin chewable tablet 81 mg  81 mg Oral DAILY    metoprolol succinate (TOPROL-XL) XL tablet 50 mg  50 mg Oral DAILY    sodium chloride (NS) flush 5-10 mL  5-10 mL IntraVENous Q8H    sodium chloride (NS) flush 5-10 mL  5-10 mL IntraVENous PRN    cefTRIAXone (ROCEPHIN) 1 g in 0.9% sodium chloride (MBP/ADV) 50 mL  1 g IntraVENous Q24H    azithromycin (ZITHROMAX) 500 mg in 0.9% sodium chloride (MBP/ADV) 250 mL  500 mg IntraVENous Q24H    morphine injection 0.5 mg  0.5 mg IntraVENous Q4H PRN    albuterol-ipratropium (DUO-NEB) 2.5 MG-0.5 MG/3 ML  3 mL Nebulization Q4H RT    pantoprazole (PROTONIX) tablet 40 mg  40 mg Oral DAILY    glucose chewable tablet 16 g  4 Tab Oral PRN    dextrose (D50W) injection syrg 12.5-25 g  12.5-25 g IntraVENous PRN    glucagon (GLUCAGEN) injection 1 mg  1 mg IntraMUSCular PRN    insulin lispro (HUMALOG) injection   SubCUTAneous AC&HS    arformoterol (BROVANA) neb solution 15 mcg  15 mcg Nebulization BID RT    And    budesonide (PULMICORT) 500 mcg/2 ml nebulizer suspension  500 mcg Nebulization BID RT     ______________________________________________________________________  EXPECTED LENGTH OF STAY: - - -  ACTUAL LENGTH OF STAY:          0                 Leti Renner MD

## 2018-02-19 NOTE — PHYSICIAN ADVISORY
Letter of Status Determination:   Recommend hospitalization status upgraded from   OBSERVATION  to INPATIENT  Status     Pt Name:  Caridad Dockery   MR#   [address] [facility] # [id number] /  [id number]   [facility]#  [id number]   [address] [place] [address]  265/37  @ [facility]   Hospitalization date  2/16/2018  3:19 AM   Current Attending Physician  Lovie Closs, MD   Principal diagnosis  SOB (shortness of breath)      Clinicals  74 y.o. y.o  female hospitalized with above diagnosis   The pt suffers from complex medical issues including metastatic lung cancer, COPD, CHF, chronic respiratory failure needing hmoe oxygen therapy etc.         Lili Preciado ([facility]) criteria   Does  NOT apply    STATUS DETERMINATION  This patient is at above high risk of deterioration based on documented presenting clinical data, comorbid conditions, high risk of adverse events and current acute care course. Ms. Caridad Dockery now meets Inpatient Admission status criteria in accordance with CMS regulation Section 43 .3. Specifically, due to medical necessity the patient's stay now exceeds Two Midnights. It is our recommendation that this patient's hospitalization status should be upgraded from  OBSERVATION to INPATIENT status. The final decision of the patient's hospitalization status depends on the attending physician's judgment              Additional comments     Payor: Loreta Human / Plan: [facility] / Product Type: Medicare /         Pawan Vicente MD MPH FACP     Physician [address]   [employer], [address] [place] [address]    Cell  [phone]        [phone]    .

## 2018-02-19 NOTE — PROGRESS NOTES
Met with patient at bedside to introduce role and to assess for any need/concerns regarding discharge. Patient admitted from home with complaints of shortness of breath. She has a history of metastatic lung cancer and is oxygen dependent at home. She lives with her  and at baseline is independent with her ADLs. Her oxygen is provided by [employer] along with her nebulizer. Patient currently being treated with chemotherapy. Will  follow and assist with her disposition. Care Management Interventions  PCP Verified by CM:  Yes (Dr Wilian Brooks)  Palliative Care Criteria Met (RRAT>21 & CHF Dx)?: No  Mode of Transport at Discharge:  ( car)  Transition of Care Consult (CM Consult): Discharge Planning  Current Support Network: Lives with Spouse, Own Home  Confirm Follow Up Transport: Family  Plan discussed with Pt/Family/Caregiver: Yes   Resource Information Provided?:  (NA)  Discharge Location  Discharge Placement: [address] [place] [address]  Ext [phone]

## 2018-02-19 NOTE — PROGRESS NOTES
Problem: Chronic Obstructive Pulmonary Disease (COPD)  Goal: *Oxygen saturation during activity within specified parameters  Outcome: Progressing Towards Goal  O2 sats remained above 92% during the shift.

## 2018-02-19 NOTE — TELEPHONE ENCOUNTER
CONSULT:    Patient:  Bette Gao    : [birth year]    Referring Physician:  Dr. Glenn Salcido     Reason for Consult:  Active cancer/ Lung CA w/ mets to liver    Room: #411, Bed A

## 2018-02-19 NOTE — PROGRESS NOTES
Hospitalist Progress Note  Trace Marquez MD  Answering service: [phone] from in house phone  Cell: [phone]      Date of Service:  2018  NAME:  Kaylan Britton  :  1950  MRN:  [id number]      Admission Summary:   The patient is a 29-year-old female with past medical history of metastatic lung cancer, congestive heart failure, COPD on chronic oxygen at home, history of multiple thyroid nodules, tobacco use disorder, status post ICD placement, nonischemic cardiomyopathy who presents to the hospital with the above mentioned symptoms. Patient reports that about two days back, she had some blood work done and was found to be hyponatremic going in for IV infusion and was fine after that. Patient reports that yesterday she started having some wheezing associated with shortness of breath, cough and dyspnea on exertion. The symptoms progressively got worse. She had to increase her home oxygen at 3 liters, but that did not seem to help and she decided to come to the hospital.  The patient reports that the cough is somewhat productive of yellowish colored sputum. She also reports that she had some chills, but denies any fevers associated with her symptoms. Patient reports that she has history of lung cancer, had right lobectomy in  and left lobectomy in , currently on chemotherapy. Patient also reports that she was told that there is \"new cancer\" in her liver. I am not sure whether this was metastases versus primary liver cancer, though she reports that \"it is being the same chemotherapy. \"  Patient denies any other concerns or problems.   Denies any headache, blurry vision, sore throat, trouble swallowing, trouble with speech, any chest pain, abdominal pain, constipation, diarrhea, urinary symptoms, focal or generalized neurological weakness, recent travel, sick contacts, falls, injuries, hematemesis, melena, hemoptysis.     Interval history / Subjective: This AM patient stating her back pain has resolved, but now she is having abdominal pain that she has also had intermittently in the past that she thinks may be related to her \"liver masses. \" Continues to feel SOB and not quite at baseline. Daughter at the bedside. Assessment & Plan:     Shortness of breath, most likely secondary to chronic obstructive pulmonary disease exacerbation with underlying metastatic pulmonary malignancy    -continue PO steroids  -continue PO antibiotics  -continue DuoNebs, oxygen support, pulse ox monitoring   -CTA of the chest does not show any pulmonary embolism and/or any signs of pneumonia  -minimal wheezing on exam   -concern for worsening clinical status and progression of her malignancy at this point  -will consult [facility] group to see her while inpatient to weight in. Patient and daughter concerned if this is all a reaction to her recent immunotherapy    Hyponatremia, recurrent, probably secondary to malignancy  -patient with dropping sodium to 121 this AM  -likely a chronic issue given her cancer and SIADH  -Nephrology was consulted  -sent urine electrolytes, gave IV lasix, and started fluid restriction  -strict Is/Os  -Not improving;appreciate recs    Hyperkalemia: K to 5.6 this AM  -gave Kayexalate   -re-check this afternoon    Abdominal Pain: Midline, upper abdominal pain on exam. Check lactic acid and KUB. -?likely secondary to her metastatic disease  -UPDATE: KUB without any acute findings. Lactic acid came back above 6, but is likely type B lactic acidosis due to her malignancy. Will order CT A/P since patient is complaining of abdominal pain, but it is likely due to her metastatic disease  -Gentle fluids given her HF.  Will re-check and Oncology to see while inpatient    Elevated liver enzymes/Transaminitis  -likely secondary to metastatic disease  -multiple masses seen on liver ultrasound    History of lung cancer  -patient is currently on immunotherapy  -very poor prognosis given clinical picture and masses in liver  -consult to Onc, as above    History of congestive heart failure: currently does not appear to be in exacerbation    Lovenox for DVT ppx    Code status: Full    Care Plan discussed with: Patient/Family  Disposition: Will have Oncology see her today and assess. If this is progression of her disease, will she be able to tolerate further immunotherapy? Does Palliative need to get involved? Hospital Problems  Date Reviewed: 2/16/2018          Codes Class Noted POA    * (Principal)SOB (shortness of breath) ICD-10-CM: R06.02  ICD-9-CM: 786.05  8/2/2017 Unknown              Review of Systems:   Pertinent items are noted in HPI. Vital Signs:    Last 24hrs VS reviewed since prior progress note. Most recent are:  Visit Vitals    /83 (BP 1 Location: Right arm, BP Patient Position: At rest)    Pulse 98    Temp 98.1 °F (36.7 °C)    Resp 20    Ht 5' 6\" (1.676 m)    Wt 52 kg (114 lb 10.2 oz)    SpO2 99%    Breastfeeding No    BMI 18.5 kg/m2         Intake/Output Summary (Last 24 hours) at 02/19/18 0292  Last data filed at 02/19/18 0400   Gross per 24 hour   Intake              480 ml   Output                0 ml   Net              480 ml        Physical Examination:             Constitutional:  Mild distress, cooperative, pleasant    ENT:  Neck supple   Resp:  E Decreased sounds   CV:  Regular rhythm, normal rate, no murmurs    GI:  Soft, tender in midline abdomen. Normal bowel sounds. No rigidity    Musculoskeletal:  No edema    Neurologic:  Moves all extremities.   AAOx3           Data Review:    Review and/or order of clinical lab test  Review and/or order of tests in the radiology section of CPT      Labs:     Recent Labs      02/19/18   0406  02/18/18   0431   WBC  14.9*  15.8*   HGB  11.8  12.1   HCT  34.4*  35.4   PLT  127*  164     Recent Labs      02/19/18   0406  02/18/18   0431 02/17/18   1932   NA  121*  122*  123*   K  5.6*  5.3*  5.2*   CL  86*  88*  88*   CO2  25  23  21   BUN  23*  19  16   CREA  1.14*  0.94  1.07*   GLU  78  70  93   CA  9.3  9.5  8.8   MG  1.6  1.6   --    PHOS   --   2.1*   --      Recent Labs      02/18/18   0431   SGOT  133*   ALT  186*   AP  210*   TBILI  0.6   TP  6.3*   ALB  2.8*   GLOB  3.5       Lab Results   Component Value Date/Time    Cholesterol, total 178 10/27/2016 03:00 AM    HDL Cholesterol 61 10/27/2016 03:00 AM    LDL, calculated 99.8 10/27/2016 03:00 AM    Triglyceride 86 10/27/2016 03:00 AM    CHOL/HDL Ratio 2.9 10/27/2016 03:00 AM     Lab Results   Component Value Date/Time    Glucose (POC) 90 02/19/2018 06:47 AM    Glucose (POC) 111 (H) 02/18/2018 04:49 PM    Glucose (POC) 88 02/18/2018 11:45 AM    Glucose (POC) 100 02/18/2018 06:04 AM    Glucose (POC) 93 02/17/2018 08:53 PM     Lab Results   Component Value Date/Time    Color YELLOW/STRAW 02/17/2018 12:31 PM    Appearance CLEAR 02/17/2018 12:31 PM    Specific gravity 1.029 02/17/2018 12:31 PM    pH (UA) 6.0 02/17/2018 12:31 PM    Protein TRACE (A) 02/17/2018 12:31 PM    Glucose NEGATIVE  02/17/2018 12:31 PM    Ketone NEGATIVE  02/17/2018 12:31 PM    Bilirubin NEGATIVE  08/03/2017 05:59 AM    Urobilinogen 0.2 02/17/2018 12:31 PM    Nitrites NEGATIVE  02/17/2018 12:31 PM    Leukocyte Esterase NEGATIVE  02/17/2018 12:31 PM    Epithelial cells FEW 02/17/2018 12:31 PM    Bacteria NEGATIVE  02/17/2018 12:31 PM    WBC 0-4 02/17/2018 12:31 PM    RBC 5-10 02/17/2018 12:31 PM       Medications Reviewed:     Current Facility-Administered Medications   Medication Dose Route Frequency    sodium polystyrene (KAYEXALATE) 15 gram/60 mL oral suspension 15 g  15 g Oral ONCE    levoFLOXacin (LEVAQUIN) tablet 250 mg  250 mg Oral Q24H    oxyCODONE IR (ROXICODONE) tablet 5 mg  5 mg Oral Q4H PRN    albuterol-ipratropium (DUO-NEB) 2.5 MG-0.5 MG/3 ML  3 mL Nebulization Q6H RT    predniSONE (DELTASONE) tablet 60 mg  60 mg Oral DAILY WITH BREAKFAST    enoxaparin (LOVENOX) injection 40 mg  40 mg SubCUTAneous Q24H    aspirin chewable tablet 81 mg  81 mg Oral DAILY    metoprolol succinate (TOPROL-XL) XL tablet 50 mg  50 mg Oral DAILY    sodium chloride (NS) flush 5-10 mL  5-10 mL IntraVENous Q8H    sodium chloride (NS) flush 5-10 mL  5-10 mL IntraVENous PRN    pantoprazole (PROTONIX) tablet 40 mg  40 mg Oral DAILY    glucose chewable tablet 16 g  4 Tab Oral PRN    dextrose (D50W) injection syrg 12.5-25 g  12.5-25 g IntraVENous PRN    glucagon (GLUCAGEN) injection 1 mg  1 mg IntraMUSCular PRN    insulin lispro (HUMALOG) injection   SubCUTAneous AC&HS    arformoterol (BROVANA) neb solution 15 mcg  15 mcg Nebulization BID RT    And    budesonide (PULMICORT) 500 mcg/2 ml nebulizer suspension  500 mcg Nebulization BID RT     ______________________________________________________________________  EXPECTED LENGTH OF STAY: - - -  ACTUAL LENGTH OF STAY:          0                 Marva Nuñez MD

## 2018-02-19 NOTE — PROGRESS NOTES
Problem: Falls - Risk of  Goal: *Absence of Falls  Document Fay Fall Risk and appropriate interventions in the flowsheet. Outcome: Progressing Towards Goal  Fall Risk Interventions:            Medication Interventions: Assess postural VS orthostatic hypotension, Bed/chair exit alarm, Evaluate medications/consider consulting pharmacy    Elimination Interventions: Call light in reach             Comments: Patient had appropriate safety awareness and was up ad becky without a fall.

## 2018-02-19 NOTE — PROGRESS NOTES
Problem: Chronic Obstructive Pulmonary Disease (COPD)  Goal: *Absence of hypoxia  Outcome: Progressing Towards Goal  VSS. Pt is alert and oriented x2. Respiratory pattern regular. Pt on 2L NC sat's remaining above 95%. Position changes done when needed. Problem: Falls - Risk of  Goal: *Absence of Falls  Document Fay Fall Risk and appropriate interventions in the flowsheet. Outcome: Progressing Towards Goal  Fall Risk Interventions:            Medication Interventions: Patient to call before getting OOB, Teach patient to arise slowly    Elimination Interventions: Call light in reach, Patient to call for help with toileting needs       Bed locked and in low position. Gripper socks on. Call light and personal belongings within reach. 1240: Notified Dr. Dory Mac of Lactic acid of 6.2 Orders received for fluids, CT of abdomen, and to recheck lactic acid after fluids and CT are done. 1700: Spoke with Dr Dory Mac. Asked if she wanted me to redraw lactic. Per physician wait to redraw lactic until after CT.     2000: Bedside shift change report given to Ale Schultz RN (oncoming nurse) by Ngoc Ornelas RN (offgoing nurse). Report included the following information SBAR, Kardex, Intake/Output, MAR, Recent Results and Cardiac Rhythm NSR.

## 2018-02-19 NOTE — PROGRESS NOTES
Problem: Chronic Obstructive Pulmonary Disease (COPD)  Goal: *Optimize nutritional status  Outcome: Progressing Towards Goal  Patient was encouraged to eat her meals and offered supplemental snacks.

## 2018-02-19 NOTE — PROGRESS NOTES
Problem: Chronic Obstructive Pulmonary Disease (COPD)  Goal: *Oxygen saturation during activity within specified parameters  Outcome: Progressing Towards Goal  Breathing pattern within defined limits. Respirations regular and unlabored. 02 saturations remain in the 90s at rest.  Patient is acyanotic. Pt demonstrates optimal breathing technique with encouragement. Goal: *Absence of hypoxia  Outcome: Progressing Towards Goal  Respiratory status assess, vitals, and mental status assessed Q4 hours. Frequent turning and repositioning as well as elevation of H.O.B. Encouraged. Incentive spirometry encouraged with every assessment. Pt verbalizes understanding of plan. Problem: Falls - Risk of  Goal: *Absence of Falls  Document Fay Fall Risk and appropriate interventions in the flowsheet.    Outcome: Progressing Towards Goal  Fall Risk Interventions:            Medication Interventions: Assess postural VS orthostatic hypotension, Evaluate medications/consider consulting pharmacy, Teach patient to arise slowly    Elimination Interventions: Call light in reach, Toilet paper/wipes in reach

## 2018-02-20 NOTE — CDMP QUERY
#3    Please clarify if this patient is (was) being treated/managed for:     => Chronic Respiratory Failure in the setting of COPD requiring continued supplemental oxygen   => Other explanation of clinical findings  => Unable to determine (no explanation for clinical findings)    The medical record reflects the following clinical findings, treatment, and risk factors. Risk Factors:  COPD  Clinical Indicators:  Patient has a history of lung cancer and COPD. Patient is on 2L of O2 at baseline, but raised it to 3 L for comfort. Treatment: supplemental oxygen     Please clarify and document your clinical opinion in the progress notes and discharge summary including the definitive and/or presumptive diagnosis, (suspected or probable), related to the above clinical findings. Please include clinical findings supporting your diagnosis.     Thank you,         Nancy Gregg [facility]

## 2018-02-20 NOTE — CDMP QUERY
#4  Patient is noted to have a BMI of 18.68 kg/m 2  Please clarify if this patient is:     =>Underweight  =>Cachexia  =>Failure to Thrive  =>Other explanation of clinical findings  =>Unable to determine (no explanation for clinical findings)    Presentation:   Ht: 5' 6\" (1.676 m)  Wt: 52.5 kg (115 lb 11.9 oz        Please clarify and document your clinical opinion in the progress notes and discharge summary, including the definitive and or presumptive diagnosis, (suspected or probable), related to the above clinical findings. Please include clinical findings supporting your diagnosis.      Thank you,         Siddhartha Perez [address] [place] [address]

## 2018-02-20 NOTE — CDMP QUERY
#2    Please clarify if this patient is (was) being treated/managed for:     => Lactic Acidosis in the setting of abnormal labs/metastatic CA  requiring IV fluids/lab monitoring   => Other explanation of clinical findings  => Unable to determine (no explanation for clinical findings)    The medical record reflects the following clinical findings, treatment, and risk factors. Risk Factors:  lung CA/sob  Clinical Indicators:  lactic acid 6.2  Treatment: IV fluids/lab monitoring     Please clarify and document your clinical opinion in the progress notes and discharge summary including the definitive and/or presumptive diagnosis, (suspected or probable), related to the above clinical findings. Please include clinical findings supporting your diagnosis.     Thank you,         Nancy Gregg [facility]

## 2018-02-20 NOTE — PROGRESS NOTES
Problem: Patient Education: Go to Patient Education Activity  Goal: Patient/Family Education  Assessed and moved furniture in the room to clear pathway to the restroom, call light within reach, lower bed position. Also reminded pt to call for assistance before get up to the bathroom     2236- critical result lactic 6.9, called Dr. Griselda Martinez and received order to repeat it in 4 hours. 0502 - received critical lactic 4.9, decreased from previous result and did not call MD per protocol.

## 2018-02-20 NOTE — CDMP QUERY
#1    Please clarify if this patient is (was) being treated/managed for:     => Chronic Systolic CHF in the setting of known history requiring lasix  => Other explanation of clinical findings  => Unable to determine (no explanation for clinical findings)    The medical record reflects the following clinical findings, treatment, and risk factors. Risk Factors:  HX of CHF  Clinical Indicators:  H/P--congestive heart failure, currently does not appear to be in exacerbation  Treatment: lasix    Please clarify and document your clinical opinion in the progress notes and discharge summary including the definitive and/or presumptive diagnosis, (suspected or probable), related to the above clinical findings. Please include clinical findings supporting your diagnosis.     Thank you,         Nancy Gregg On license of [facility]

## 2018-02-20 NOTE — CONSULTS
73 y/o woman with lung cancer is seen for consultation   After admission for sob  Pt.  Examined, chart reviewed, d/w family  Dictated  Kevin Payton MD

## 2018-02-20 NOTE — CONSULTS
[address]    Name:MILAD WORLEY  MR#: [id number]  : 1950  ACCOUNT #: [de-identified]   DATE OF SERVICE: 2018    HISTORY OF PRESENT ILLNESS:  The patient is a 62-year-old woman with a history of metastatic lung cancer who was admitted to [facility] on 2018 for increasing shortness of breath. We were asked to see her regarding her lung cancer status. This patient has a complicated medical history, having first been diagnosed with lung cancer back in . She has been treated with chemotherapy in the past as well as radiation. She is more recently being treated with immunotherapy, having received 1 dose of therapy 3 weeks ago. She notes progressive shortness of breath leading up to this admission, not associated with hemoptysis or fever. She has had a cough with some yellowish colored sputum, however. 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 She does not feel much better since her admission. She is still short of breath with exertion and using oxygen around the clock. Over the past 48 hours, she has developed right flank discomfort migrating to the right upper quadrant, not associated with nausea, vomiting or bleeding. 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 She had documented recurrence in 2017 staged at T1bN2 stage III and was given carboplatin and Taxol followed by consolidative stereotactic radiation therapy to the right upper lung lesion completed in 10/2017 at the [facility].  She was diagnosed with recurrent disease in January with lung metastases and was started on Opdivo 3 weeks ago and has received 1 treatment.     PAST MEDICAL HISTORY:  Also included severe COPD, home oxygen dependent, osteoporosis, cholelithiasis, thyroid nodules, nonischemic cardiomyopathy. PAST SURGICAL HISTORY:  Includes the left lower lobe lobectomy and the right lung wedge resection in 2007. She has had several bronchoscopies. She has had a defibrillator placed, cholecystectomy, tubal ligation, shoulder surgery and hysterectomy. SOCIAL HISTORY:  Her  was in the [employer]. She is a former smoker, 1 pack per day x40 years, quitting in 02/2017. No history of alcohol abuse. ALLERGIES:  No known drug allergies. FAMILY HISTORY:  Noncontributory. REVIEW OF SYSTEMS:  As noted above, otherwise noncontributory. PHYSICAL EXAMINATION:  GENERAL:  She is a chronically ill-appearing woman in some discomfort, wearing nasal cannula oxygen. VITAL SIGNS:  Her temp is 97, pulse 114, /77, respirations 30 on 2 liters O2. She has a pulse ox of 99%. HEENT:  EOMI. Nasal cannula oxygen in place. NECK:  Supple. LUNGS:  Clear anteriorly. CARDIAC:  Regular rate and rhythm. No rub. ABDOMEN:  Soft. She has right upper quadrant tenderness without rebound. No hepatomegaly or masses felt. She has mild ascites noted. EXTREMITIES:  No clubbing, cyanosis or edema. NEUROLOGIC:  AO x3, nonfocal.    LABORATORY DATA:  Her sodium is 124. BUN and creatinine are normal.  Liver enzymes are abnormal, elevated SGPT at 260, , alk phos 223. Bilirubin is normal.  CBC:  White count is 18, hemoglobin 12, platelet count 498. IMPRESSION:    1. Metastatic non-small cell lung cancer. This patient was just started on Opdivo 3 weeks ago. She missed her second dose 1 week ago. She did not have any obvious toxicities from her first course of treatment. A CT scan of the abdomen and pelvis was performed yesterday and compared to a January PET scan and demonstrates disease progression in the liver.   However, this study was done too soon after the beginning of Opdivo to make a formal decision regarding her disease status, given the fact that she could have had some of this progression before her first dose of treatment, as well as the fact that it can take several doses of Opdivo before response is seen. Unfortunately, we cannot give Opdivo to inpatients. We will have to wait until she is well enough to leave the hospital to give her, her next dose. 2.  Right upper quadrant pain. This occurred rather suddenly over the weekend and is atypical for tumor-related pain. She is being treated with p.r.n. narcotics right now and has had improvement, but is still uncomfortable. I would suggest performing an ultrasound-guided paracentesis to analyze her ascites and see if there are malignant cells present or if there is infection. We may need to ask surgery for an opinion as well if this pain worsens. 3.  Hyponatremia. This was thought to be due to SIADH. She is on fluid restriction ordered by nephrology, and they are managing this problem. We will follow along with you.        On behalf of MD KATERIN Goldman / Tommy Cyr  D: 02/20/2018 10:46     T: 02/20/2018 11:13  JOB #: [id number]

## 2018-02-20 NOTE — PROGRESS NOTES
Hospitalist Progress Note        Date of Service:  2018  NAME:  Rick Álvarez  :  1950  MRN:  [id number]      Admission Summary:   The patient is a 59-year-old female with past medical history of metastatic lung cancer, congestive heart failure, COPD on chronic oxygen at home, history of multiple thyroid nodules, tobacco use disorder, status post ICD placement, nonischemic cardiomyopathy who presents to the hospital with the above mentioned symptoms. Patient reports that about two days back, she had some blood work done and was found to be hyponatremic going in for IV infusion and was fine after that. Patient reports that yesterday she started having some wheezing associated with shortness of breath, cough and dyspnea on exertion. The symptoms progressively got worse. She had to increase her home oxygen at 3 liters, but that did not seem to help and she decided to come to the hospital.  The patient reports that the cough is somewhat productive of yellowish colored sputum. She also reports that she had some chills, but denies any fevers associated with her symptoms. Patient reports that she has history of lung cancer, had right lobectomy in  and left lobectomy in , currently on chemotherapy. Patient also reports that she was told that there is \"new cancer\" in her liver. I am not sure whether this was metastases versus primary liver cancer, though she reports that \"it is being the same chemotherapy. \"  Patient denies any other concerns or problems.   Denies any headache, blurry vision, sore throat, trouble swallowing, trouble with speech, any chest pain, abdominal pain, constipation, diarrhea, urinary symptoms, focal or generalized neurological weakness, recent travel, sick contacts, falls, injuries, hematemesis, melena, hemoptysis.     Interval history / Subjective:     C/o severe right upper quadrant abdominal pain, associated with nausea, no vomiting, no constipation or diarrhea, dyspneic because she can't take deep breaths due to the pain being a bit pleuritic, has a mild dry cough, no edema. Assessment & Plan:     Shortness of breath, most likely secondary to chronic obstructive pulmonary disease exacerbation with underlying metastatic pulmonary malignancy    -continue PO steroids; reduce dose to 40 mg  -continue PO antibiotics  -continue DuoNebs, oxygen support, pulse ox monitoring   -CTA of the chest does not show any pulmonary embolism and/or any signs of pneumonia  -minimal wheezing on exam    Abdominal Pain: RUQ pain worsened overnight,CT abd showed complex ascites as well as a pelvic stone with no site of perforation identified, as well as interval progression of hepatic mets  -add IV Dilaudid 1 mg q3h PRN  -suspect the pain is due to liver mets, though the rapid progression of pain goes against this  -lactic acid is elevated however in the setting of metastatic cancer and liver involvement, this is not a surprising finding. She does not appear infected clinically.  Leukocytosis could be explained by steroids  -consult palliative care to help with pain control as well as goals of care discussion, she is agreeable to this consutlation    Hyponatremia, recurrent, probably secondary to malignancy  -likely a chronic issue given her cancer and SIADH  -Nephrology was consulted  -strict Is/Os    Hyperkalemia:   -s/p gave Kayexalate with improvement    Elevated liver enzymes/Transaminitis  -likely secondary to metastatic disease    History of lung cancer  -patient is currently on immunotherapy  -very poor prognosis given clinical picture and masses in liver  -consult to Onc    History of congestive heart failure: currently does not appear to be in exacerbation    Lovenox for DVT ppx    Code status: Full    Care Plan discussed with: Patient/Family and Nurse  Disposition: TBD     Hospital Problems  Date Reviewed: 2/16/2018 Codes Class Noted POA    * (Principal)SOB (shortness of breath) ICD-10-CM: R06.02  ICD-9-CM: 786.05  8/2/2017 Unknown              Review of Systems:   Pertinent items are noted in HPI. Vital Signs:    Last 24hrs VS reviewed since prior progress note.  Most recent are:  Visit Vitals    /77 (BP 1 Location: Right arm, BP Patient Position: At rest)    Pulse (!) 114    Temp 97.6 °F (36.4 °C)    Resp 30    Ht 5' 6\" (1.676 m)    Wt 52.5 kg (115 lb 11.9 oz)    SpO2 98%    Breastfeeding No    BMI 18.68 kg/m2       No intake or output data in the 24 hours ending 02/20/18 0901     Physical Examination:             Constitutional:  appears in mild distress    ENT:  anicteric sclerae, normal conjunctiva, MMM, neck supple   Resp:  diminished breath sounds globally, normal work of breathing, tachypneic taking shallow respirations due to pain   CV:  Regular rhythm, tachycardic, no MRG, no peripheral edema    GI:  Soft, moderate tenderness of the RUQ and epigastrium, bs+    Musculoskeletal:  No edema  Skin: no rash seen    Neurologic/psyc:  CN grossly intact, normal speech, moves all extremities, appears uncomfortable but not anxious nor agitated           Data Review:    Review and/or order of clinical lab test  Review and/or order of tests in the radiology section of CPT      Labs:     Recent Labs      02/20/18   0344  02/19/18   0406   WBC  18.5*  14.9*   HGB  12.3  11.8   HCT  35.9  34.4*   PLT  119*  127*     Recent Labs      02/20/18   0344  02/19/18   1200  02/19/18   0406  02/18/18   0431   NA  124*  125*  121*  122*   K  4.9  5.3*  5.6*  5.3*   CL  88*  89*  86*  88*   CO2  23  23  25  23   BUN  23*  22*  23*  19   CREA  0.98  1.10*  1.14*  0.94   GLU  69  100  78  70   CA  9.2  9.1  9.3  9.5   MG  1.7   --   1.6  1.6   PHOS   --    --    --   2.1*     Recent Labs      02/20/18   0344  02/18/18   0431   SGOT  290*  133*   ALT  260*  186*   AP  223*  210*   TBILI  0.8  0.6   TP  6.3*  6.3*   ALB 3. 0*  2.8*   GLOB  3.3  3.5       Lab Results   Component Value Date/Time    Cholesterol, total 178 10/27/2016 03:00 AM    HDL Cholesterol 61 10/27/2016 03:00 AM    LDL, calculated 99.8 10/27/2016 03:00 AM    Triglyceride 86 10/27/2016 03:00 AM    CHOL/HDL Ratio 2.9 10/27/2016 03:00 AM     Lab Results   Component Value Date/Time    Glucose (POC) 83 02/20/2018 06:54 AM    Glucose (POC) 119 (H) 02/19/2018 09:07 PM    Glucose (POC) 208 (H) 02/19/2018 04:49 PM    Glucose (POC) 106 (H) 02/19/2018 11:46 AM    Glucose (POC) 90 02/19/2018 06:47 AM     Lab Results   Component Value Date/Time    Color YELLOW/STRAW 02/17/2018 12:31 PM    Appearance CLEAR 02/17/2018 12:31 PM    Specific gravity 1.029 02/17/2018 12:31 PM    pH (UA) 6.0 02/17/2018 12:31 PM    Protein TRACE (A) 02/17/2018 12:31 PM    Glucose NEGATIVE  02/17/2018 12:31 PM    Ketone NEGATIVE  02/17/2018 12:31 PM    Bilirubin NEGATIVE  08/03/2017 05:59 AM    Urobilinogen 0.2 02/17/2018 12:31 PM    Nitrites NEGATIVE  02/17/2018 12:31 PM    Leukocyte Esterase NEGATIVE  02/17/2018 12:31 PM    Epithelial cells FEW 02/17/2018 12:31 PM    Bacteria NEGATIVE  02/17/2018 12:31 PM    WBC 0-4 02/17/2018 12:31 PM    RBC 5-10 02/17/2018 12:31 PM       Medications Reviewed:     Current Facility-Administered Medications   Medication Dose Route Frequency    albuterol (PROVENTIL VENTOLIN) nebulizer solution 2.5 mg  2.5 mg Nebulization Q6H PRN    morphine injection 2 mg  2 mg IntraVENous Q4H PRN    HYDROmorphone (PF) (DILAUDID) injection 1 mg  1 mg IntraVENous Q3H PRN    0.9% sodium chloride infusion  1,000 mL/hr IntraVENous CONTINUOUS    levoFLOXacin (LEVAQUIN) tablet 250 mg  250 mg Oral Q24H    oxyCODONE IR (ROXICODONE) tablet 5 mg  5 mg Oral Q4H PRN    albuterol-ipratropium (DUO-NEB) 2.5 MG-0.5 MG/3 ML  3 mL Nebulization Q6H RT    predniSONE (DELTASONE) tablet 60 mg  60 mg Oral DAILY WITH BREAKFAST    enoxaparin (LOVENOX) injection 40 mg  40 mg SubCUTAneous Q24H    aspirin chewable tablet 81 mg  81 mg Oral DAILY    metoprolol succinate (TOPROL-XL) XL tablet 50 mg  50 mg Oral DAILY    sodium chloride (NS) flush 5-10 mL  5-10 mL IntraVENous Q8H    sodium chloride (NS) flush 5-10 mL  5-10 mL IntraVENous PRN    pantoprazole (PROTONIX) tablet 40 mg  40 mg Oral DAILY    glucose chewable tablet 16 g  4 Tab Oral PRN    dextrose (D50W) injection syrg 12.5-25 g  12.5-25 g IntraVENous PRN    glucagon (GLUCAGEN) injection 1 mg  1 mg IntraMUSCular PRN    insulin lispro (HUMALOG) injection   SubCUTAneous AC&HS    arformoterol (BROVANA) neb solution 15 mcg  15 mcg Nebulization BID RT    And    budesonide (PULMICORT) 500 mcg/2 ml nebulizer suspension  500 mcg Nebulization BID RT     ______________________________________________________________________  EXPECTED LENGTH OF STAY: - - -  ACTUAL LENGTH OF STAY:          1                 Alyssa Still MD

## 2018-02-20 NOTE — CONSULTS
Palliative Medicine Consult  Sammy: [phone] ([phone])    Patient Name: Ely Hui  YOB: 1950    Date of Initial Consult: 2-20-18  Reason for Consult: Overwhelming sx   Requesting Provider: Mahin Bear   Primary Care Physician: No primary care provider on file. SUMMARY:   Ely Hui is a 74 y.o. with a past history of lung cancer dx in 2007 s/p lobectomy w/ recurrence 6/2017 s/p chemo, radiation, currently on immunotherapy (followed by [facility]), severe COPD, congestive HF due to NICM s/p AICD and followed by Advanced heart failure team, who was admitted on 2/16/2018 with progressive SOB, hyponatremia. CT abd/pelvis showing progression of disease to liver. [facility] has eval here- plan is to f/u as outpatient to [address] [place] [address]. Current medical issues leading to Palliative Medicine involvement include:sx management- having acute RUQ pain over the past couple days. Social: Pt  to Jacquelin Echevarria, they have a son and daughter. Not taking opioids at home although has taken in past during cancer tx. PALLIATIVE DIAGNOSES:   1. RUQ pain   2. SOB, on O2 at home  3. Fatigue due to medical conditions   4. Nausea, possibly due to pain medications        PLAN:   1. Note that pt followed by [facility] as outpatient and goals clear to cont Opdivo as outpatient after acute medical issues managed. 2. Asked to address pain issues- however already started on Dilaudid 1mg IV prn which is improving sx, although leading to nausea. Does not wish to change this right now, as helping her pain so much. 3. Further w/u ongoing for RUQ pain- discussed w/ her that don't just want to mask sx if something besides hepatic mets causing pain. 4. Adding Zofran prn and bowel regimen.    5. As current regimen working, no changes right now but available to assist if needed for adjustments or conversion to po.   6. Pt does not have AMD on file- currently not feeling well enough to talk, but can bring up tmrw if does not have one. While goals are clear and I defer to oncology, would be good to talk about code status in light of her recurrent cancer, COPD, and CHF. 7. Initial consult note routed to primary continuity provider  8. Communicated plan of care with: Palliative IDT; Katy RN       GOALS OF CARE / TREATMENT PREFERENCES:     GOALS OF CARE:  Patient/Health Care Proxy Stated Goals:  (Sx relief)      TREATMENT PREFERENCES:   Code Status: Full Code    Advance Care Planning:  Advance Care Planning 2/16/2018   Patient's Healthcare Decision Maker is: Legal Next of Kin   Primary Decision Maker Name Jo Ann Sewell   Primary Decision Maker Phone Number [phone], [phone]   Primary Decision Maker Relationship to Patient Spouse   Secondary Decision Maker Name -   Secondary Decision [address] Phone Number -   Secondary Decision Maker Relationship to Patient -   Confirm Advance Directive None   Patient Would Like to Complete Advance Directive No       Medical Interventions: Full interventions   Other Instructions: Other:    As far as possible, the palliative care team has discussed with patient / health care proxy about goals of care / treatment preferences for patient. HISTORY:     History obtained from: Pt, chart, staff    CHIEF COMPLAINT: RUQ pain and nausea    HPI/SUBJECTIVE:    The patient is:   [x] Verbal and participatory  [] Non-participatory due to: Pt pleasant and engaging, despite dry heaving intermittently - recently received 1mg IV Dilaudid and pain much better but nauseated. No constipation. Some SOB, on home O2.     Clinical Pain Assessment (nonverbal scale for severity on nonverbal patients):   Clinical Pain Assessment  Severity: 3  Location: RUQ   Character: sharp  Duration: a coulple days  Effect: decr function   Factors: worse w/ movement, better w/ medication   Frequency: constant           Duration: for how long has pt been experiencing pain (e.g., 2 days, 1 month, years)  Frequency: how often pain is an issue (e.g., several times per day, once every few days, constant)     FUNCTIONAL ASSESSMENT:     Palliative Performance Scale (PPS):  PPS: 60       PSYCHOSOCIAL/SPIRITUAL SCREENING:     Palliative IDT has assessed this patient for cultural preferences / practices and a referral made as appropriate to needs (Cultural Services, Patient Advocacy, Ethics, etc.)    Advance Care Planning:  Advance Care Planning 2/16/2018   Patient's Healthcare Decision Maker is: Legal Next of [address] 71   Primary Decision Maker Name Jo Ann Sewell   Primary Decision Maker Phone Number [phone], [phone]   Primary Decision Maker Relationship to Patient Spouse   Secondary Decision Maker Name -   Secondary Decision [address] Phone Number -   Secondary Decision Maker Relationship to Patient -   Confirm Advance Directive None   Patient Would Like to Complete Advance Directive No       Any spiritual / Moravian concerns:  [] Yes /  [x] No    Caregiver Burnout:  [] Yes /  [x] No /  [] No Caregiver Present      Anticipatory grief assessment:   [x] Normal  / [] Maladaptive       ESAS Anxiety: Anxiety: 0    ESAS Depression: Depression: 0        REVIEW OF SYSTEMS:     Positive and pertinent negative findings in ROS are noted above in HPI. The following systems were [x] reviewed / [] unable to be reviewed as noted in HPI  Other findings are noted below. Systems: constitutional, ears/nose/mouth/throat, respiratory, gastrointestinal, genitourinary, musculoskeletal, integumentary, neurologic, psychiatric, endocrine. Positive findings noted below. Modified ESAS Completed by: provider   Fatigue: 3 Drowsiness: 0   Depression: 0 Pain: 3   Anxiety: 0 Nausea: 3   Anorexia: 3 Dyspnea: 2     Constipation: No     Stool Occurrence(s): 1        PHYSICAL EXAM:     From RN flowsheet:  Wt Readings from Last 3 Encounters:   02/20/18 115 lb 11.9 oz (52.5 kg)   01/31/18 113 lb (51.3 kg)   01/08/18 113 lb (51.3 kg)     Blood pressure 125/78, pulse 87, temperature 97.6 °F (36.4 °C), resp. rate 18, height 5' 6\" (1.676 m), weight 115 lb 11.9 oz (52.5 kg), SpO2 98 %, not currently breastfeeding. Pain Scale 1: Numeric (0 - 10)  Pain Intensity 1: 3  Pain Onset 1: this am  Pain Location 1: Flank  Pain Orientation 1: Right, Mid  Pain Description 1: Sharp  Pain Intervention(s) 1: Medication (see MAR)    Constitutional: awake, alert, oriented, mild distress  Eyes: pupils equal, anicteric  ENMT: no nasal discharge, moist mucous membranes  Cardiovascular: regular rhythm  Respiratory: breathing not labored at rest  Gastrointestinal: soft , min TTP over RUQ but just had pain meds, no rebound   Musculoskeletal: no deformity, no tenderness to palpation  Skin: warm, dry  Neurologic: following commands, moving all extremities  Psychiatric: full affect, no hallucinations       HISTORY:     Principal Problem:    SOB (shortness of breath) (8/2/2017)      Past Medical History:   Diagnosis Date    Cancer ([facility]     Lung CA    Chronic obstructive pulmonary disease (HCC)     Emphysema     Heart failure ([facility] [facility]       Past Surgical History:   Procedure Laterality Date    CHEST SURGERY PROCEDURE UNLISTED Bilateral 2007/2009    lung lobe resection    HX CHOLECYSTECTOMY      HX GYN      GEETA    HX ORTHOPAEDIC      shoulder    HX OTHER SURGICAL      pacemaker/defib    INS PPM/ICD LED SING ONLY  11/4/2016           History reviewed. No pertinent family history. History reviewed, no pertinent family history.   Social History   Substance Use Topics    Smoking status: Former Smoker     Packs/day: 1.00     Years: 40.00     Quit date: 2/6/2017    Smokeless tobacco: Former User     Quit date: 10/26/2016    Alcohol use No     No Known Allergies   Current Facility-Administered Medications   Medication Dose Route Frequency    albuterol (PROVENTIL VENTOLIN) nebulizer solution 2.5 mg  2.5 mg Nebulization Q6H PRN    HYDROmorphone (PF) (DILAUDID) injection 1 mg  1 mg IntraVENous Q3H PRN    [START ON 2/21/2018] predniSONE (DELTASONE) tablet 40 mg  40 mg Oral DAILY WITH BREAKFAST    ondansetron (ZOFRAN) injection 4 mg  4 mg IntraVENous Q4H PRN    senna-docusate (PERICOLACE) 8.6-50 mg per tablet 2 Tab  2 Tab Oral DAILY    0.9% sodium chloride infusion  1,000 mL/hr IntraVENous CONTINUOUS    levoFLOXacin (LEVAQUIN) tablet 250 mg  250 mg Oral Q24H    oxyCODONE IR (ROXICODONE) tablet 5 mg  5 mg Oral Q4H PRN    albuterol-ipratropium (DUO-NEB) 2.5 MG-0.5 MG/3 ML  3 mL Nebulization Q6H RT    enoxaparin (LOVENOX) injection 40 mg  40 mg SubCUTAneous Q24H    aspirin chewable tablet 81 mg  81 mg Oral DAILY    metoprolol succinate (TOPROL-XL) XL tablet 50 mg  50 mg Oral DAILY    sodium chloride (NS) flush 5-10 mL  5-10 mL IntraVENous Q8H    sodium chloride (NS) flush 5-10 mL  5-10 mL IntraVENous PRN    pantoprazole (PROTONIX) tablet 40 mg  40 mg Oral DAILY    glucose chewable tablet 16 g  4 Tab Oral PRN    dextrose (D50W) injection syrg 12.5-25 g  12.5-25 g IntraVENous PRN    glucagon (GLUCAGEN) injection 1 mg  1 mg IntraMUSCular PRN    insulin lispro (HUMALOG) injection   SubCUTAneous AC&HS    arformoterol (BROVANA) neb solution 15 mcg  15 mcg Nebulization BID RT    And    budesonide (PULMICORT) 500 mcg/2 ml nebulizer suspension  500 mcg Nebulization BID RT          LAB AND IMAGING FINDINGS:     Lab Results   Component Value Date/Time    WBC 18.5 (H) 02/20/2018 03:44 AM    HGB 12.3 02/20/2018 03:44 AM    PLATELET 875 (L) 46/45/5086 03:44 AM     Lab Results   Component Value Date/Time    Sodium 124 (L) 02/20/2018 03:44 AM    Potassium 4.9 02/20/2018 03:44 AM    Chloride 88 (L) 02/20/2018 03:44 AM    CO2 23 02/20/2018 03:44 AM    BUN 23 (H) 02/20/2018 03:44 AM    Creatinine 0.98 02/20/2018 03:44 AM    Calcium 9.2 02/20/2018 03:44 AM    Magnesium 1.7 02/20/2018 03:44 AM    Phosphorus 2.1 (L) 02/18/2018 04:31 AM      Lab Results   Component Value Date/Time    AST (SGOT) 290 (H) 02/20/2018 03:44 AM    Alk.  phosphatase 223 (H) 02/20/2018 03:44 AM    Protein, total 6.3 (L) 02/20/2018 03:44 AM    Albumin 3.0 (L) 02/20/2018 03:44 AM    Globulin 3.3 02/20/2018 03:44 AM     Lab Results   Component Value Date/Time    INR 1.0 02/16/2018 04:28 AM    Prothrombin time 10.4 02/16/2018 04:28 AM    aPTT 26.3 02/16/2018 04:28 AM      No results found for: IRON, FE, TIBC, IBCT, PSAT, FERR   No results found for: PH, PCO2, PO2  No components found for: [place]   Lab Results   Component Value Date/Time     02/16/2018 03:33 AM    CK - MB 2.4 02/16/2018 03:33 AM                Total time: 50 min   Counseling / coordination time, spent as noted above: 35 min   > 50% counseling / coordination?: yes    Prolonged service was provided for  []30 min   []75 min in face to face time in the presence of the patient, spent as noted above. Time Start:   Time End:   Note: this can only be billed with 21663 (initial) or 56062 (follow up). If multiple start / stop times, list each separately.

## 2018-02-21 NOTE — PROGRESS NOTES
-Hematology / Oncology ([facility]) -  -Primary Oncologist- Dr Katelyn Mcknight  -[address] [place] [address] stable. Abd discomfort stable. Jaylene Metzger-    Patient Vitals for the past 24 hrs:   Temp Pulse Resp BP SpO2   02/21/18 0817 - - - - 96 %   02/21/18 0757 - - - - 97 %   02/21/18 0727 97.5 °F (36.4 °C) 96 20 114/72 97 %   02/21/18 0444 97.9 °F (36.6 °C) 90 20 121/81 98 %   02/20/18 2323 97.8 °F (36.6 °C) 85 20 130/77 98 %   02/20/18 2010 - - - - 95 %   02/20/18 1922 97.5 °F (36.4 °C) 82 22 120/78 99 %   02/20/18 1633 97.4 °F (36.3 °C) (!) 108 16 131/79 97 %   02/20/18 1417 - - - - 98 %   02/20/18 1136 97.6 °F (36.4 °C) 87 18 125/78 98 %     02/21 0701 - 02/21 1900  In: -   Out: 25 [Urine:25]  Gen: nad  Chest: bilateral breath sounds coarse  Cardiac: rrr  Abd: mild discomfort to deep palpation    -Labs-    Recent Labs      02/21/18   0359  02/20/18   0344  02/19/18   1200  02/19/18   0406   WBC  24.0*  18.5*   --   14.9*   HGB  13.1  12.3   --   11.8   PLT  122*  119*   --   127*   ANEU   --   16.3*   --    --    NA  124*  124*  125*  121*   K  6.1*  4.9  5.3*  5.6*   GLU  84  69  100  78   BUN  46*  23*  22*  23*   CREA  1.83*  0.98  1.10*  1.14*   ALT  382*  260*   --    --    SGOT  458*  290*   --    --    TBILI  1.0  0.8   --    --    AP  242*  223*   --    --    CA  9.6  9.2  9.1  9.3   MG  2.4  1.7   --   1.6   PHOS  6.5*   --    --    --        -Imaging-       -Assessment + Plan-     1. Metastatic non-small cell lung cancer. This patient was just started on Opdivo 3 weeks ago. She missed her second dose 1 week ago. She did not have any obvious toxicities from her first course of treatment. A CT scan of the abdomen and pelvis was performed at admission and compared to a January PET scan and demonstrates disease progression in the liver.   However, this study was done too soon after the beginning of Opdivo to make a formal decision regarding her disease status, given the fact that she could have had some of this progression before her first dose of treatment, as well as the fact that it can take several doses of Opdivo before response is seen. Unfortunately, we cannot give Opdivo to inpatients. We will have to wait until she is well enough to leave the hospital to give her her next dose. 2.  Right upper quadrant pain. This occurred rather suddenly over the weekend and is atypical for tumor-related pain. She is being treated with p.r.n. narcotics right now and has had improvement, but is still uncomfortable.   ---- she reports it was noted insufficient ascites present for paracentesis  ---- may need to ask surgery for an opinion as well if this pain worsens. 3.  Hyponatremia. This was thought to be due to SIADH. She is on fluid restriction ordered by nephrology, and they are managing this problem.     ---- stable

## 2018-02-21 NOTE — PROGRESS NOTES
Hospitalist Progress Note        Date of Service:  2018  NAME:  Agueda Barger  :  1950  MRN:  [id number]      Admission Summary:   The patient is a 79-year-old female with past medical history of metastatic lung cancer, congestive heart failure, COPD on chronic oxygen at home, history of multiple thyroid nodules, tobacco use disorder, status post ICD placement, nonischemic cardiomyopathy who presents to the hospital with the above mentioned symptoms. Patient reports that about two days back, she had some blood work done and was found to be hyponatremic going in for IV infusion and was fine after that. Patient reports that yesterday she started having some wheezing associated with shortness of breath, cough and dyspnea on exertion. The symptoms progressively got worse. She had to increase her home oxygen at 3 liters, but that did not seem to help and she decided to come to the hospital.  The patient reports that the cough is somewhat productive of yellowish colored sputum. She also reports that she had some chills, but denies any fevers associated with her symptoms. Patient reports that she has history of lung cancer, had right lobectomy in  and left lobectomy in , currently on chemotherapy. Patient also reports that she was told that there is \"new cancer\" in her liver. I am not sure whether this was metastases versus primary liver cancer, though she reports that \"it is being the same chemotherapy. \"  Patient denies any other concerns or problems.   Denies any headache, blurry vision, sore throat, trouble swallowing, trouble with speech, any chest pain, abdominal pain, constipation, diarrhea, urinary symptoms, focal or generalized neurological weakness, recent travel, sick contacts, falls, injuries, hematemesis, melena, hemoptysis.     Interval history / Subjective:     RUQ abd pain improved with IV Dilaudid but persists, also nauseated, no vomiting, breathing unchanged. No bm but denies obstipation. Assessment & Plan:     Shortness of breath, most likely secondary to chronic obstructive pulmonary disease exacerbation with underlying metastatic pulmonary malignancy    -continue PO steroids  -d/c antibiotics  -continue DuoNebs, oxygen support, pulse ox monitoring   -CTA of the chest does not show any pulmonary embolism and/or any signs of pneumonia  -chronic respiratory failure with hypoxia at baseline (on 2 L O2)    Abdominal Pain: RUQ pain worsened overnight,CT abd showed complex ascites as well as a pelvic stone with no site of perforation identified, as well as interval progression of hepatic mets  -continue current med regimen  -palliative care recs appreciated  -suspect the pain is due to liver mets, though the rapid progression of pain goes against this  -lactic acid is elevated however in the setting of metastatic cancer and liver involvement, this is not a surprising finding. She does not appear infected clinically. Leukocytosis could be explained by steroids  -check KUB today to r/o obstruction/ileus, consult general surgery per oncology    OLYA: noted on 2/21, urine output significantly dropped, likely due to poor PO intake  -bladder scan without retention  -bolus IVNS and start rate  -nephrology following    Hyponatremia, recurrent, probably secondary to malignancy  -likely a chronic issue given her cancer and SIADH  -Nephrology was consulted  -strict Is/Os  -monitor with IVNS (was on fluid restriction prior)    Hyperkalemia:   -repeat kayexalate today  -check BMP later today    Elevated liver enzymes/Transaminitis  -likely secondary to metastatic disease    Lung cancer  -patient is currently on immunotherapy  -oncology consulted - too early to assess response to Opdivo    History of systolic congestive heart failure: last echo in 2017 with normal LVEF. TTE in 2016 had a reduced LVEF. Currently appears dry.  Will monitor volume state. Underweight: BMI 18.68  -consult nutrition when she is taking PO adequately      DVT ppx: SCDs  Code status: Full    Care Plan discussed with: Patient/Family and Nurse  Disposition: TBD     Hospital Problems  Date Reviewed: 2/16/2018          Codes Class Noted POA    * (Principal)SOB (shortness of breath) ICD-10-CM: R06.02  ICD-9-CM: 786.05  8/2/2017 Unknown              Review of Systems:   Pertinent items are noted in HPI. Vital Signs:    Last 24hrs VS reviewed since prior progress note.  Most recent are:  Visit Vitals    /72 (BP 1 Location: Right arm, BP Patient Position: At rest)    Pulse 96    Temp 97.5 °F (36.4 °C)    Resp 20    Ht 5' 6\" (1.676 m)    Wt 52.9 kg (116 lb 10 oz)    SpO2 96%    Breastfeeding No    BMI 18.82 kg/m2         Intake/Output Summary (Last 24 hours) at 02/21/18 1045  Last data filed at 02/21/18 3287   Gross per 24 hour   Intake                0 ml   Output               25 ml   Net              -25 ml        Physical Examination:             Constitutional:  NAD, chronically-ill appearing   ENT:  anicteric sclerae, normal conjunctiva, MM dry, neck supple   Resp:  diminished breath sounds globally, normal work of breathing, tachypneic taking shallow respirations due to pain   CV:  Regular rhythm, tachycardic, no MRG, no peripheral edema    GI:  Soft, moderate tenderness of the RUQ and epigastrium, bs+    Musculoskeletal:  No edema  Skin: no rash seen    Neurologic/psyc:  CN grossly intact, normal speech, moves all extremities, appears uncomfortable but not anxious nor agitated           Data Review:    Review and/or order of clinical lab test  Review and/or order of tests in the radiology section of CPT      Labs:     Recent Labs      02/21/18   0359  02/20/18   0344   WBC  24.0*  18.5*   HGB  13.1  12.3   HCT  38.5  35.9   PLT  122*  119*     Recent Labs      02/21/18   0359  02/20/18   0344  02/19/18   1200  02/19/18   0406   NA  124*  124* 125*  121*   K  6.1*  4.9  5.3*  5.6*   CL  86*  88*  89*  86*   CO2  25  23  23  25   BUN  46*  23*  22*  23*   CREA  1.83*  0.98  1.10*  1.14*   GLU  84  69  100  78   CA  9.6  9.2  9.1  9.3   MG  2.4  1.7   --   1.6   PHOS  6.5*   --    --    --      Recent Labs      02/21/18   0359  02/20/18   0344   SGOT  458*  290*   ALT  382*  260*   AP  242*  223*   TBILI  1.0  0.8   TP  6.1*  6.3*   ALB  3.0*  3.0*   GLOB  3.1  3.3       Lab Results   Component Value Date/Time    Cholesterol, total 178 10/27/2016 03:00 AM    HDL Cholesterol 61 10/27/2016 03:00 AM    LDL, calculated 99.8 10/27/2016 03:00 AM    Triglyceride 86 10/27/2016 03:00 AM    CHOL/HDL Ratio 2.9 10/27/2016 03:00 AM     Lab Results   Component Value Date/Time    Glucose (POC) 101 (H) 02/21/2018 06:47 AM    Glucose (POC) 115 (H) 02/20/2018 09:19 PM    Glucose (POC) 145 (H) 02/20/2018 04:49 PM    Glucose (POC) 99 02/20/2018 11:44 AM    Glucose (POC) 83 02/20/2018 06:54 AM     Lab Results   Component Value Date/Time    Color YELLOW/STRAW 02/17/2018 12:31 PM    Appearance CLEAR 02/17/2018 12:31 PM    Specific gravity 1.029 02/17/2018 12:31 PM    pH (UA) 6.0 02/17/2018 12:31 PM    Protein TRACE (A) 02/17/2018 12:31 PM    Glucose NEGATIVE  02/17/2018 12:31 PM    Ketone NEGATIVE  02/17/2018 12:31 PM    Bilirubin NEGATIVE  08/03/2017 05:59 AM    Urobilinogen 0.2 02/17/2018 12:31 PM    Nitrites NEGATIVE  02/17/2018 12:31 PM    Leukocyte Esterase NEGATIVE  02/17/2018 12:31 PM    Epithelial cells FEW 02/17/2018 12:31 PM    Bacteria NEGATIVE  02/17/2018 12:31 PM    WBC 0-4 02/17/2018 12:31 PM    RBC 5-10 02/17/2018 12:31 PM       Medications Reviewed:     Current Facility-Administered Medications   Medication Dose Route Frequency    0.9% sodium chloride infusion  75 mL/hr IntraVENous CONTINUOUS    albuterol (PROVENTIL VENTOLIN) nebulizer solution 2.5 mg  2.5 mg Nebulization Q6H PRN    HYDROmorphone (PF) (DILAUDID) injection 1 mg  1 mg IntraVENous Q3H PRN    predniSONE (DELTASONE) tablet 40 mg  40 mg Oral DAILY WITH BREAKFAST    ondansetron (ZOFRAN) injection 4 mg  4 mg IntraVENous Q4H PRN    senna-docusate (PERICOLACE) 8.6-50 mg per tablet 2 Tab  2 Tab Oral DAILY    prochlorperazine (COMPAZINE) with saline injection 5 mg  5 mg IntraVENous Q6H PRN    levoFLOXacin (LEVAQUIN) tablet 250 mg  250 mg Oral Q24H    oxyCODONE IR (ROXICODONE) tablet 5 mg  5 mg Oral Q4H PRN    albuterol-ipratropium (DUO-NEB) 2.5 MG-0.5 MG/3 ML  3 mL Nebulization Q6H RT    aspirin chewable tablet 81 mg  81 mg Oral DAILY    metoprolol succinate (TOPROL-XL) XL tablet 50 mg  50 mg Oral DAILY    sodium chloride (NS) flush 5-10 mL  5-10 mL IntraVENous Q8H    sodium chloride (NS) flush 5-10 mL  5-10 mL IntraVENous PRN    pantoprazole (PROTONIX) tablet 40 mg  40 mg Oral DAILY    glucose chewable tablet 16 g  4 Tab Oral PRN    dextrose (D50W) injection syrg 12.5-25 g  12.5-25 g IntraVENous PRN    glucagon (GLUCAGEN) injection 1 mg  1 mg IntraMUSCular PRN    insulin lispro (HUMALOG) injection   SubCUTAneous AC&HS    arformoterol (BROVANA) neb solution 15 mcg  15 mcg Nebulization BID RT    And    budesonide (PULMICORT) 500 mcg/2 ml nebulizer suspension  500 mcg Nebulization BID RT     ______________________________________________________________________  EXPECTED LENGTH OF STAY: 3d 2h  ACTUAL LENGTH OF STAY:          2                 Israel Brooks MD

## 2018-02-21 NOTE — PROGRESS NOTES
Patient name: Ely Hui  MRN: [id number]    Nephrology Progress note:    Assessment:  Hyponatremia: Na remains low->given isotonic saline yesterday. Suspect SIADH 2 to underlying metastatic cancer maybe exacerbated by Opdivo    OLYA: Evolving OLYA-> 2 to KHANH. Will continue to worsen over next several days.      Hyperkalemia: 2 to Lisinopril/Aldactone-> worsening now with OLYA     Lung CA: w/likely mets. Prior hx of B/l lobectomy/chemo/XRT . Recently started on Opdivo by Dr. Bere Connolly ([facility])     COPD     NICM    Plan/Recommendations:  Long discussion with patient and family regarding risks of OLYA and particularly Hyperkalemia-. Risks and benefits of HD discussed -> they have appropriately opted not to pursue HD. They understand high risk of death  Ct conservative measures to treat hyperkalemia-> Kayexalate/IV Dextrose/insulin/Calcium glconate  Can consider Veltassa for a few days-> better tolerated than kayexalate  Ct gentle IV NS  Palliative care following-> poor overall prognosis        Subjective:  Seems to be in significant pain.  On PCA    ROS:   No nausea, no vomiting  No chest pain, no shortness of breath    Exam:  Visit Vitals    /72 (BP 1 Location: Right arm, BP Patient Position: At rest)    Pulse 96    Temp 97.5 °F (36.4 °C)    Resp 20    Ht 5' 6\" (1.676 m)    Wt 52.9 kg (116 lb 10 oz)    SpO2 95%    Breastfeeding No    BMI 18.82 kg/m2     Wt Readings from Last 3 Encounters:   02/21/18 52.9 kg (116 lb 10 oz)   01/31/18 51.3 kg (113 lb)   01/08/18 51.3 kg (113 lb) Intake/Output Summary (Last 24 hours) at 02/21/18 1544  Last data filed at 02/21/18 0727   Gross per 24 hour   Intake                0 ml   Output               25 ml   Net              -25 ml       Gen: NAD  HEENT: AT/NC, EOMI, moist mucous membrane, no scleral icterus  Lungs/Chest wall: Coarse BS B/L  Cardiovascular: S1/S2, normal rate, regular rhythm. +AICD  Abdomen: soft, NT, ND, BS+, no HSM  Ext: no clubbing or cyanosis.  No edema  CNS: conversive      Current Facility-Administered Medications   Medication Dose Route Frequency Last Dose    0.9% sodium chloride infusion  75 mL/hr IntraVENous CONTINUOUS 75 mL/hr at 02/21/18 1509    HYDROmorphone (PF) (DILAUDID) injection 1 mg  1 mg IntraVENous Q3H PRN      HYDROmorphone (PF) 15 mg/30 ml (DILAUDID) PCA   IntraVENous CONTINUOUS      sodium polystyrene (KAYEXALATE) 15 gram/60 mL oral suspension 30 g  30 g Oral NOW      dextrose (D50W) injection syrg 25 g  25 g IntraVENous NOW      calcium gluconate 1 g in 0.9% sodium chloride 100 mL IVPB  1 g IntraVENous ONCE      insulin regular (NOVOLIN R, HUMULIN R) injection 10 Units  10 Units IntraVENous ONCE      albuterol (PROVENTIL VENTOLIN) nebulizer solution 2.5 mg  2.5 mg Nebulization Q6H PRN 2.5 mg at 02/20/18 0408    predniSONE (DELTASONE) tablet 40 mg  40 mg Oral DAILY WITH BREAKFAST 40 mg at 02/21/18 0822    ondansetron (ZOFRAN) injection 4 mg  4 mg IntraVENous Q4H PRN 4 mg at 02/21/18 1448    senna-docusate (PERICOLACE) 8.6-50 mg per tablet 2 Tab  2 Tab Oral DAILY 2 Tab at 02/21/18 9469    prochlorperazine (COMPAZINE) with saline injection 5 mg  5 mg IntraVENous Q6H PRN 5 mg at 02/21/18 0527    levoFLOXacin (LEVAQUIN) tablet 250 mg  250 mg Oral Q24H 250 mg at 02/21/18 1051    oxyCODONE IR (ROXICODONE) tablet 5 mg  5 mg Oral Q4H PRN 5 mg at 02/20/18 0446    albuterol-ipratropium (DUO-NEB) 2.5 MG-0.5 MG/3 ML  3 mL Nebulization Q6H RT 3 mL at 02/21/18 0553    aspirin chewable tablet 81 mg  81 mg Oral DAILY 81 mg at 02/21/18 8100    metoprolol succinate (TOPROL-XL) XL tablet 50 mg  50 mg Oral DAILY 50 mg at 02/21/18 4980    sodium chloride (NS) flush 5-10 mL  5-10 mL IntraVENous Q8H 10 mL at 02/21/18 1448    sodium chloride (NS) flush 5-10 mL  5-10 mL IntraVENous PRN      pantoprazole (PROTONIX) tablet 40 mg  40 mg Oral DAILY 40 mg at 02/21/18 5573    glucose chewable tablet 16 g  4 Tab Oral PRN      dextrose (D50W) injection syrg 12.5-25 g  12.5-25 g IntraVENous PRN      glucagon (GLUCAGEN) injection 1 mg  1 mg IntraMUSCular PRN      insulin lispro (HUMALOG) injection   SubCUTAneous AC&HS Stopped at 02/16/18 1130    arformoterol (BROVANA) neb solution 15 mcg  15 mcg Nebulization BID RT 15 mcg at 02/16/18 1006    And    budesonide (PULMICORT) 500 mcg/2 ml nebulizer suspension  500 mcg Nebulization BID  mcg at 02/21/18 0756       Labs/Data:    Lab Results   Component Value Date/Time    WBC 24.0 (H) 02/21/2018 03:59 AM    Hemoglobin (POC) 11.9 02/16/2018 09:45 AM    HGB 13.1 02/21/2018 03:59 AM    Hematocrit (POC) 35 02/16/2018 09:45 AM    HCT 38.5 02/21/2018 03:59 AM    PLATELET 308 (L) 34/88/2182 03:59 AM    MCV 95.1 02/21/2018 03:59 AM       Lab Results   Component Value Date/Time    Sodium 123 (L) 02/21/2018 02:13 PM    Potassium 6.7 (HH) 02/21/2018 02:13 PM    Chloride 89 (L) 02/21/2018 02:13 PM    CO2 22 02/21/2018 02:13 PM    Anion gap 12 02/21/2018 02:13 PM    Glucose 122 (H) 02/21/2018 02:13 PM    BUN 55 (H) 02/21/2018 02:13 PM    Creatinine 2.40 (H) 02/21/2018 02:13 PM    BUN/Creatinine ratio 23 (H) 02/21/2018 02:13 PM    GFR est AA 25 (L) 02/21/2018 02:13 PM    GFR est non-AA 20 (L) 02/21/2018 02:13 PM    Calcium 9.0 02/21/2018 02:13 PM       Patient seen and examined. Chart reviewed. Labs, data and other pertinent notes reviewed in last 24 hrs.     Discussed with patient and family and Dr. Eris Michelle by:  Oj Sunshine MD

## 2018-02-21 NOTE — PROGRESS NOTES
Palliative Medicine Consult  Sammy: [phone] ([phone])    Patient Name: Jenita Brittle  YOB: 1950    Date of Initial Consult: 2-20-18  Reason for Consult: Overwhelming sx   Requesting Provider: Emerson Sam   Primary Care Physician: No primary care provider on file. SUMMARY:   Jenita Brittle is a 74 y.o. with a past history of lung cancer dx in 2007 s/p lobectomy w/ recurrence 6/2017 s/p chemo, radiation, currently on immunotherapy (followed by [facility]), severe COPD, congestive HF due to NICM s/p AICD and followed by Advanced heart failure team, who was admitted on 2/16/2018 with progressive SOB, hyponatremia. CT abd/pelvis showing progression of disease to liver. [facility] has eval here- plan is to f/u as outpatient to [address] [place] [address]. Current medical issues leading to Palliative Medicine involvement include:sx management- having acute RUQ pain over the past couple days. Social: Pt  to Dennis Costa, they have a son Quinn Agarwal and daughter Bernard Zimmer. Not taking opioids at home although has taken in past during cancer tx. PALLIATIVE DIAGNOSES:   1. RUQ pain - severe  2. Nausea, possibly due to pain medications  3. SOB, on O2 at home  4. Fatigue due to medical conditions   5. PLAN:   1. Note that pt followed by [facility] as outpatient and goals clear to cont Opdivo as outpatient after acute medical issues managed. Was consulted by attending. 2. Meet w/ pt,  Earnest Aguiar, son Quinn Agarwal along w/ [facility] IN[facility] LCSW. 3. Pain: Unable to do paracentesis this AM due to not enough ascites. Still severe RUQ pain. No recent BM or flatus. KUB and surgery consult have been ordered. Family understands don't just want to mask the pain, but are asking for other pain control to avoid pain getting to a 9-10.  4. Dilaudid 1mg IV breakthrough dose working well, but may be causing nausea.  Pt does not want to change opioids, as it is working- although did explain that the other medications may not have worked due to dose. 5. Starting Dilaudid PCA no basal, 0.3mg IV every 10 min demand and still have the 1mg IV breakthrough dose. 6. Nausea: Related to abdominal issue and/or Dilaudid. Prn Zofran for now. 7. Constipation: KUB pending. Has chiquis colace. Depending on KUB may add Miralax. 8. Care decisions. Did not address care decisions w/ pt or family, but they brought it up. Family even asking when hospice would be appropriate. 9. With regard to this, defer to oncology (primary oncologist is Dr Concepcion Hampton w/ [facility]). But since they bring up care decisions, do start to talk about AMDs and what-ifs in the future. Family, saurav children, seem very realistic about situation. 8. Son Teofilo Blair follows us in the hallway- he wants acute issues figured out certainly and knows that the plan is for Opdivo, but if the immunotherapy does not add to quality of life - not sure that pt wants to go back/forth to clinic and the hospital. A little while ago, oncology gave life expectancy of 3-6 months. This may be lengthen w/ Opdivo, but family saying that if it is not quality time then it may not be worth it, in their opinion. Family knows that it is not just cancer, pt w/ HF and COPD as well. 11. Would be happy to have family meeting to talk about \"what ifs\" in the future, while deferring to Oncology w/ regard to further treatment plan. Family appreciative.    12. Communicated plan of care with: Palliative IDT; Katy RN; Dr Mcpherson Jean / TREATMENT PREFERENCES:     GOALS OF CARE:  Patient/Health Care Proxy Stated Goals:  (Sx relief)      TREATMENT PREFERENCES:   Code Status: Full Code    Advance Care Planning:  Advance Care Planning 2/16/2018   Patient's Healthcare Decision Maker is: Legal Next of Whitney 71   Primary Decision Maker Name Carlin Cheadle   Primary Decision Maker Phone Number [phone], [phone]   Primary Decision Maker Relationship to Patient Spouse   Secondary Decision Maker Name -   Secondary Decision Maker Phone Number -   Secondary Decision Maker Relationship to Patient -   Confirm Advance Directive None   Patient Would Like to Complete Advance Directive No       Medical Interventions: Full interventions   Other Instructions: Other:    As far as possible, the palliative care team has discussed with patient / health care proxy about goals of care / treatment preferences for patient. HISTORY:       HPI/SUBJECTIVE:    The patient is:   [x] Verbal and participatory  [] Non-participatory due to:     Pt a bit fatigued, was having 9/10 RUQ pain, just received 1mg IV Dilaudid and feels better- now a 3/10. + nausea, dry heaving. Last BM ~ 4 days ago, no recent flatus.      Clinical Pain Assessment (nonverbal scale for severity on nonverbal patients):   Clinical Pain Assessment  Severity: 5  Location: RUQ   Character: sharp  Duration: a coulple days  Effect: decr function   Factors: worse w/ movement, better w/ medication   Frequency: constant           Duration: for how long has pt been experiencing pain (e.g., 2 days, 1 month, years)  Frequency: how often pain is an issue (e.g., several times per day, once every few days, constant)     FUNCTIONAL ASSESSMENT:     Palliative Performance Scale (PPS):  PPS: 60       PSYCHOSOCIAL/SPIRITUAL SCREENING:     Palliative IDT has assessed this patient for cultural preferences / practices and a referral made as appropriate to needs (Cultural Services, Patient Advocacy, Ethics, etc.)    Advance Care Planning:  Advance Care Planning 2/16/2018   Patient's Healthcare Decision Maker is: Legal Next of Whitney 71   Primary Decision Maker Name Jessie Koroma   Primary Decision Maker Phone Number [phone], [phone]   Primary Decision Maker Relationship to Patient Spouse   Secondary Decision Maker Name -   Secondary Decision Maker Phone Number -   Secondary Decision Maker Relationship to Patient -   Confirm Advance Directive None   Patient Would Like to Complete Advance Directive No       Any spiritual / Synagogue concerns:  [] Yes /  [x] No    Caregiver Burnout:  [] Yes /  [x] No /  [] No Caregiver Present      Anticipatory grief assessment:   [x] Normal  / [] Maladaptive       ESAS Anxiety: Anxiety: 0    ESAS Depression: Depression: 0        REVIEW OF SYSTEMS:     Positive and pertinent negative findings in ROS are noted above in HPI. The following systems were [x] reviewed / [] unable to be reviewed as noted in HPI  Other findings are noted below. Systems: constitutional, ears/nose/mouth/throat, respiratory, gastrointestinal, genitourinary, musculoskeletal, integumentary, neurologic, psychiatric, endocrine. Positive findings noted below. Modified ESAS Completed by: provider   Fatigue: 3 Drowsiness: 1   Depression: 0 Pain: 5   Anxiety: 0 Nausea: 4   Anorexia: 5 Dyspnea: 2     Constipation: No     Stool Occurrence(s): 1        PHYSICAL EXAM:     From RN flowsheet:  Wt Readings from Last 3 Encounters:   02/21/18 116 lb 10 oz (52.9 kg)   01/31/18 113 lb (51.3 kg)   01/08/18 113 lb (51.3 kg)     Blood pressure 114/72, pulse 96, temperature 97.5 °F (36.4 °C), resp. rate 20, height 5' 6\" (1.676 m), weight 116 lb 10 oz (52.9 kg), SpO2 96 %, not currently breastfeeding.     Pain Scale 1: Numeric (0 - 10)  Pain Intensity 1: 8  Pain Onset 1: this am  Pain Location 1: Flank  Pain Orientation 1: Right, Mid, Posterior  Pain Description 1: Aching  Pain Intervention(s) 1: Repositioned   Last BM: Monday     Constitutional: awake, alert, oriented, fatigued   Eyes: pupils equal, anicteric  ENMT: no nasal discharge, moist mucous membranes  Cardiovascular: regular rhythm  Respiratory: breathing not labored at rest  Gastrointestinal: soft , min TTP over RUQ but just had pain meds, no rebound , very hypoactive bowel sounds  Musculoskeletal: no deformity, no tenderness to palpation  Skin: warm, dry  Neurologic: following commands, moving all extremities  Psychiatric: full affect, no hallucinations       HISTORY:     Principal Problem:    SOB (shortness of breath) (8/2/2017)      Past Medical History:   Diagnosis Date    Cancer [facility])     Lung CA    Chronic obstructive pulmonary disease (HCC)     Emphysema     Heart failure (HCC)       Past Surgical History:   Procedure Laterality Date    CHEST SURGERY PROCEDURE UNLISTED Bilateral 2007/2009    lung lobe resection    HX CHOLECYSTECTOMY      HX GYN      GEETA    HX ORTHOPAEDIC      shoulder    HX OTHER SURGICAL      pacemaker/defib    INS PPM/ICD LED SING ONLY  11/4/2016           History reviewed. No pertinent family history. History reviewed, no pertinent family history.   Social History   Substance Use Topics    Smoking status: Former Smoker     Packs/day: 1.00     Years: 40.00     Quit date: 2/6/2017    Smokeless tobacco: Former User     Quit date: 10/26/2016    Alcohol use No     No Known Allergies   Current Facility-Administered Medications   Medication Dose Route Frequency    0.9% sodium chloride infusion  75 mL/hr IntraVENous CONTINUOUS    HYDROmorphone (PF) (DILAUDID) injection 1 mg  1 mg IntraVENous Q3H PRN    HYDROmorphone (PF) 15 mg/30 ml (DILAUDID) PCA   IntraVENous CONTINUOUS    albuterol (PROVENTIL VENTOLIN) nebulizer solution 2.5 mg  2.5 mg Nebulization Q6H PRN    predniSONE (DELTASONE) tablet 40 mg  40 mg Oral DAILY WITH BREAKFAST    ondansetron (ZOFRAN) injection 4 mg  4 mg IntraVENous Q4H PRN    senna-docusate (PERICOLACE) 8.6-50 mg per tablet 2 Tab  2 Tab Oral DAILY    prochlorperazine (COMPAZINE) with saline injection 5 mg  5 mg IntraVENous Q6H PRN    levoFLOXacin (LEVAQUIN) tablet 250 mg  250 mg Oral Q24H    oxyCODONE IR (ROXICODONE) tablet 5 mg  5 mg Oral Q4H PRN    albuterol-ipratropium (DUO-NEB) 2.5 MG-0.5 MG/3 ML  3 mL Nebulization Q6H RT    aspirin chewable tablet 81 mg  81 mg Oral DAILY    metoprolol succinate (TOPROL-XL) XL tablet 50 mg  50 mg Oral DAILY    sodium chloride (NS) flush 5-10 mL  5-10 mL IntraVENous Q8H    sodium chloride (NS) flush 5-10 mL  5-10 mL IntraVENous PRN    pantoprazole (PROTONIX) tablet 40 mg  40 mg Oral DAILY    glucose chewable tablet 16 g  4 Tab Oral PRN    dextrose (D50W) injection syrg 12.5-25 g  12.5-25 g IntraVENous PRN    glucagon (GLUCAGEN) injection 1 mg  1 mg IntraMUSCular PRN    insulin lispro (HUMALOG) injection   SubCUTAneous AC&HS    arformoterol (BROVANA) neb solution 15 mcg  15 mcg Nebulization BID RT    And    budesonide (PULMICORT) 500 mcg/2 ml nebulizer suspension  500 mcg Nebulization BID RT          LAB AND IMAGING FINDINGS:     Lab Results   Component Value Date/Time    WBC 24.0 (H) 02/21/2018 03:59 AM    HGB 13.1 02/21/2018 03:59 AM    PLATELET 515 (L) 71/28/3753 03:59 AM     Lab Results   Component Value Date/Time    Sodium 124 (L) 02/21/2018 03:59 AM    Potassium 6.1 (H) 02/21/2018 03:59 AM    Chloride 86 (L) 02/21/2018 03:59 AM    CO2 25 02/21/2018 03:59 AM    BUN 46 (H) 02/21/2018 03:59 AM    Creatinine 1.83 (H) 02/21/2018 03:59 AM    Calcium 9.6 02/21/2018 03:59 AM    Magnesium 2.4 02/21/2018 03:59 AM    Phosphorus 6.5 (H) 02/21/2018 03:59 AM      Lab Results   Component Value Date/Time    AST (SGOT) 458 (H) 02/21/2018 03:59 AM    Alk.  phosphatase 242 (H) 02/21/2018 03:59 AM    Protein, total 6.1 (L) 02/21/2018 03:59 AM    Albumin 3.0 (L) 02/21/2018 03:59 AM    Globulin 3.1 02/21/2018 03:59 AM     Lab Results   Component Value Date/Time    INR 1.0 02/16/2018 04:28 AM    Prothrombin time 10.4 02/16/2018 04:28 AM    aPTT 26.3 02/16/2018 04:28 AM      No results found for: IRON, FE, TIBC, IBCT, PSAT, FERR   No results found for: PH, PCO2, PO2  No components found for: [place]   Lab Results   Component Value Date/Time     02/16/2018 03:33 AM    CK - MB 2.4 02/16/2018 03:33 AM                Total time: 45min   Counseling / coordination time, spent as noted above: 35 min   > 50% counseling / coordination?: yes    Prolonged service was provided for  []30 min   []75 min in face to face time in the presence of the patient, spent as noted above. Time Start:   Time End:   Note: this can only be billed with 11628 (initial) or 62421 (follow up). If multiple start / stop times, list each separately.

## 2018-02-21 NOTE — PROGRESS NOTES
Spoke with MD and advised patient nauseated and dry heaving. Also, advised that patient has been given dilauded and zofran. He advised to put in order for compazine and use that if zofran does not work for patient. 1930-Bedside and Verbal shift change report given to Ivon Montiel RN (oncoming nurse) by Chata Velez RN (offgoing nurse). Report included the following information SBAR, Kardex, Intake/Output, MAR and Recent Results.

## 2018-02-21 NOTE — PROGRESS NOTES
NUTRITION COMPLETE ASSESSMENT    RECOMMENDATIONS:   1. Continue current diet, Supplements (specify)  2. Encourage increased po intake meals/supplements  3. Weekly weights     Interventions/Plan:   Food/Nutrient Delivery:           RD to add supplements    Assessment:   Reason for Assessment:   [x] Provider Consult    Diet: Cardiac (Low potassium)  Supplements: None  Nutritionally Significant Medications: [x] Reviewed & Includes: calcium gluconate, dextrose, Humalog, Humulin, Protonix, Prednisone, Pericolace, Kayexalate  Meal Intake:   Patient Vitals for the past 100 hrs:   % Diet Eaten   02/19/18 0400 0 %   02/19/18 0000 0 %   02/18/18 2000 0 %   02/18/18 1600 100 %   02/18/18 1200 100 %   02/18/18 0800 100 %       Pre-Hospitalization:  Usual Appetite: Poor    Current Hospitalization:   Fluid Restriction: 1200 ml  Appetite: Poor  PO Ability: Independent Average po intake:Refused Meals past 3 days  Average supplements intake:        Subjective:  \"I don't want to talk. \"    Objective:  Pt seen for MD consult. Pt admitted with SOB. PMHx:  metastatic lung cancer, congestive heart failure, COPD on chronic oxygen at home, history of multiple thyroid nodules, tobacco use disorder, status post ICD placement, nonischemic cardiomyopathy. Reviewed chart, spoke with RN and pt . Pt with RUQ pain, nauseous- now on PCA Dilaudid. Pt hyponatremic, hyperkalemia-on fluid restriction and low potassium diet; receiving kayexalate.  states pt not eating much PTA; \"I had to force her to eat a little bit. \" Per  pt only consuming soda for past few days-  states \"no need to give her any meals because she is not going to eat it. \"  Noted temporal, clavicular wasting. Discussed trial of protein supplement; will send Nepro TID with meals which provides 1275 kcal, 57g protein meeting 80% caloric, 77% protein needs if 100% consumed.      Patient meets criteria for Severe Protein Calorie Malnutrition as evidenced by: ASPEN Malnutrition Criteria  Acute Illness, Chronic Illness, or Social/Enviornmental: Acute illness  Energy Intake: Less than/equal to 50% est energy req for greater than/equal to 5 days  Body Fat: Mild  Muscle Mass: Mild  ASPEN Malnutrition Score - Acute Illness: 8  Acute Illness - Malnutrition Diagnosis: Severe malnutrition. Palliative following; unsure of plan of care. RD to follow po intake, supplement acceptance, wt status, plan of care. Estimated Nutrition Needs:   Kcals/day: 1599 Kcals/day (4079-1489 kcal/day (HB x 1.4-1.6))  Protein: 74 g (74-79g/day (1.4-1.5g/kg))  Fluid: 1200 ml (FR)  Based On: [place]  Weight Used: Actual wt    Pt expected to meet estimated nutrient needs:  []   Yes     []  No [x] Unable to predict at this time    Nutrition Diagnosis:   1. Inadequate protein-energy intake related to poor po intake  as evidenced by pt with poor po intake PTA, refusing meals    2.  Increased nutrient needs (protein) related to metastic disease as evidenced by pt wth lung cancer; mets to lungs    Goals:      Pt will consume 25% meals, 1-2 supplements per day     Monitoring & Evaluation:    - Total energy intake, Liquid meal replacement   - Weight/weight change   -      Previous Nutrition Goals Met:   N/A  Previous Recommendations:    N/A    Education & Discharge Needs:   [x] None Identified   [] Identified and addressed    [] Participated in care plan, discharge planning, and/or interdisciplinary rounds        Cultural, Evangelical and ethnic food preferences identified: None    Skin Integrity: [x]Intact  []Other  Edema: [x]None []Other  Last BM: 2/19/2018  Food Allergies: [x]None []Other  Diet Restrictions: Cultural/Anabaptism Preference(s): None     Anthropometrics:    Weight Loss Metrics 2/21/2018 1/31/2018 1/8/2018 11/14/2017 10/3/2017 8/4/2017 4/19/2017   Today's Wt 116 lb 10 oz 113 lb 113 lb 116 lb 115 lb 116 lb 10 oz 120 lb   BMI 18.82 kg/m2 18.24 kg/m2 18.24 kg/m2 19.3 kg/m2 19.14 kg/m2 18.82 kg/m2 19.37 kg/m2      Weight Source: Standing scale (comment)  Height: 5' 6\" (167.6 cm),    Body mass index is 18.82 kg/(m^2).    ,     ,      Labs:    Lab Results   Component Value Date/Time    Sodium 123 (L) 02/21/2018 02:13 PM    Potassium 6.7 (HH) 02/21/2018 02:13 PM    Chloride 89 (L) 02/21/2018 02:13 PM    CO2 22 02/21/2018 02:13 PM    Glucose 122 (H) 02/21/2018 02:13 PM    BUN 55 (H) 02/21/2018 02:13 PM    Creatinine 2.40 (H) 02/21/2018 02:13 PM    Calcium 9.0 02/21/2018 02:13 PM    Magnesium 2.4 02/21/2018 03:59 AM    Phosphorus 6.5 (H) 02/21/2018 03:59 AM    Albumin 3.0 (L) 02/21/2018 03:59 AM     Lab Results   Component Value Date/Time    Hemoglobin A1c 5.0 02/16/2018 03:33 AM         Shay Rose RD

## 2018-02-21 NOTE — PROGRESS NOTES
Problem: Falls - Risk of  Goal: *Absence of Falls  Document Fay Fall Risk and appropriate interventions in the flowsheet.    Outcome: Progressing Towards Goal  Fall Risk Interventions:  Mobility Interventions: Communicate number of staff needed for ambulation/transfer, Patient to call before getting OOB, PT Consult for mobility concerns         Medication Interventions: Evaluate medications/consider consulting pharmacy, Patient to call before getting OOB, Teach patient to arise slowly    Elimination Interventions: Call light in reach, Patient to call for help with toileting needs, Toileting schedule/hourly rounds

## 2018-02-21 NOTE — PROGRESS NOTES
Problem: Pain  Goal: *Control of Pain  Outcome: Progressing Towards Goal  Teach pt to about numeric pain scale, hourly pain check, reminded pt to let nurses know when experience pain, review available pain meds with pt.

## 2018-02-21 NOTE — PROGRESS NOTES
Problem: Chronic Obstructive Pulmonary Disease (COPD)  Goal: *Oxygen saturation during activity within specified parameters  Outcome: Progressing Towards Goal  Assess patients breathing pattern, respiratory rate, breath sounds, oxygen saturation  Maintain oxygen saturation within specified parameters during rest and activity  Administer oxygen as ordered/needed    Goal: *Optimize nutritional status  Outcome: Progressing Towards Goal  Encourage patient to eat meals  Document percentage of meals eaten    Problem: Pain  Goal: *Control of Pain  Outcome: Progressing Towards Goal  Assess patients pain using numeric pain scale  Administer PRN pain medication as ordered/needed  Reassess pain after administering pain medication

## 2018-02-21 NOTE — PROGRESS NOTES
Advised Dr. Emerson Sam that radiology called and advised that they are not able to do paracentesis due to position and not enough fluid. He asked me to inform Dr. Freddy Coronel.   Paged Dr. Freddy Coronel

## 2018-02-21 NOTE — PROGRESS NOTES
Problem: Patient Education: Go to Patient Education Activity  Goal: Patient/Family Education  Outcome: Progressing Towards Goal  Reminded pt to call for help when need to use the bathroom because pain medication may have side effects of dizziness or light headedness. 0700- Noted pt did not get up to use the restroom at all for the shift, when asked pt stated \"SOB is getting so bad I didn't want to get up\" offered bedside commode and noted 25 mL hung color urine. Bladder scan showed 0 ml. Called , and received orders. Bedside shift change report given to Charli Beauchamp RN (oncoming nurse) by Abigail Honeycutt (offgoing nurse).  Report included the following information SBAR, Kardex, ED Summary, Intake/Output, Recent Results and Cardiac Rhythm  ST.

## 2018-02-21 NOTE — PALLIATIVE CARE
Palliative Medicine Social Work    Dr. Narayan Roa and I met with patient; her , Arleth Gutiérrez ([phone]); and two children, Donna Morris ([phone]) and Joe Varma ([phone]). Patient was alert, oriented. Kept her eyes mostly closed during visit, but engaging. She reports some improvement with her nausea and not eager to make changes. Family, however, a bit concerned that pain in escalating with PRN doses and she is having to \"catch up\" on managing once dose arrives. Discussed option of PCA to give her more control and perhaps cause her to use less. Discussed plan for surgery to come by and assess pain and options should she have an obstruction. Patient in agreement with PCA with family's encouragement. Daughter inquired more about our services; what the plans would be once she is closer to being discharged. She asked if hospice would be an appropriate service. We informed that it would depend on goals. Discussed that Oncology could talk more with them about options. Noted in the chart that there was still some question about effectiveness of Opdivo; that liver mets may have been present prior to first dose and too early to make a determination about whether a second dose is feasible. Informed that she could not be enrolled in hospice if treatments still being pursued. Discussed that our team would be glad to review and talk more about hospice as an option for now or down the road; also the importance of talking more about AMD and code status. Son, Donna Morris, followed us out in the hallway to relate that family did not really want to continued putting her through things that would not make a difference in her quality. He informed that she has already been given a prognosis of 3-6 months; they accept her life expectancy is limited and want to make her time as meaningful as possible. He would like to meet again to talk about hospice as an option.   Agreed to continue with work-up for pain to see what interventions may be possible; meet on Friday when more is known. Thank you for the opportunity to be involved in the care of Shante Hoskins and her family. Jahaira Parks, JODY, [facility]-  Palliative Medicine   [employer] ® ACP Facilitator   [phone]

## 2018-02-21 NOTE — CONSULTS
[id number] [facility] Surgery at [address] [facility] Consultation    Admit Date: 2/16/2018  Reason for Consultation: abdominal pain    HPI:  Caridad Dockery is a 74 y.o. female whom we are asked to see in consultation by Dr. Marlene Powers for abdominal pain. She states she began having pain in the right side of her back on Friday. Over the weekend her discomfort moved to her RUQ and has been increasing in intensity. Her family states she has not eaten in 2.5 days. She denies vomiting. She's had a cholecystectomy in the past.     She was admitted to [facility] on 02/16/2018 for increasing shortness of breath. 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 She had documented recurrence in 06/2017 staged at T1bN2 stage III and was given carboplatin and Taxol followed by consolidative stereotactic radiation therapy to the right upper lung lesion completed in 10/2017 at the [facility].  She was diagnosed with recurrent disease in January with lung metastases and was started on Opdivo 3 weeks ago and has received 1 treatment. Per Oncology it is unclear at this point if she has had a good response to Opdivo. However, it cannot be given to inpatients and so therapy is on hold until she is well enough for discharge. Additional history significant for severe COPD, home oxygen dependent, osteoporosis, cholelithiasis, thyroid nodules, nonischemic cardiomyopathy. Surgical history includes defibrillator placement, tubal ligation, shoulder surgery, and hysterectomy. At present she continues to complain of pain which is most prominent in her RUQ.       CT 2/19/18:  FINDINGS:   LUNG BASES: Emphysema  INCIDENTALLY IMAGED HEART AND MEDIASTINUM: Unremarkable. LIVER: Multiple hepatic masses, larger than suggested on the January PET/CT.   GALLBLADDER: Surgically absent. SPLEEN: No mass. PANCREAS: No mass or ductal dilatation. ADRENALS: Unremarkable. KIDNEYS: No mass, calculus, or hydronephrosis. Left renal hypodensities. STOMACH: Unremarkable. SMALL BOWEL: No dilatation or wall thickening. COLON: No dilatation or wall thickening. APPENDIX: Unremarkable. Axial images 60-61. PERITONEUM: Moderate pelvic ascites with a density measurement of 15.8. Stone 5  mm calculus in left posterior pelvis (image 73). Moderate ascites also anterior to the liver. RETROPERITONEUM: No lymphadenopathy or aortic aneurysm. REPRODUCTIVE ORGANS: Prior hysterectomy  URINARY BLADDER: No mass or calculus. BONES: No destructive bone lesion.     IMPRESSION:  Mildly complex ascites in the pelvis and surrounding the liver (image 73). This  contains a stone but site of perforation is not identified and the fluid is not  as dense as expected. Clinical correlation and follow-up recommended  Interval progression of the hepatic metastatic disease  Emphysema     2/16/18 Abd US:  1. Liver is enlarged and heterogeneous in echotexture with multiple masses most  likely metastatic disease. There is no ductal dilatation. WBC 24, Hgb 13.1, Na 124, Potassium 6.1, Cr 1.83, Phos 6.5, , , Alk Phos 242, Lactic acid 4.2. Her family at the bedside is asking for clarification of the treatment plan. Her comfort is of utmost importance, and they want to \"avoid poking and prodding if it isn't going to help. \"      Patient Active Problem List    Diagnosis Date Noted    Acute bronchitis 08/02/2017    Dehydration 08/02/2017    SOB (shortness of breath) 08/02/2017    Panlobular emphysema ([address] 04/19/2017    H/O: lung cancer 11/16/2016    NICM (nonischemic cardiomyopathy) ([address] 11/16/2016    S/P ICD (internal cardiac defibrillator) procedure 11/04/2016    Heart failure, left, with LVEF <=30% ([address] 96/27/4027    Systolic heart failure ([address] 11/16/2015    Multiple thyroid nodules 11/15/2015    Tobacco use disorder 11/15/2015    Hypoxia 11/13/2015     Past Medical History:   Diagnosis Date    Cancer ([facility]     Lung CA    Chronic obstructive pulmonary disease (HCC)     Emphysema     Heart failure (HCC)       Past Surgical History:   Procedure Laterality Date    CHEST SURGERY PROCEDURE UNLISTED Bilateral 2007/2009    lung lobe resection    HX CHOLECYSTECTOMY      HX GYN      GEETA    HX ORTHOPAEDIC      shoulder    HX OTHER SURGICAL      pacemaker/defib    INS PPM/ICD LED SING ONLY  11/4/2016           Social History   Substance Use Topics    Smoking status: Former Smoker     Packs/day: 1.00     Years: 40.00     Quit date: 2/6/2017    Smokeless tobacco: Former User     Quit date: 10/26/2016    Alcohol use No      History reviewed. No pertinent family history. Prior to Admission medications    Medication Sig Start Date End Date Taking? Authorizing Provider   senna (SENNA) 8.6 mg tablet Take 2 Tabs by mouth daily. Yes Historical Provider   omeprazole (PRILOSEC) 20 mg capsule Take 20 mg by mouth daily. Yes Historical Provider   magnesium hydroxide (Snapguide MILK OF MAGNESIA) 400 mg/5 mL suspension Take 30 mL by mouth daily as needed for Constipation. Yes Historical Provider   furosemide (LASIX) 20 mg tablet Take 20 mg by mouth daily as needed (SOB, Edema as directed by Cardiologist). Yes Historical Provider   lisinopril (PRINIVIL, ZESTRIL) 20 mg tablet Take 1 Tab by mouth daily. 1/18/18  Yes Bam Thomson NP   tiotropium (SPIRIVA WITH HANDIHALER) 18 mcg inhalation capsule Take 1 Cap by inhalation every evening. 1/18/18  Yes Bam Thomson NP   budesonide-formoterol (SYMBICORT) 160-4.5 mcg/actuation HFAA Take 2 Puffs by inhalation two (2) times a day. 1/18/18  Yes Bam Thomson NP   albuterol (PROVENTIL HFA, VENTOLIN HFA, PROAIR HFA) 90 mcg/actuation inhaler Take 2 Puffs by inhalation every six (6) hours as needed for Wheezing.  1/18/18  Yes Bárbara Hanley NP albuterol (PROVENTIL VENTOLIN) 2.5 mg /3 mL (0.083 %) nebulizer solution 3 mL by Nebulization route every four (4) hours as needed for Wheezing. 1/18/18  Yes Oscar Thomson NP   spironolactone (ALDACTONE) 25 mg tablet Take 1 Tab by mouth daily. 11/14/17  Yes Marvin Gibbs NP   azithromycin (ZITHROMAX) 250 mg tablet Take 1 Tab by mouth daily. 11/14/17  Yes Marvin Gibbs NP   0.9% sodium chloride inhalation Take 5 mL by inhalation two (2) times a day. 10/3/17  Yes Oscar Thomson NP   metoprolol succinate (TOPROL-XL) 50 mg XL tablet Take 1 Tab by mouth daily. 10/3/17  Yes Oscar Thomson NP   ondansetron hcl (ZOFRAN) 8 mg tablet Take 8 mg by mouth every eight (8) hours as needed for Nausea. Yes Historical Provider   prochlorperazine (COMPAZINE) 10 mg tablet Take 10 mg by mouth every six (6) hours as needed for Nausea. Yes Historical Provider   aspirin 81 mg chewable tablet Take 1 Tab by mouth daily.  10/29/16  Yes Louise Ji V, NP     Current Facility-Administered Medications   Medication Dose Route Frequency    0.9% sodium chloride infusion  75 mL/hr IntraVENous CONTINUOUS    albuterol (PROVENTIL VENTOLIN) nebulizer solution 2.5 mg  2.5 mg Nebulization Q6H PRN    HYDROmorphone (PF) (DILAUDID) injection 1 mg  1 mg IntraVENous Q3H PRN    predniSONE (DELTASONE) tablet 40 mg  40 mg Oral DAILY WITH BREAKFAST    ondansetron (ZOFRAN) injection 4 mg  4 mg IntraVENous Q4H PRN    senna-docusate (PERICOLACE) 8.6-50 mg per tablet 2 Tab  2 Tab Oral DAILY    prochlorperazine (COMPAZINE) with saline injection 5 mg  5 mg IntraVENous Q6H PRN    levoFLOXacin (LEVAQUIN) tablet 250 mg  250 mg Oral Q24H    oxyCODONE IR (ROXICODONE) tablet 5 mg  5 mg Oral Q4H PRN    albuterol-ipratropium (DUO-NEB) 2.5 MG-0.5 MG/3 ML  3 mL Nebulization Q6H RT    aspirin chewable tablet 81 mg  81 mg Oral DAILY    metoprolol succinate (TOPROL-XL) XL tablet 50 mg  50 mg Oral DAILY    sodium chloride (NS) flush 5-10 mL  5-10 mL IntraVENous Q8H    sodium chloride (NS) flush 5-10 mL  5-10 mL IntraVENous PRN    pantoprazole (PROTONIX) tablet 40 mg  40 mg Oral DAILY    glucose chewable tablet 16 g  4 Tab Oral PRN    dextrose (D50W) injection syrg 12.5-25 g  12.5-25 g IntraVENous PRN    glucagon (GLUCAGEN) injection 1 mg  1 mg IntraMUSCular PRN    insulin lispro (HUMALOG) injection   SubCUTAneous AC&HS    arformoterol (BROVANA) neb solution 15 mcg  15 mcg Nebulization BID RT    And    budesonide (PULMICORT) 500 mcg/2 ml nebulizer suspension  500 mcg Nebulization BID RT     No Known Allergies       Subjective:     Review of Systems:    A comprehensive review of systems was negative except for that written in the History of Present Illness. Objective:     Blood pressure 114/72, pulse 96, temperature 97.5 °F (36.4 °C), resp. rate 20, height 5' 6\" (1.676 m), weight 52.9 kg (116 lb 10 oz), SpO2 96 %, not currently breastfeeding. Temp (24hrs), Av.6 °F (36.4 °C), Min:97.4 °F (36.3 °C), Max:97.9 °F (36.6 °C)      Recent Labs      18   0359  18   0344  18   0406   WBC  24.0*  18.5*  14.9*   HGB  13.1  12.3  11.8   HCT  38.5  35.9  34.4*   PLT  122*  119*  127*     Recent Labs      18   0359  18   0344  18   1200  18   0406   NA  124*  124*  125*  121*   K  6.1*  4.9  5.3*  5.6*   CL  86*  88*  89*  86*   CO2  25  23  23  25   GLU  84  69  100  78   BUN  46*  23*  22*  23*   CREA  1.83*  0.98  1.10*  1.14*   CA  9.6  9.2  9.1  9.3   MG  2.4  1.7   --   1.6   PHOS  6.5*   --    --    --    ALB  3.0*  3.0*   --    --    TBILI  1.0  0.8   --    --    SGOT  458*  290*   --    --    ALT  382*  260*   --    --      No results for input(s): AML, LPSE in the last 72 hours.       Intake/Output Summary (Last 24 hours) at 18 1138  Last data filed at 18 0727   Gross per 24 hour   Intake                0 ml   Output               25 ml   Net              -25 ml       Date 18 0700 - 02/22/18 0659   Shift 2751-1839 5094-7796 [id number] 24 Hour Total   I  N  T  A  K  E   Shift Total  (mL/kg)       O  U  T  P  U  T   Urine  (mL/kg/hr) 25   25    Shift Total  (mL/kg) 25  (0.5)   25  (0.5)   Weight (kg) 52.9 52.9 52.9 52.9     _____________________  Physical Exam:     General:  Sleepy, but awakens and is conversant. No acute distress. Eyes:   PERRL, EOMs intact. Sclera clear. Throat: Lips, mucosa, and tongue normal.   Neck: Supple, symmetrical, trachea midline. Lungs:   Clear to auscultation bilaterally. Heart:  Regular rate and rhythm. Abdomen:   Hypoactive BS, tense, tender over RUQ. Extremities: Extremities normal, atraumatic, no cyanosis or edema. Skin: Skin color, texture, turgor normal. No rashes or lesions. Assessment:   Principal Problem:    SOB (shortness of breath) (8/2/2017)    abdominal pain        Plan:     D/W Dr Annie Petersen  No acute surgical indication at present  Finding of pelvic stone is possibly a stone that was dropped during previous cholecystectomy. Further plan per Dr. Annie Petersen    Thank you for allowing us to participate in the care of this patient. Total time spent with patient: 26 minutes. Signed By: Heriberto Traylor NP     February 21, 2018      Pt. Seen and examined. 4-5 week history of pain and nausea that got significantly worse over the past few days. History of metastatic lung cancer with worsening abdominal pain. CT scan done 2 days ago showed large amount of hepatic Mets. And no sign of bowel obstruction  Pt has decreased appetite. Gen- looks uncomfortable  Lungs-CTA  H-RRR  Abd- mild tenderness in the upper abdomen. There is fullness consistent with enlarged liver. No peritonitis. Wbc elevated- 24K on steroid  Lactate 4. 2  RUQ pain -suspect that this is all related to her cancer- I do not believe that there is an operative fix for her pain. This was discussed with family at bedside.    May want to consider GI for possible ulcer though would probably just keep her on the PPI. Susanna Dakota

## 2018-02-22 PROBLEM — C34.90 METASTATIC PRIMARY LUNG CANCER (HCC): Status: ACTIVE | Noted: 2018-01-01

## 2018-02-22 NOTE — HOSPICE
[facility]  [phone]    Patient Name: Kilo Shine  YOB: 1950  Age: 74 [facility]. [facility]      Call back to Dr. Angi Claudio office and spoke to Charlie Leigh her nurse. She states that Dr. Rhina Chinchilla defers to our medical director Dr. Joanne Olson to be attending. She will relay that message to [facility] team.    Thank you for the opportunity to be of service to this patient.     Sophie Palomino, [facility]

## 2018-02-22 NOTE — PROGRESS NOTES
7:45 - Shift change report received from [employer] (off going nurse) and given to Tana Salcido (oncoming nurse)  8:00 - Patient assessment, change in patient condition noted in chart, PCA rate verified by Bindu Leggett RN and Lori Baker RN  8:20 - Tom met with patient and family, patient and family expresses they would like to transition to comfort care, patient and family expresses patient would like to be a DNR, [facility] called to notified   9:25 - patient and family are rounded on. Patient resting in bed  10:31 - Palliative meet with patient and family, comfort is primary focus now. PCA rate change - add basal 0.2mg/h and 0.4mg IV every 10 min demand (per Palliative orders)  11:40 bereavement cart ordered       12:16 - Hospice RN meeting with patient and family. Patient and family are rounded on. Patient resting in bed, RR has decreased to 7 breaths per minute       13:14 patient and family are rounded on. Patient resting in bed  14:00 Pastoral Care meeting with family and patient  15:00 Patient and Family rounded on, patient resting  16:00 -  17:00 - Kuo catheter placed. Dilaudid PCA changed to basal rate 0.4mg/hr, 0mg bolus dose, 6 minute lockout interval, 99mg four hour dose limit. 17:30, Report given and transfered, see note below    TRANSFER - OUT REPORT:    Verbal report given to [address] (name) on [facility]  being transferred to [facility](unit) for change in patient condition(hospice)       Report consisted of patients Situation, Background, Assessment and   Recommendations(SBAR). Information from the following report(s) SBAR, Kardex, Procedure Summary, Intake/Output, MAR and Recent Results was reviewed with the receiving nurse. Lines:   Left AC 20 sherwin, Right AC 22 sherwin. Dilaudid running through left [facility]    Opportunity for questions and clarification was provided.       Patient transported with:   Registered Nurse  Tech

## 2018-02-22 NOTE — PROGRESS NOTES
-Hematology / Oncology ([facility]) -  -Primary Oncologist- Dr Edward De Los Santos  -Lottie-  Less responsive, pt. And family have decided on comfort measures    -O-      Patient Vitals for the past 24 hrs:   Temp Pulse Resp BP SpO2   02/22/18 0320 97.7 °F (36.5 °C) 99 20 117/75 100 %   02/22/18 0127 - - - - 95 %   02/21/18 2318 97.7 °F (36.5 °C) 98 22 121/77 98 %   02/21/18 2028 - - - - 99 %   02/21/18 1922 97.7 °F (36.5 °C) (!) 101 22 116/77 98 %   02/21/18 1723 97.6 °F (36.4 °C) 91 24 117/63 -   02/21/18 1423 - - - - 95 %        Gen: sedated  Chest: bilateral breath sounds coarse  Cardiac: rrr  Abd. distended    -Labs-    Recent Labs      02/22/18   0316  02/21/18 2008 02/21/18   1413  02/21/18   0359  02/20/18   0344   WBC  23.4*   --    --   24.0*  18.5*   HGB  11.9   --    --   13.1  12.3   PLT  105*   --    --   122*  119*   ANEU   --    --    --    --   16.3*   NA  124*   --   123*  124*  124*   K  6.7*  6.3*  6.7*  6.1*  4.9   GLU  138*   --   122*  84  69   BUN  65*   --   55*  46*  23*   CREA  3.12*   --   2.40*  1.83*  0.98   ALT   --    --    --   382*  260*   SGOT   --    --    --   458*  290*   TBILI   --    --    --   1.0  0.8   AP   --    --    --   242*  223*   CA  8.8   --   9.0  9.6  9.2   MG  2.6*   --    --   2.4  1.7   PHOS   --    --    --   6.5*   --        -Imaging-       -Assessment + Plan-     1. Metastatic non-small cell lung cancer. the pt. And her family have decided to move forward with comfort measures, she looks like she should qualify for in-pt hospice, she appears comfortable at present, would d/c all non essential meds and stop blood draws, we will continue to follow.     Shaun Garza MD

## 2018-02-22 NOTE — HOSPICE
[facility]  [phone]    Social Work Admission Note  Patient Name: Nikhil Douglass  YOB: 1950  Age: 74 y.o. Date of Visit: 02/22/18  Facility of Care: [facility]  Patient Room: [address] Attending: Jumana Duran MD  Hospice Diagnosis: SOB (shortness of breath)  SOB (shortness of breath)    Level of Care:    []  GIP    []  Respite   [x]  Routine    NARRATIVE   This MSW and Benjamin Anna Rn met with pts  Morris Capellan and son Robson Metcalf for initial SW assessment. Patient is 74year old CF with a hospice diagnosis of met lung ca with comorbid COPD, and CHF. Patient is , she and  Morris Capellan have 2 children son Robson Metcalf and daughter Adalgisa Thompson. Patient appeared comfortable with agonal breathing.  is coping adaptively with the support of his  Children and family. There were several other family members in the room.  reported other family members are coming from out of town. Family is holding bedside dixon.   is now staying at pts bedside.  reported he is exhausted. MSW provided emotional  support and supportive counseling for . Son Robson Metcalf appears anxious. He was flush and restless. MSW assessed coping. Daughter reported   Brother Robson Metcalf is anxious and restless at base line. The family is accepting, but sorrowful. MSW provided emotional support for family at beside. MSW shared Bereavement services information.      ADVANCE CARE PLANNING    Code Status: DNR  Durable DNR: _x Yes  _ No  Advance Care Planning 2/16/2018   Patient's Healthcare Decision Maker is: Legal Next of Whitney 71   Primary Decision Maker Name Emma Mederos   Primary Decision Maker Phone Number [phone], [phone]   Primary Decision Maker Relationship to Patient Spouse   Secondary Decision Maker Name -   Secondary Decision [address] Phone Number -   Secondary Decision Maker Relationship to Patient -   Confirm Advance Directive None   Patient Would Like to Complete Advance Directive No Relationship Status:  []  Single     [x]        []      []  Domestic Partner     []  /  []  Common Law  []    []  Unknown    If in a relationship, name of partner/spouse: Arleth Gutiérrez  Duration of relationship: 55 years    Uatsdin: Bahai     Home: [place]   Resources Provided: Bereavement      Social Work Initial Assessment     Gender:  female    Race/Ethnicity: (phyllis all that apply)  []  [place] ([facility]) or [place] Native  []    []  Black or   []   or   []  Native [facility] [address] or [place]  [x]  White  []  Unknown      Service:    []  Yes   [x]  No       []  Unknown  Appropriate for Pinning Ceremony:   []  Yes      []  No  Is patient using VA benefits?    []  Yes      []  No     Primary Language: ENGLISH  []   Needed  []   utilized during visit    Ability to express thoughts/needs/feelings  []  Expressed thoughts/feelings/needs without difficulty  []  Requires extra time and cuing  []  Speech limited single words  []  Uses only gestures (eye, blinking eye or head movement/pointing)  []  Unable to express thoughts/feelings/needs (speech unintelligible or inappropriate)  [x]  Unresponsive  Notes:      Mental Status:  []  Alert-oriented to:     []  Person     []  Place     []  Time  []  Comatose-responds to:    []   Verbal stimuli    []  Tactile stimuli    []  Painful stimuli  []  Forgetful  []  Disoriented/Confused  []  Lethargic  []  Agitated  [x]  Other (specify):  Unresponsive   Notes:      Patients description of Illness/Current Health Status:    [x]  Patient unable to discuss  []  Patient unwilling to discuss  []  (Specify)        Knowledge/Understanding of Disease Process  Patient:    []  Demonstrates knowledge/understanding of disease process   []  Demonstrates knowledge/understanding of treatment plan   []  Demonstrates knowledge/understanding of prognosis   []  Demonstrates acceptance of prognosis   []  Demonstrates knowledge/understanding of resuscitation status   [x]  Other (specify) Unresponsive  Caregiver:   [x]  Demonstrates knowledge/understanding of disease process   [x]  Demonstrates knowledge/understanding of treatment plan   [x]  Demonstrates knowledge/understanding of prognosis   [x]  Demonstrates acceptance of prognosis   [x]  Demonstrates knowledge/understanding of resuscitation status   []  Other (specify)  Notes:      Patients living arrangement/care setting:  Use the PRIOR COLUMN when the PATIENTS current health status necessitated a change in his/her primary residence. Prior Current Response              []             []    Patients own home/residence              []             []    Home of family member/friend              []             []    Boarding home              []             []    Assisted living facility/detention center              [x]             []    Hospital/Acute care facility              []             []    Skilled nursing facility              []             []    Long term care facility/Nursing home              []             [x]    Hospice in Patient      Primary Caregiver:  []  No Primary Caregiver  Name of Primary Caregiver: VAN  Relationship or Primary Caregiver:    [x]  Spouse/Significant other       []  Natural Child        []  Step child       []  Sibling   []  Parent   []  Friend/Neighbor   []  Community/Yarsanism Volunteer   []  Paid help   []  Other (specify):___________  Notes:       Family members/Significant others:  Name: Macarenajai Temple  Relationship: [phone]  Phone Number:  Actively involved in care? [x]  Yes  []  No    Name:AUSTIN   Relationship: DAUGHTER   Phone Number:  Actively involved in care? [x]  Yes  []  No    Name: JESICA   Relationship: SIN  Phone Number:  Actively involved in care?   [x]  Yes  []  No    Social support systems: (select ONE best description)  [x]  Excellent social support system which includes three or more family members or friends  []  Good social support system which includes two or less members or friends  []  Halima Begum Avdeb support which includes one family member or friend  []  Poor social support; no family members or friends; basically ALONE  Notes:      Emotional Status: (phyllis all that apply)    Patient Caregiver Response                 [x]                [x]    Mood/Affect stable and appropriate                   []                []    Angry                 []                [x]    Anxious SON JESICA                 []                []    Apprehensive                 []                []    Avoidant                 []                []    Clinging                 []                []    Depressed                 []                []    Distraught                 []                []    Elated                 []                []    Euphoric                 []                []    Fearful                 []                []    Flat Affect                 []                []    Helpless                 []                []    Hostile                 []                []    Impulsive                 []                []    Irritable                 []                []    Labile                 []                []    Manic                 []                []    Restlessness                 []                [x]    Sad                 []                []    Suspicious                 []                []    Tearful                 []                []    Withdrawn     Notes:     Coping Skills (strengths/weakness):    Patient: Coping Skills (strength/weakness):   Unresponsive   Family/caregiver (strength/weakness): GOOD FAMILY SUPPORT AND EXTENDED FAMILY SUPPORT, ACCEPTING, SON IS ANXIOUS AT BASELINE   [place] of care (phyllis all that apply):     [x]  No burden evident   []  Family must administer medications   []  Illness causing financial strain   []  Family/Support feels overwhelmed   []  Family/Support sleep disturbed with patients care   []  Patients care causes extra physical stress  of death  []  Illness causes changes in family lifestyle  []  Illness impacting family/support employment  []  Family experiencing increased time demands  []  Patients behavior endangers family  []  Denial of patients illness  []  Concern over outcome of illness/fear  []  Patients behavior embarrassing to family   Notes:      Risk Factors: (phyllis all that apply):    [x]  No burden evident   []  Alcohol abuse  []  Financial resources inadequate to meet basic needs (food/house/etc)  []  Financial resources inadequate to meet health care needs (supplies/equipment/medications)  []  Food/nutrition resources inadequate  []  Home environment unsafe/inadequate for home care  []  Homicidal risk  []  Lives alone or without concerned relatives  []  Multiple medications/complex schedule  []  Physical limitations increase likelihood of falls  []  Plan of care/treatments complicated  []  Substance use/abuse  []  Suicidal risk  []  Visual impairment threatens safety/ability to perform self-care  []  Other (specify):     Abuse/Neglect (actual/potential risks):  [x]  No signs of abuse/neglect  []  History of abuse/neglect                 []  OGKNLYAQ          []  Sexual  []  History of domestic violence  []  Lacks adequate physical care  []  Lacks emotional nurturing/support  []  Lacks appropriate stimulation/cognitive experiences  []  Left alone inappropriately  []  Lacks necessary supervision  []  Inadequate or delayed medical care  []  Unsafe environment (i.e guns/drug use/history of violence in the home/etc.)  []  Bruising or other physical signs of injury present  []  Other (specify):  Notes:   []  Refer to child/adult protective services      Current Sources of Stress (in Addition to Current Illness):   [x]  None reported  []  Bills/Debt    []  Career/Job change    []   (short term)    []   (long term)    []  Death of a child (recent)    []  Death of a parent (recent)   []  Death of a spouse (recent)   []  Employment status changed   []  Family discord    []  Financial loss/Inadequate inther (specify):come  []  Job loss  []  Legal issues unresolved  []  Lifestyle change  []  Marital discord  []  Marriage within the last year  []  Paperwork (insurance/legal/etc) overwhelming  []  Separation/Divorce  []  Other (specify):  Notes:      Current [employer] Being Utilized     1. Interventions/Plan of Care     1. Assess social and emotional factors related to coping with end of life issues  2. Community resource planning/referral   3. Relocation to different care setting if/when symptoms stabilize  4.      Discharge Planning     [facility] [place]  MSW Assessment Completed by: Cecille Kelley  02/22/18    Time In:2;15 PM      Time Out:3;15 PM

## 2018-02-22 NOTE — PROGRESS NOTES
Spiritual Care Assessment/Progress Notes    Ruslan Styles [id number]  [id number]    1950  74 y.o.  female    Patient Telephone Number: [phone] (home)   Restorationism Affiliation: Angela Brooks   Language: English   Extended Emergency Contact Information  Primary Emergency Contact: Valorie Phone: [phone]  Mobile Phone: [phone]  Relation: Spouse  Secondary Emergency Contact: [address] Phone: [phone]  Mobile Phone: [phone]  Relation: Daughter   Patient Active Problem List    Diagnosis Date Noted    Acute bronchitis 08/02/2017    Dehydration 08/02/2017    SOB (shortness of breath) 08/02/2017    Panlobular emphysema ([facility] [address] 04/19/2017    H/O: lung cancer 11/16/2016    NICM (nonischemic cardiomyopathy) ([facility] [address] 11/16/2016    S/P ICD (internal cardiac defibrillator) procedure 11/04/2016    Heart failure, left, with LVEF <=30% ([facility] [address] 28/86/6321    Systolic heart failure ([facility] [address] 11/16/2015    Multiple thyroid nodules 11/15/2015    Tobacco use disorder 11/15/2015    Hypoxia 11/13/2015        Date: 2/22/2018       Level of Restorationism/Spiritual Activity:  [x]         Involved in alexis tradition/spiritual practice    []         Not involved in alexis tradition/spiritual practice  []         Spiritually oriented    []         Claims no spiritual orientation    []         seeking spiritual identity  []         Feels alienated from Taoism practice/tradition  []         Feels angry about Taoism practice/tradition  [x]         Spirituality/Taoism tradition is a resource for coping at this time.   []         Not able to assess due to medical condition    Services Provided Today:  []         crisis intervention    []         reading Scriptures  [x]         spiritual assessment    []         prayer  [x]         empathic listening/emotional support  []         rites and rituals (cite in comments)  []         life review     [] Yazidi support  []         theological development   []         advocacy  []         ethical dialog     []         blessing  []         bereavement support    []         support to family  [x]         anticipatory grief support   []         help with AMD  []         spiritual guidance    []         meditation      Spiritual Care Needs  [x]         Emotional Support  []         Spiritual/Uatsdin Care  []         Loss/Adjustment  []         Advocacy/Referral                /Ethics  []         No needs expressed at               this time  []         Other: (note in               comments)  [address]  []         Follow up visits with               pt/family  []         Provide materials  []         Schedule sacraments  []         Contact Community               Clergy  [x]         Follow up as needed  []         Other: (note in               comments)     Visited pt at request of Dr. Marcelle Carlos. Pt is at EOL and was resting peacefully during visit. Several family members are in hospital with pt's , daughter, son and a niece at bedside. The family is processing anticipatory grief incumbent upon such a loss. Pt is a person of Maxx Benders alexis and an active [facility] [address] will follow as needed.   Chaplain Jay MDiv, MS, [facility]  [phone]

## 2018-02-22 NOTE — PROGRESS NOTES
Palliative Medicine Consult  Sammy: [phone]    Patient Name: Ruchi Santos  YOB: 1950    Date of Initial Consult: 2-20-18  Reason for Consult: Overwhelming sx   Requesting Provider: Zander Duran   Primary Care Physician: No primary care provider on file. SUMMARY:   Ruchi Santos \"Aleta\"  is a 74 y.o. with a past history of lung cancer dx in 2007 s/p lobectomy w/ recurrence 6/2017 s/p chemo, radiation, currently on immunotherapy (followed by [facility]), severe COPD, congestive HF due to NICM s/p AICD and followed by Advanced heart failure team, who was admitted on 2/16/2018 with progressive SOB, hyponatremia. CT abd/pelvis showing progression of disease to liver. [facility] has eval here- plan is to f/u as outpatient to [address] [place] [address]. Current medical issues leading to Palliative Medicine involvement include:sx management- having acute RUQ pain over the past couple days. Decision by pt and family 2/22/18 to move to comfort measures only. Pt now w/ OLYA, electrolyte derangements incl hyperkalemia. Does not want dialysis. Social: Pt  to Sujey Vega, they have a son Shabbir Grant and daughter Maikel Arnold. Not taking opioids at home although has taken in past during cancer tx. PALLIATIVE DIAGNOSES:   1. RUQ pain - severe  2. Nausea, possibly due to pain medications  3. SOB, on O2 at home  4. Fatigue due to medical conditions   5. Recurrent lung CA w/ mets to liver  6. Severe COPD  7. CHF  8. OLYA w/ hyperkalemia, worsening        PLAN:   1. Note all events that have occurred since my visit yest- appreciate primary team and nephrology talking to pt and family about all possible interventions incl dialysis. Family and pt clear that they wish to pursue comfort measures only, no dialysis, no labs. 2. Meet w/ pt, family along w/ [facility] INPATIENT LCSW. All are clear that comfort is the primary goal now, no further testing, labs.  Talk about what this shift looks like in the hospital.   3. Will talk w/ hospice team, as pt w/ acute worsening of renal function, SOB, and her PPS has declined from 60 upon admission to 20-30 in 2 days. 4. Cont Dilaudid PCA, but as not alert enough anymore will add basal- thus 0.2mg/h and 0.4mg IV every 10 min demand. Adding Dexamethasone for SOB, nausea- as cannot take Prednisone po.   5. Good mouth care, keep fan on face, support family. Pastoral care notified. 6. Communicated plan of care with: Palliative IDT; Heather MCQUEEN; Dr Telma Anderson ; chaplain Yuko Joyce OF CARE / TREATMENT PREFERENCES:     GOALS OF CARE:  Patient/Health Care Proxy Stated Goals:  (Sx relief)      TREATMENT PREFERENCES:   Code Status: DNR    Advance Care Planning:  Advance Care Planning 2/16/2018   Patient's Healthcare Decision Maker is: Legal Next of Kin   Primary Decision Maker Name Robinson Ruff   Primary Decision Maker Phone Number [phone], [phone]   Primary Decision Maker Relationship to Patient Spouse   Secondary Decision Maker Name -   Secondary Decision [address] Phone Number -   Secondary Decision Maker Relationship to Patient -   Confirm Advance Directive None   Patient Would Like to Complete Advance Directive No       Medical Interventions: Full interventions   Other Instructions: Other:    As far as possible, the palliative care team has discussed with patient / health care proxy about goals of care / treatment preferences for patient. HISTORY:       HPI/SUBJECTIVE:    The patient is:   [x] Verbal and participatory  [] Non-participatory due to:     Labs, note, MAR reviewed. Cr rising to 3.12 today and with hyperkalemia. Pt quite somnolent, but opens eyes to voice and gentle touch. No grimacing or nonverbal pain.      Clinical Pain Assessment (nonverbal scale for severity on nonverbal patients):   Clinical Pain Assessment  Severity: 5  Location: RUQ   Character: sharp  Duration: a coulple days  Effect: decr function   Factors: worse w/ movement, better w/ medication   Frequency: constant Face  [x] 0   No particular expression or smile  [] 1   Occasional grimace, tearing, frowning, wrinkled forehead  [] 2   Frequent grimace, tearing, frowning, wrinkled forehead    Activity (movement)  [x] 0   Lying quietly, normal position  [] 1   Seeking attention through movement or slow, cautious movement  [] 2   Restless, excessive activity and/or withdrawal reflexes    Guarding  [x] 0   Lying quietly, no positioning of hands over areas of body  [] 1   Splinting areas of the body, tense  [] 2   Rigid, stiff    Physiology (vital signs)  [x] 0   Stable vital signs  [] 1   Change in any of the following: SBP > 20mm Hg; HR > 20/minute  [] 2   Change in any of the following: SBP > 30mm Hg; HR > 25/minute    Respiratory  [x] 0   Baseline RR/SpO2, compliant with ventilator  [] 1   RR > 10 above baseline, or 5% drop SpO2, mild asynchrony with ventilator  [] 2   RR > 20 above baseline, or 10% drop SpO2, asynchrony with ventilator    Duration: for how long has pt been experiencing pain (e.g., 2 days, 1 month, years)  Frequency: how often pain is an issue (e.g., several times per day, once every few days, constant)     FUNCTIONAL ASSESSMENT:     Palliative Performance Scale (PPS):  PPS: 60       PSYCHOSOCIAL/SPIRITUAL SCREENING:     Palliative IDT has assessed this patient for cultural preferences / practices and a referral made as appropriate to needs (Cultural Services, Patient Advocacy, Ethics, etc.)    Advance Care Planning:  Advance Care Planning 2/16/2018   Patient's Healthcare Decision Maker is: Legal Next of Whitney 71   Primary Decision Maker Name Sujatha Long   Primary Decision Maker Phone Number [phone], [phone]   Primary Decision Maker Relationship to Patient Spouse   Secondary Decision Maker Name -   Secondary Decision Maker Phone Number -   Secondary Decision Maker Relationship to Patient -   Confirm Advance Directive None   Patient Would Like to Complete Advance Directive No       Any spiritual / Judaism concerns:  [] Yes /  [x] No    Caregiver Burnout:  [] Yes /  [x] No /  [] No Caregiver Present      Anticipatory grief assessment:   [x] Normal  / [] Maladaptive       ESAS Anxiety: Anxiety: 0    ESAS Depression: Depression: 0        REVIEW OF SYSTEMS:     Positive and pertinent negative findings in ROS are noted above in HPI. The following systems were [x] reviewed / [] unable to be reviewed as noted in HPI  Other findings are noted below. Systems: constitutional, ears/nose/mouth/throat, respiratory, gastrointestinal, genitourinary, musculoskeletal, integumentary, neurologic, psychiatric, endocrine. Positive findings noted below. Modified ESAS Completed by: provider   Fatigue: 3 Drowsiness: 1   Depression: 0 Pain: 5   Anxiety: 0 Nausea: 4   Anorexia: 5 Dyspnea: 2     Constipation: No     Stool Occurrence(s): 1        PHYSICAL EXAM:     From RN flowsheet:  Wt Readings from Last 3 Encounters:   02/22/18 117 lb 15.1 oz (53.5 kg)   01/31/18 113 lb (51.3 kg)   01/08/18 113 lb (51.3 kg)     Blood pressure 117/75, pulse 99, temperature 97.7 °F (36.5 °C), resp. rate 20, height 5' 6\" (1.676 m), weight 117 lb 15.1 oz (53.5 kg), SpO2 100 %, not currently breastfeeding.     Pain Scale 1: Numeric (0 - 10)  Pain Intensity 1: 0  Pain Onset 1: continuous  Pain Location 1: Flank, Back  Pain Orientation 1: Lateral, Posterior  Pain Description 1: Sharp  Pain Intervention(s) 1: Medication (see MAR) (PCA dilaudid)     Constitutional: fatigued, lethargic   Eyes: pupils equal, anicteric  ENMT: no nasal discharge, dry mucous membranes  Cardiovascular: regular rhythm  Respiratory: breathing not labored at rest, +exp wheezing   Gastrointestinal: soft , min TTP,very hypoactive bowel sounds  Musculoskeletal: no deformity, no tenderness to palpation  Skin: warm, dry  Neurologic: lethargic   Psychiatric: lethargic      HISTORY:     Principal Problem:    SOB (shortness of breath) (8/2/2017)      Past Medical History:   Diagnosis Date    Cancer ([facility] [address]     Lung CA    Chronic obstructive pulmonary disease (HCC)     Emphysema     Heart failure (HCC)       Past Surgical History:   Procedure Laterality Date    CHEST SURGERY PROCEDURE UNLISTED Bilateral 2007/2009    lung lobe resection    HX CHOLECYSTECTOMY      HX GYN      GEETA    HX ORTHOPAEDIC      shoulder    HX OTHER SURGICAL      pacemaker/defib    INS PPM/ICD LED SING ONLY  11/4/2016           History reviewed. No pertinent family history. History reviewed, no pertinent family history.   Social History   Substance Use Topics    Smoking status: Former Smoker     Packs/day: 1.00     Years: 40.00     Quit date: 2/6/2017    Smokeless tobacco: Former User     Quit date: 10/26/2016    Alcohol use No     No Known Allergies   Current Facility-Administered Medications   Medication Dose Route Frequency    ondansetron (ZOFRAN) injection 4 mg  4 mg IntraVENous Q4H PRN    albuterol (PROVENTIL VENTOLIN) nebulizer solution 2.5 mg  2.5 mg Nebulization Q2H PRN    prochlorperazine (COMPAZINE) with saline injection 10 mg  10 mg IntraVENous Q4H PRN    HYDROmorphone (PF) (DILAUDID) injection 1 mg  1 mg IntraVENous Q3H PRN    HYDROmorphone (PF) 15 mg/30 ml (DILAUDID) PCA   IntraVENous CONTINUOUS    oxyCODONE IR (ROXICODONE) tablet 5 mg  5 mg Oral Q4H PRN    sodium chloride (NS) flush 5-10 mL  5-10 mL IntraVENous PRN    glucose chewable tablet 16 g  4 Tab Oral PRN    dextrose (D50W) injection syrg 12.5-25 g  12.5-25 g IntraVENous PRN    glucagon (GLUCAGEN) injection 1 mg  1 mg IntraMUSCular PRN    budesonide (PULMICORT) 500 mcg/2 ml nebulizer suspension  500 mcg Nebulization BID RT          LAB AND IMAGING FINDINGS:     Lab Results   Component Value Date/Time    WBC 23.4 (H) 02/22/2018 03:16 AM    HGB 11.9 02/22/2018 03:16 AM    PLATELET 474 (L) 53/83/7273 03:16 AM     Lab Results   Component Value Date/Time    Sodium 124 (L) 02/22/2018 03:16 AM    Potassium 6.7 (HH) 02/22/2018 03:16 AM    Chloride 90 (L) 02/22/2018 03:16 AM    CO2 20 (L) 02/22/2018 03:16 AM    BUN 65 (H) 02/22/2018 03:16 AM    Creatinine 3.12 (H) 02/22/2018 03:16 AM    Calcium 8.8 02/22/2018 03:16 AM    Magnesium 2.6 (H) 02/22/2018 03:16 AM    Phosphorus 6.5 (H) 02/21/2018 03:59 AM      Lab Results   Component Value Date/Time    AST (SGOT) 458 (H) 02/21/2018 03:59 AM    Alk. phosphatase 242 (H) 02/21/2018 03:59 AM    Protein, total 6.1 (L) 02/21/2018 03:59 AM    Albumin 3.0 (L) 02/21/2018 03:59 AM    Globulin 3.1 02/21/2018 03:59 AM     Lab Results   Component Value Date/Time    INR 1.0 02/16/2018 04:28 AM    Prothrombin time 10.4 02/16/2018 04:28 AM    aPTT 26.3 02/16/2018 04:28 AM      No results found for: IRON, FE, TIBC, IBCT, PSAT, FERR   No results found for: PH, PCO2, PO2  No components found for: [place]   Lab Results   Component Value Date/Time     02/16/2018 03:33 AM    CK - MB 2.4 02/16/2018 03:33 AM                Total time:   Counseling / coordination time, spent as noted above:  > 50% counseling / coordination?:     Prolonged service was provided for  []30 min   []75 min in face to face time in the presence of the patient, spent as noted above. Time Start:   Time End:   Note: this can only be billed with 76647 (initial) or 89145 (follow up). If multiple start / stop times, list each separately.

## 2018-02-22 NOTE — PROGRESS NOTES
[address] with Joi Melchor in radiology in reference to Abd KUB order. She stated that the test would be done \"in the next couple of hours. It should be done by 1700. \". Informed RN, and discussed that it would be most appropriate for the pt to have to test bedside. 1545- Unable to modify order, as it was in process. Instruction attached stated to call radiology to modify order. Spoke with Missy Salgado, in radiology and informed of the need for the KUB to be changed to bedside. He stated, \"Ok\". RN informed. 1940-KUB still not performed, new order placed, as it is still unable to be modified.

## 2018-02-22 NOTE — PROGRESS NOTES
7:45 - Shift change report received from [address] [place] [address] RN   8:00  8:20 Tom met with patient and family, patient and family expresses they would like to transition to comfort care, patient and family expresses patient would like to be a DNR  8:35 Called Shawn Chu

## 2018-02-22 NOTE — PROGRESS NOTES
Problem: Chronic Obstructive Pulmonary Disease (COPD)  Goal: *Oxygen saturation during activity within specified parameters  Outcome: Progressing Towards Goal  Assess oxygen saturation, breath sounds, respiratory rate  Administer oxygen as needed/ordered  Maintain oxygen saturation within specified parameters  Goal: *Optimize nutritional status  Outcome: Not Progressing Towards Goal  Encourage patient to eat meals  Document percentage of meals    Problem: Falls - Risk of  Goal: *Absence of Falls  Document Fay Fall Risk and appropriate interventions in the flowsheet.    Fall Risk Interventions:  Mobility Interventions: Communicate number of staff needed for ambulation/transfer, Patient to call before getting OOB         Medication Interventions: Evaluate medications/consider consulting pharmacy, Patient to call before getting OOB, Teach patient to arise slowly    Elimination Interventions: Call light in reach, Patient to call for help with toileting needs, Toilet paper/wipes in reach

## 2018-02-22 NOTE — PROGRESS NOTES
Spiritual Care Partner Volunteer visited patient in Rm 411 on 2/22/18. Documented by:   Chaplain Payne MDiv, MACE  [phone] ([phone])

## 2018-02-22 NOTE — HOSPICE
[facility]  [phone]     Patient Name: Ruslan Styles  YOB: 1950  Age: 74 y.o. [facility] RN Note:  Hospice consult received, reviewing chart. Will follow up with Unit Nurse and Care Manager to discuss plan of care, patient status and discharge disposition within the hour. Thank you for the opportunity to be of service to this patient.     Trevor Escobar RN [facility]

## 2018-02-22 NOTE — PROGRESS NOTES
Hospitalist Progress Note        Date of Service:  2018  NAME:  Kary Mehta  :  1950  MRN:  [id number]      Admission Summary:   The patient is a 72-year-old female with past medical history of metastatic lung cancer, congestive heart failure, COPD on chronic oxygen at home, history of multiple thyroid nodules, tobacco use disorder, status post ICD placement, nonischemic cardiomyopathy who presents to the hospital with the above mentioned symptoms. Patient reports that about two days back, she had some blood work done and was found to be hyponatremic going in for IV infusion and was fine after that. Patient reports that yesterday she started having some wheezing associated with shortness of breath, cough and dyspnea on exertion. The symptoms progressively got worse. She had to increase her home oxygen at 3 liters, but that did not seem to help and she decided to come to the hospital.  The patient reports that the cough is somewhat productive of yellowish colored sputum. She also reports that she had some chills, but denies any fevers associated with her symptoms. Patient reports that she has history of lung cancer, had right lobectomy in  and left lobectomy in , currently on chemotherapy. Patient also reports that she was told that there is \"new cancer\" in her liver. I am not sure whether this was metastases versus primary liver cancer, though she reports that \"it is being the same chemotherapy. \"  Patient denies any other concerns or problems.   Denies any headache, blurry vision, sore throat, trouble swallowing, trouble with speech, any chest pain, abdominal pain, constipation, diarrhea, urinary symptoms, focal or generalized neurological weakness, recent travel, sick contacts, falls, injuries, hematemesis, melena, hemoptysis.     Interval history / Subjective:     Renal failure continued to worsen and the patient has become less responsive. She still reports pain is uncontrolled. Patient and family have decided to transition to comfort measures, see my note from earlier today. Assessment & Plan:     Comfort measures initiated. Shortness of breath, most likely secondary to chronic obstructive pulmonary disease exacerbation with underlying metastatic pulmonary malignancy      Abdominal Pain: RUQ pain worsened overnight,CT abd showed complex ascites as well as a pelvic stone with no site of perforation identified, as well as interval progression of hepatic mets    OLYA: likely KHANH and pre-renal.    Hyponatremia, recurrent, probably secondary to malignancy    Hyperkalemia    Elevated liver enzymes/Transaminitis    Lung cancer    History of systolic congestive heart failure    Underweight    DNR. See ACP note from earlier today. Face to face time 15 minutes. Care Plan discussed with: Patient/Family and Nurse  Disposition: consult hospice     Hospital Problems  Date Reviewed: 2/16/2018          Codes Class Noted POA    * (Principal)SOB (shortness of breath) ICD-10-CM: R06.02  ICD-9-CM: 786.05  8/2/2017 Unknown              Review of Systems:   Pertinent items are noted in HPI. Vital Signs:    Last 24hrs VS reviewed since prior progress note.  Most recent are:  Visit Vitals    /75 (BP 1 Location: Right arm, BP Patient Position: At rest)    Pulse 99    Temp 97.7 °F (36.5 °C)    Resp 20    Ht 5' 6\" (1.676 m)    Wt 53.5 kg (117 lb 15.1 oz)    SpO2 100%    Breastfeeding No    BMI 19.04 kg/m2         Intake/Output Summary (Last 24 hours) at 02/22/18 1205  Last data filed at 02/22/18 6175   Gross per 24 hour   Intake          1463.33 ml   Output                0 ml   Net          1463.33 ml        Physical Examination:             Constitutional:  NAD, chronically-ill appearing, lethargic   ENT:  anicteric sclerae   Neuro: lethargic  Psych: calm , does awaken briefly to voice                           Data Review: Review and/or order of clinical lab test  Review and/or order of tests in the radiology section of [facility]      Labs:     Recent Labs      02/22/18 0316 02/21/18   0359   WBC  23.4*  24.0*   HGB  11.9  13.1   HCT  35.8  38.5   PLT  105*  122*     Recent Labs      02/22/18 0316 02/21/18 2008 02/21/18 1413 02/21/18 0359 02/20/18   0344   NA  124*   --   123*  124*  124*   K  6.7*  6.3*  6.7*  6.1*  4.9   CL  90*   --   89*  86*  88*   CO2  20*   --   22  25  23   BUN  65*   --   55*  46*  23*   CREA  3.12*   --   2.40*  1.83*  0.98   GLU  138*   --   122*  84  69   CA  8.8   --   9.0  9.6  9.2   MG  2.6*   --    --   2.4  1.7   PHOS   --    --    --   6.5*   --      Recent Labs      02/21/18 0359 02/20/18   0344   SGOT  458*  290*   ALT  382*  260*   AP  242*  223*   TBILI  1.0  0.8   TP  6.1*  6.3*   ALB  3.0*  3.0*   GLOB  3.1  3.3       Lab Results   Component Value Date/Time    Cholesterol, total 178 10/27/2016 03:00 AM    HDL Cholesterol 61 10/27/2016 03:00 AM    LDL, calculated 99.8 10/27/2016 03:00 AM    Triglyceride 86 10/27/2016 03:00 AM    CHOL/HDL Ratio 2.9 10/27/2016 03:00 AM     Lab Results   Component Value Date/Time    Glucose (POC) 182 (H) 02/21/2018 09:21 PM    Glucose (POC) 146 (H) 02/21/2018 05:05 PM    Glucose (POC) 135 (H) 02/21/2018 11:53 AM    Glucose (POC) 101 (H) 02/21/2018 06:47 AM    Glucose (POC) 115 (H) 02/20/2018 09:19 PM     Lab Results   Component Value Date/Time    Color YELLOW/STRAW 02/17/2018 12:31 PM    Appearance CLEAR 02/17/2018 12:31 PM    Specific gravity 1.029 02/17/2018 12:31 PM    pH (UA) 6.0 02/17/2018 12:31 PM    Protein TRACE (A) 02/17/2018 12:31 PM    Glucose NEGATIVE  02/17/2018 12:31 PM    Ketone NEGATIVE  02/17/2018 12:31 PM    Bilirubin NEGATIVE  08/03/2017 05:59 AM    Urobilinogen 0.2 02/17/2018 12:31 PM    Nitrites NEGATIVE  02/17/2018 12:31 PM    Leukocyte Esterase NEGATIVE  02/17/2018 12:31 PM    Epithelial cells FEW 02/17/2018 12:31 PM Bacteria NEGATIVE  02/17/2018 12:31 PM    WBC 0-4 02/17/2018 12:31 PM    RBC 5-10 02/17/2018 12:31 PM       Medications Reviewed:     Current Facility-Administered Medications   Medication Dose Route Frequency    ondansetron (ZOFRAN) injection 4 mg  4 mg IntraVENous Q4H PRN    albuterol (PROVENTIL VENTOLIN) nebulizer solution 2.5 mg  2.5 mg Nebulization Q2H PRN    prochlorperazine (COMPAZINE) with saline injection 10 mg  10 mg IntraVENous Q4H PRN    LORazepam (ATIVAN) injection 1 mg  1 mg IntraVENous Q15MIN PRN    haloperidol (HALDOL) 2 mg/mL oral solution 2 mg  2 mg SubLINGual Q6H PRN    Or    haloperidol lactate (HALDOL) injection 2 mg  2 mg IntraVENous Q6H PRN    glycopyrrolate (ROBINUL) injection 0.2 mg  0.2 mg IntraVENous Q4H PRN    dexamethasone (DECADRON) 4 mg/mL injection 2 mg  2 mg IntraVENous Q6H PRN    dexamethasone (DECADRON) 4 mg/mL injection 4 mg  4 mg IntraVENous Q12H    budesonide (PULMICORT) 500 mcg/2 ml nebulizer suspension  500 mcg Nebulization BID PRN    HYDROmorphone (PF) (DILAUDID) injection 1 mg  1 mg IntraVENous Q3H PRN    HYDROmorphone (PF) 15 mg/30 ml (DILAUDID) PCA   IntraVENous CONTINUOUS    oxyCODONE IR (ROXICODONE) tablet 5 mg  5 mg Oral Q4H PRN    sodium chloride (NS) flush 5-10 mL  5-10 mL IntraVENous PRN    glucose chewable tablet 16 g  4 Tab Oral PRN     ______________________________________________________________________  EXPECTED LENGTH OF STAY: 3d 2h  ACTUAL LENGTH OF STAY:          3                 Kevin Harry MD

## 2018-02-22 NOTE — PROGRESS NOTES
Notified by nurse that the pt and family wish to transition to comfort care. I spoke to the patient who was drowsy from the pain meds and she acknowledged that she wants to be comfortable only. Her family (, daughter, sister, and others) were all at the bedside and agreed with this. I explained to them we will stop lab checks and anything else that doesn't contribute to her comfort. Will place comfort orders and consult hospice. I anticipate she would qualify for inpatient hospice due to renal failure. Discussed the case with her primary oncologist at [facility].

## 2018-02-22 NOTE — PALLIATIVE CARE
Palliative Medicine Social Work    Dr. Etienne Overall and I met with patient; , Sarah Pulido; daughter, Pilo Chen; and son, Roz Rice. Patient resting comfortably during our visit. A decision was made last evening for shift to comfort measures only. Family still reports her pain/symptoms seem to build with PCA. They are handing her the button to push when it seems symptoms are escalating. Discussed increasing basal dose. Clarified and addressed what shift to comfort would entail; stopping labs, diagnostics, fluids; explaining why these interventions would not speak to comfort. Prepared that hospice would come to see them today and be a good support. Patient seems to meet criteria for [facility], given her symptoms and rising creatinine. Patient also with possible obstruction with no desired work-up.  informed and Comfort Cart requested. Grief process appears normal.      Thank you for the opportunity to be involved in the care of Ms. Girard and her family. Jahaira Parks, JODY, [facility] Hospital[facility]-SW  Palliative Medicine   Respecting Choices ® ACP Facilitator   [phone]

## 2018-02-22 NOTE — HOSPICE
[facility]  [phone]    Inpatient Nursing Admission   Patient Name: Estefani Sanchez  YOB: 1950  Age: 74 y.o. Date of Hospice Admission: 2/22/2018  Hospice Attending Elected by Patient: Dino Coombs MD  Primary Care Physician: Andrew Pagan MD  Admitting RN: Rodri Lopez RN  : MADHAVI Lake     Level of Care (GIP/Routine/Respite): Routine  Facility of Care: [facility]  Patient Room: [facility]/     HOSPICE SUMMARY   ER Visits/ Hospitalizations in past year:   Hospice Diagnosis: lung ca  Metastatic primary lung cancer [facility])  Onset Date of Hospice Diagnosis: 2/16/18  Summary of Disease Progression Leading to Hospice Diagnosis:   Tomasa Dial is a 74 y.o. Female who came to [facility]/ED on 2/16 for SOB, cough x 2 days. She has a PMHx: metastatic lung cancer, bilateral lobectomy, chemo/radiation, heart failure, NICM s/p AICD, COPD, O2 2L. EMS gave a duo-neb treatment on way to ED. She states had recently received IVF at oncologist office for low sodium. She was admitted to medical telemetry bed. She developed severe RUQ abdominal pain and required PCA Dilaudid. Palliative was consulted for care decisions given her poor prognosis. The family decided on comfort care with the support of hospice. Dr. Lenora Salcedo contacted for verbal CTI. Co-Morbidities:   Patient Active Problem List   Diagnosis Code    Hypoxia R09.02    Multiple thyroid nodules E04.2    Tobacco use disorder Z60.787    Systolic heart failure (HCC) I50.20    S/P ICD (internal cardiac defibrillator) procedure Z95.810    Heart failure, left, with LVEF <=30% (HCC) I50.1    H/O: lung cancer Z85. 80    NICM (nonischemic cardiomyopathy) (HCC) I42.8    Panlobular emphysema (HCC) J43.1    Acute bronchitis J20.9    Dehydration E86.0    SOB (shortness of breath) R06.02    Metastatic primary lung cancer (HCC) C34.90     Diagnoses RELATED to the terminal prognosis:  COPD, RUQ pain, hyponatremia  Other Diagnoses: NICM s/p AICD, malnutrition    Rationale for a prognosis of life expectancy of 6 months or less if the disease follows its normal course (Disease Specific History):     Bipin Escamilla is a 74 y.o. who was admitted to [facility]. The patient's principle diagnosis of Lung CA with mets has resulted in abdominal pain, SOB, weakness, debility, anorexia. Functionally, the patient's Palliative Performance Scale has declined over a period of several days and is estimated at 10%. Objective information that support this patients limited prognosis includes:    CT Abd/Pelvis 2/19/18: IMPRESSION:  Mildly complex ascites in the pelvis and surrounding the liver (image 73). This  contains a stone but site of perforation is not identified and the fluid is not  as dense as expected. Clinical correlation and follow-up recommended  Interval progression of the hepatic metastatic disease  Emphysema    The patient/family chose comfort measures with the support of Hospice. Patient meets for routine LOC as evidenced by nonverbal pain, minimal responsiveness, diminished lung sounds, weakness, debility. Prognosis estimated based on 02/22/18 clinical assessment is:   [x] Hours to Days       ASSESSMENT    Patient self-reports:  [x]  No    SYMPTOMS: nonverbal pain, coarse lungs sounds, minimal responsiveness, weakness, debility. SIGNS/PHYSICAL FINDINGS:  Ill-appearing cachectic middle age female who is minimally responsive to tactile stimuli, mouth breathing unlabored and shallow on 3L O2 NC, lungs have scattered rhonchi with diminished bases, RR-8-10, HRR, P-IV bilateral AC, abd soft, distended. Kuo draining, bowel sounds hypo to absent, skin pale, dry, abdominal bruise, sacrum stg 1, heels boggy, pedal pulses +    KARNOFSKY: 10%    FAST for all dementia:      Learning Assessment:  Patient  Is patient willing/able to learn?n/a  What is the highest level of education completed?   Learning preference (written material, demonstration, visual)? Learning barriers (ESOL, [place], poor vision)? Caregiver  Is caregiver willing to learn care for patient? What is the highest level of education completed? Learning preference (written material, demonstration, visual)? Learning barriers (ESOL, [place], poor vision)? CLINICAL INFORMATION     Wt Readings from Last 3 Encounters:   02/22/18 53.5 kg (117 lb 15.1 oz)   01/31/18 51.3 kg (113 lb)   01/08/18 51.3 kg (113 lb)     Ht Readings from Last 3 Encounters:   02/16/18 5' 6\" (1.676 m)   01/31/18 5' 6\" (1.676 m)   01/08/18 5' 6\" (1.676 m)       Body mass index is 19.04 kg/(m^2). Visit Vitals    /75 (BP 1 Location: Right arm, BP Patient Position: At rest)    Pulse 100    Temp 97.7 °F (36.5 °C)    Resp (!) 7    Ht 5' 6\" (1.676 m)    Wt 53.5 kg (117 lb 15.1 oz)    SpO2 (P) 94%    Breastfeeding No    BMI 19.04 kg/m2     LAB VALUES  Results for Kourtney Bautista (MRN [id number]) as of 2/22/2018 18:35   Ref.  Range 2/22/2018 03:16   WBC Latest Ref Range: 3.6 - 11.0 K/uL 23.4 (H)   NRBC Latest Ref Range: 0  WBC 0.0   RBC Latest Ref Range: 3.80 - 5.20 M/uL 3.72 (L)   HGB Latest Ref Range: 11.5 - 16.0 g/dL 11.9   HCT Latest Ref Range: 35.0 - 47.0 % 35.8   MCV Latest Ref Range: 80.0 - 99.0 FL 96.2   MCH Latest Ref Range: 26.0 - 34.0 PG 32.0   MCHC Latest Ref Range: 30.0 - 36.5 g/dL 33.2   RDW Latest Ref Range: 11.5 - 14.5 % 13.7   PLATELET Latest Ref Range: 150 - 400 K/uL 105 (L)   MPV Latest Ref Range: 8.9 - 12.9 FL 11.7   ABSOLUTE NRBC Latest Ref Range: 0.00 - 0.01 K/uL 0.00   Sodium Latest Ref Range: 136 - 145 mmol/L 124 (L)   Potassium Latest Ref Range: 3.5 - 5.1 mmol/L 6.7 (HH)   Chloride Latest Ref Range: 97 - 108 mmol/L 90 (L)   CO2 Latest Ref Range: 21 - 32 mmol/L 20 (L)   Anion gap Latest Ref Range: 5 - 15 mmol/L 14   Glucose Latest Ref Range: 65 - 100 mg/dL 138 (H)   BUN Latest Ref Range: 6 - 20 MG/DL 65 (H)   Creatinine Latest Ref Range: 0.55 - 1.02 MG/DL 3.12 (H)   BUN/Creatinine ratio Latest Ref Range: 12 - 20   21 (H)   Calcium Latest Ref Range: 8.5 - 10.1 MG/DL 8.8   Magnesium Latest Ref Range: 1.6 - 2.4 mg/dL 2.6 (H)   GFR est non-AA Latest Ref Range: >60 ml/min/1.73m2 15 (L)   GFR est AA Latest Ref Range: >60 ml/min/1.73m2 18 (L)   Lactic acid Latest Ref Range: 0.4 - 2.0 MMOL/L 4.2 (HH)     Lab Results   Component Value Date/Time    Protein, total 6.1 (L) 02/21/2018 03:59 AM    Albumin 3.0 (L) 02/21/2018 03:59 AM       Currently this patient has:  [x] Supplemental O2  [x] Peripheral IV   [x] Kuo Catheter  [x] AICD: Has ICD been deactivated? [x] No:  [facility] called, waiting for their visit    PLAN     1. Admit to Routine care for end of life. 2. Severe pain. Increase Dilaudid PCA to 0.4mg basal continuous with PRN Dilaudid IV push, PRN Ativan  3. Coarse airway secretions. Schedule Scopolamine patch, Robinul PRN  4. Deactivate AICD  5. Urinary retention. Place Kuo catheter. 6. Support and educate family. 7.  and MSW visits. 8. Transfer to home when symptoms managed. Hospice Team Frequency Orders:  Skilled Nurse -  Every other day x 7 days with 5 PRN visits for symptom control. MSW  1 visit for initial assessment/evaluation for family support and need for volunteer services. Milan Estrada  1 visit for initial assessment/evaluation for spiritual support.      ADVANCE CARE PLANNING (Complete in ACP Flow Sheet)   Code Status: DNR  Durable DNR: [x]  Yes  Code Status Discussed/Confirmed: yes  Preference for Other Life Sustaining Treatment Discussed/Confirmed: yes  Hospitalization Preference:  [address] Planning 2/16/2018   Patient's Healthcare Decision Maker is: Legal Next of Whitney 71   Primary Decision Maker Name Zayra Estevez   Primary Decision Maker Phone Number [phone], [phone]   Primary Decision Maker Relationship to Patient Spouse   Secondary Decision Maker Name -   Secondary Decision [address] [place] [address] Phone Number -   Secondary Decision Maker Relationship to Patient -   Confirm Advance Directive None   Patient Would Like to Complete Advance Directive No       Zoroastrian: Restoration   Home: Kiet Santillan      1. Discharge Plan: Likely to  inpatient  2. Patient/Family teaching: Signs of end of life, medications to manage  3. Response to patient/family teaching: Accepting and good understanding    SOCIAL/EMOTIONAL/SPIRITUAL NEEDS     Spiritual Issues Identified: Faith alexis, comfort from belief    Psych/ Social/ Emotional Issues Identified: Pt  to Ashutosh Mcleod, two adult children Patricia and Tri Townsend, 4 grandchildren, 1 great-grandchild. 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 She enjoyed shopping per her family. Caregiver Support:  [x] Provided information on End of Life Care   [x] Material Provided: Gone From My Sight or Angeli Aus   Dr. Aniyah Mccain contacted, discharge to hospice order received  Dr. Ervin Castleman contacted, agrees to serve as attending provider for hospice and provided verbal certification of terminal illness with life expectancy of 6 months or less. Orders for hospice admission, medications and plan of treatment received. Medication reconciliation completed.   MEDS: See medication list below  DME: Per hospital  Supplies: Per hospital  IDT communication to include MD, SN, SW, CH and support team    ALLERGIES AND MEDICATIONS     Allergies: No Known Allergies  Current Facility-Administered Medications   Medication Dose Route Frequency    LORazepam (ATIVAN) injection 1 mg  1 mg IntraVENous Q15MIN PRN    ketorolac (TORADOL) injection 30 mg  30 mg IntraVENous Q8H PRN    glycopyrrolate (ROBINUL) injection 0.2 mg  0.2 mg IntraVENous Q4H PRN    scopolamine (TRANSDERM-SCOP) 1 mg over 3 days 1 Patch  1 Patch TransDERmal Q72H PRN    bisacodyl (DULCOLAX) suppository 10 mg  10 mg Rectal DAILY PRN    HYDROmorphone (PF) (DILAUDID) injection 0.5 mg  0.5 mg IntraVENous Q15MIN PRN    acetaminophen (TYLENOL) suppository 650 mg  650 mg Rectal Q4H PRN    HYDROmorphone (PF) 15 mg/30 ml (DILAUDID) PCA   IntraVENous CONTINUOUS    saline peripheral flush soln 5 mL  5 mL InterCATHeter PRN

## 2018-02-22 NOTE — PROGRESS NOTES
Chart reviewed and patient's status discussed in rounds. Consult for Hospice noted and sent referral to [facility] for hospice.     Yehuda Hua RN CRM  Ext [phone]

## 2018-02-22 NOTE — HOSPICE
[facility]  [phone]    Patient Name: Enrique Wyatt  YOB: 1950  Age: 74 y.o. [address] [facility] RN Note:  Hospice consult noted. Chart reviewed. Plan of care discussed with patients nurse & care manager. Dylan Rubi NP and this liaison were in to assess pt and meet with family at bedside. Pt's  Oscar Tadeo was not present and family said he will return shortly. Discussed Hospice philosophy, general plan of care, and services. Family wishes to have inpatient hospice. Pt is minimally responsive to physical stimuli, appears comfortable on Dilaudid PCA. 0130:   Oscar Tadeo came to room. He is in agreement with inpatient hospice care. They are requesting Dr. Natan Castro be attending. Call made to her office but they will return call later. Legal consents signed. Dr. Patti Cook contacted for verbal CTI. Family information packet provided and reviewed. Thank you for the opportunity to be of service to this patient.     Tamra Olson RN [facility]

## 2018-02-22 NOTE — PROGRESS NOTES
Problem: Patient Education: Go to Patient Education Activity  Goal: Patient/Family Education  Outcome: Progressing Towards Goal  Teach patient about PCA pump. Seen pt demonstrated back     0345- patient has not voided during shift. Bladder scan showed 77 ml. Called and Spoke with Dr. Daniel Sarmiento received order for [facility], but patient refused at this time.  and family at bedside. Critical Result Notification    Received and verbally repeated the following test results lactic 4.2, potassium 6.9  from [facility] (NAME OF TECHNICIAN) on 02/22/18 (DATE), at [id number] (TIME). Dr. Daniel Sarmiento (NAME OF PROVIDER) was notified and provided a verbal readback of the results listed above on 02/22/18(DATE) at 2820(TIME). Orders were received at this time. Additional comments: notified MD that pt refused all medications and procedures at this time including previous CRX he ordered. Cecy Liao RN    0066- Family (, Son, Daughter, and some extended family) at bedside. Pt's  mentioned that they're leaning towards comfort care, but no official order has been made yet. Dr. Daniel Sarmiento is aware and suggested that attending MD should make changes of this regard in the morning since he knows the patient and family well. Family also stated they do not want any medications or treatment done at this time. Bedside and Verbal shift change report given to Emre Irby RN(oncoming nurse) by Cecy Liao RN (offgoing nurse). Report included the following information SBAR, Kardex, MAR and Recent Results.

## 2018-02-23 ENCOUNTER — HOME CARE VISIT (OUTPATIENT)
Dept: HOSPICE | Facility: HOSPICE | Age: 68
End: 2018-02-23
Payer: MEDICARE

## 2018-02-23 PROCEDURE — 0651 HSPC ROUTINE HOME CARE

## 2018-02-23 NOTE — PROGRESS NOTES
Bedside shift change report given to [address] [place] [address] (oncoming nurse) by Margaret Alarcon (offgoing nurse). Report included the following information SBAR, Kardex, Intake/Output, MAR and Recent Results.

## 2018-02-23 NOTE — HOSPICE
[facility]  [phone]    Discharge/Death Nursing Note   Patient Name: Triston Sibley  YOB: 1950  Age: 74 y.o. Date of Death: 18  Admitted Date: 2018  Time of Death: [address]  of Care: [facility]  Level of Care: Routine  Patient Room: [facility]     Hospice Attending: Dr. Javier Lances Diagnosis: lung ca  Metastatic primary lung cancer [facility])    Death Pronouncement   Pronouncement of death completed by: Isis Bush MD    Agency staff was not present at the time of death    At the time of death the patient was documented as not breathing, apneic    The pt  within [facility]    The following were notified of the patient's death: Family at bedside, [address] [place] [address], MD, [facility]    Medications were disposed of per facility protocol     Discharge Summary   Discharge Reason: Death    Summary of Care Provided:    [x] Post mortem care provided by nursing staff  [x] Notification of  home by Charge nurse  [x] Referrals/Community resources provided: bereavement info  [x] Goals completed  [] Durable Medical Equipment vendor notified     Disciplines involved: [x] RN [] SW [x]  [] DOMINGUEZ [] Vol [] PT [] OT [] ST [] [facility]    [x] IDT communication/notification    Attending Physician, Dr. Teodora Kelsey, notified of death    Bereaved   Verify bereaved identified with name, address, telephone number and risk level      Advance Care Planning 2018   Patient's Healthcare Decision Maker is: Legal Next of Whitney 71   Primary Decision Maker Name Dogu Lemus   Primary Decision Maker Phone Number [phone]   Primary Decision Maker Relationship to Patient Spouse   Secondary Decision Maker Name -   Secondary Decision Maker Phone Number -   Secondary Decision Maker Relationship to Patient -   Confirm Advance Directive None   Patient Would Like to Complete Advance Directive No     De La Cruz's FH to serve. Bereavement low.

## 2018-02-23 NOTE — PROGRESS NOTES
Primary Nurse Hi Constantino RN and Lennox Grippe, RN performed a dual skin assessment on this patient Impairment noted- see wound doc flow sheet    Patient's heels are red and blanchable; heels elevated with pillow  Patient has scattered bruising; also has a long bruise going from midline of abdomen to the left side on abdomen  Irregular mole noted on LLQ   Patient's sacrum is red and non-blanchable; patient turned on left side with pillow. Eric score is 11    Upon placing pillow underneath patient, family member returned to room and removed pillow. Education on the importance on relieving pressure on the sacrum provided; family member verbalized understanding and pillow was placed underneath the patient.

## 2018-02-23 NOTE — PROGRESS NOTES
Responded to notification of pt death in 12; visited with a large family gathered at bedside and right outside the room; provided condolences to the family and prayer in honor of the ; family did not have any questions for me at this time. Rev.  Scott Hendrickson, Ph.D., M.Div., M.A.,   /Director of [address] [facility]  Paging Service: [phone])

## 2018-02-23 NOTE — H&P
[facility] Help to Those in Need  [phone]    Patient Name: Sahil Crisostomo  YOB: 1950    Date of Provider Hospice Visit: 2/22/18  Level of Care:   [] General Inpatient (GIP)    [] Routine   [] Respite    Location of Care:  [x] [facility] [] [facility] [] [facility] [] [facility] [] [facility]    Date of Original Hospice Admission: 2/22/18  Hospice Medical Director at time of admission:Dr Hankins [place] [id number] Diagnosis: metastatic lung cancer  Diagnoses RELATED to the terminal prognosis: congestive heart failure, COPD  NICM s/p AICD, malnutrition,   hyponatremia     HOSPICE SUMMARY      Sahil Crisostomo is a 74 y.o. who was admitted to [facility]. The patient's principle diagnosis of Lung CA with mets has resulted in abdominal pain, SOB, weakness, debility, anorexia. Functionally, the patient's Palliative Performance Scale has declined over a period of several days and is estimated at 10%. Objective information that support this patients limited prognosis includes:     CT Abd/Pelvis 2/19/18: IMPRESSION:  Mildly complex ascites in the pelvis and surrounding the liver (image 73). This  contains a stone but site of perforation is not identified and the fluid is not  as dense as expected. Clinical correlation and follow-up recommended  Interval progression of the hepatic metastatic disease  Emphysema     The patient/family chose comfort measures with the support of Hospice.     Patient meets for routine LOC as evidenced by nonverbal pain, minimal responsiveness, diminished lung sounds, weakness, debility. Functionally, the patient's Karnofsky and/or Palliative Performance Scale has declined over a period of days  and is estimated at 10%. The patient is dependent on the following ADLs:         The patient/family chose comfort measures with the support of Hospice. HOSPICE DIAGNOSES   Active Symptoms:  1. Altered mental status  2. Shortness of breath  3.  Weakness and fatigue  4. Airway secretions  5. Non verbal pain indicators     PLAN   1. Admit to routine level of care at [facility]  2. Deactivate AICD  3. Kuo catheter due to urinary retention  4. Dilaudid for Non verbal pain indicators    5.  and SW to support family needs  6. Disposition: pt is moribund and will remain here    Prognosis estimated based on 02/23/18 clinical assessment is:   [x] Hours     [] Days to Weeks    [] Other:    Communicated plan of care with: Hospice Case Manager;  Hospice IDT; Care Team     GOALS OF CARE     Resuscitation Status: DNR  Durable DNR: [x] Yes [] No    Advance Care Planning 2/16/2018   Patient's Healthcare Decision Maker is: Legal Next of [place] 71   Primary Decision Maker Name Joesph Benavidez   Primary Decision Maker Phone Number [phone], [phone]   Primary Decision Maker Relationship to Patient Spouse   Secondary Decision Maker Name -   Secondary Decision [address] Phone Number -   Secondary Decision Maker Relationship to Patient -   Confirm Advance Directive None   Patient Would Like to Complete Advance Directive No        HISTORY     History obtained from: chart,SN, Hospice RN    CHIEF COMPLAINT:    The patient is:   [] Verbal  [x] Nonverbal  [] Unresponsive    HPI/SUBJECTIVE:  74year old female who is actively dying form metastatic lung cancer       REVIEW OF SYSTEMS     The following systems were: [] reviewed  [x] unable to be reviewed    Positive ROS include:  Constitutional: fatigue, weakness, in pain, short of breath  Ears/nose/mouth/throat: increased airway secretions  Respiratory:shortness of breath, wheezing  Gastrointestinal:poor appetite, nausea, vomiting, abdominal pain, constipation, diarrhea  Musculoskeletal:pain, deformities, swelling legs  Neurologic:confusion, hallucinations, weakness  Psychiatric:anxiety, feeling depressed, poor sleep  Endocrine:     Adult Non-Verbal Pain Assessment Score:  5/10  Face  [] 0   No particular expression or smile  [x] 1   Occasional grimace, tearing, frowning, wrinkled forehead  [] 2   Frequent grimace, tearing, frowning, wrinkled forehead    Activity (movement)  [x] 0   Lying quietly, normal position  [] 1   Seeking attention through movement or slow, cautious movement  [] 2   Restless, excessive activity and/or withdrawal reflexes    Guarding  [] 0   Lying quietly, no positioning of hands over areas of body  [] 1   Splinting areas of the body, tense  [x] 2   Rigid, stiff    Physiology (vital signs)  [] 0   Stable vital signs  [x] 1   Change in any of the following: SBP > 20mm Hg; HR > 20/minute  [] 2   Change in any of the following: SBP > 30mm Hg; HR > 25/minute    Respiratory  [] 0   Baseline RR/SpO2, compliant with ventilator  [x] 1   RR > 10 above baseline, or 5% drop SpO2, mild asynchrony with ventilator  [] 2   RR > 20 above baseline, or 10% drop SpO2, asynchrony with ventilator     FUNCTIONAL ASSESSMENT     Palliative Performance Scale (PPS): 10%     PSYCHOSOCIAL/SPIRITUAL ASSESSMENT     Active Problems:    Metastatic primary lung cancer ([facility] (2/22/2018)      Past Medical History:   Diagnosis Date    Cancer ([facility]     Lung CA    Chronic obstructive pulmonary disease (HCC)     Emphysema     Heart failure (HCC)       Past Surgical History:   Procedure Laterality Date    CHEST SURGERY PROCEDURE UNLISTED Bilateral 2007/2009    lung lobe resection    HX CHOLECYSTECTOMY      HX GYN      GEETA    HX ORTHOPAEDIC      shoulder    HX OTHER SURGICAL      pacemaker/defib    INS PPM/ICD LED SING ONLY  11/4/2016           Social History   Substance Use Topics    Smoking status: Former Smoker     Packs/day: 1.00     Years: 40.00     Quit date: 2/6/2017    Smokeless tobacco: Former User     Quit date: 10/26/2016    Alcohol use No     No family history on file. No Known Allergies   No current facility-administered medications for this encounter.       Current Outpatient Prescriptions   Medication Sig    HYDROmorphone, PF, 30 mg/30 ml (DILAUDID) 30 mg/30 mL PCAS 0.4 mg/hr by IntraVENous route continuous. Indications: Severe Pain    HYDROmorphone, PF, (DILAUDID) 1 mg/mL injection 0.5 mg by IntraVENous route every fifteen (15) minutes as needed (severe pain, dyspnea). Indications: severe pain, dyspnea    bisacodyl (DULCOLAX) 10 mg suppository Insert 10 mg into rectum daily as needed (give if no BM in 72 hours). Indications: give if no BM in 72 hours    LORazepam (ATIVAN) 2 mg/mL injection 1 mg by IntraVENous route every fifteen (15) minutes as needed (severe agitation, anxiety). Indications: severe agitation, anxiety    scopolamine (TRANSDERM-SCOP) 1 mg over 3 days pt3d 1 Patch by TransDERmal route every seventy-two (72) hours as needed (excess secretions). Indications: excess secretions    glycopyrrolate (ROBINUL) 0.2 mg/mL injection 0.2 mg by IntraVENous route every four (4) hours as needed (excess secretions). Indications: excess secretions    ketorolac (TORADOL) 30 mg/mL (1 mL) injection 30 mg by IntraVENous route every eight (8) hours as needed (fever, moderate pain). Indications: fever, moderate pain    acetaminophen (TYLENOL) 650 mg suppository Insert 650 mg into rectum every four (4) hours as needed for Fever. Indications: Fever    0.9 % SODIUM CHLORIDE (NORMAL SALINE FLUSH INJECTION) 5 mL by IntraVENous route as needed (line patency). Indications: line patency    OXYGEN-AIR DELIVERY SYSTEMS 3 L/min by Nasal route continuous. Indications: moderate dyspnea    senna (SENNA) 8.6 mg tablet Take 2 Tabs by mouth daily.  omeprazole (PRILOSEC) 20 mg capsule Take 20 mg by mouth daily.  magnesium hydroxide (COPPOLA MILK OF MAGNESIA) 400 mg/5 mL suspension Take 30 mL by mouth daily as needed for Constipation.  furosemide (LASIX) 20 mg tablet Take 20 mg by mouth daily as needed (SOB, Edema as directed by Cardiologist).  lisinopril (PRINIVIL, ZESTRIL) 20 mg tablet Take 1 Tab by mouth daily.     tiotropium (SPIRIVA WITH HANDIHALER) 18 mcg inhalation capsule Take 1 Cap by inhalation every evening.  budesonide-formoterol (SYMBICORT) 160-4.5 mcg/actuation HFAA Take 2 Puffs by inhalation two (2) times a day.  albuterol (PROVENTIL HFA, VENTOLIN HFA, PROAIR HFA) 90 mcg/actuation inhaler Take 2 Puffs by inhalation every six (6) hours as needed for Wheezing.  albuterol (PROVENTIL VENTOLIN) 2.5 mg /3 mL (0.083 %) nebulizer solution 3 mL by Nebulization route every four (4) hours as needed for Wheezing.  spironolactone (ALDACTONE) 25 mg tablet Take 1 Tab by mouth daily.  azithromycin (ZITHROMAX) 250 mg tablet Take 1 Tab by mouth daily.  0.9% sodium chloride inhalation Take 5 mL by inhalation two (2) times a day.  metoprolol succinate (TOPROL-XL) 50 mg XL tablet Take 1 Tab by mouth daily.  ondansetron hcl (ZOFRAN) 8 mg tablet Take 8 mg by mouth every eight (8) hours as needed for Nausea.  prochlorperazine (COMPAZINE) 10 mg tablet Take 10 mg by mouth every six (6) hours as needed for Nausea.  aspirin 81 mg chewable tablet Take 1 Tab by mouth daily. PHYSICAL EXAM     Wt Readings from Last 3 Encounters:   02/22/18 53.5 kg (117 lb 15.1 oz)   01/31/18 51.3 kg (113 lb)   01/08/18 51.3 kg (113 lb)       There were no vitals taken for this visit.     Supplemental O2  [x] Yes  [] NO  Last bowel movement:     Currently this patient has:  [x] Peripheral IV [] PICC  [] PORT [] ICD    [x] Kuo Catheter [] NG Tube   [] PEG Tube    [] Rectal Tube [] Drain  [x] Other: AICD    Constitutional: pt is unresponsive  Eyes: pupils equal, anicteric  ENMT: no nasal discharge, moist mucous membranes  Cardiovascular: regular rhythm, distal pulses intact  Respiratory: breathing not labored, symmetric  Gastrointestinal: soft non-tender, +bowel sounds  Musculoskeletal: no deformity, no tenderness to palpation  Skin: warm, dry  Neurologic:pt is not able to follow commands, pt is not moving all extremities  Psychiatric: calm      Pertinent Lab and or Imaging Tests:  Lab Results   Component Value Date/Time    Sodium 124 (L) 02/22/2018 03:16 AM    Potassium 6.7 (HH) 02/22/2018 03:16 AM    Chloride 90 (L) 02/22/2018 03:16 AM    CO2 20 (L) 02/22/2018 03:16 AM    Anion gap 14 02/22/2018 03:16 AM    Glucose 138 (H) 02/22/2018 03:16 AM    BUN 65 (H) 02/22/2018 03:16 AM    Creatinine 3.12 (H) 02/22/2018 03:16 AM    BUN/Creatinine ratio 21 (H) 02/22/2018 03:16 AM    GFR est AA 18 (L) 02/22/2018 03:16 AM    GFR est non-AA 15 (L) 02/22/2018 03:16 AM    Calcium 8.8 02/22/2018 03:16 AM     Lab Results   Component Value Date/Time    Protein, total 6.1 (L) 02/21/2018 03:59 AM    Albumin 3.0 (L) 02/21/2018 03:59 AM           Total time: [address], NP

## 2018-02-23 NOTE — PROGRESS NOTES
Followed up on the family per day 's request; pt had been moved to [phone] from ; visited with the family at bedside and provided emotional support and informed them of continued availability of spiritual care. Rev. Tamara Berkowitz, Ph.D., M.Div., M.A.,   /Director of [id number]  Paging Service: [phone])

## 2018-02-23 NOTE — PROGRESS NOTES
- Pt passed. Tim Peck RN and Cipriano Guzman RN verified. Lila Nettles, nursing supervisor Esmer Lim, and Hospice paged hospitalist Dr. Toña Prince.     Spoke with Jaimie De Jesus with [facility]. She is not eligible for organ donation due to metastatic CA, but eye bank is following up. Pt has 4 rings on,  [employer] home requested. Pt to be buried whole. Pawel Jim, other nursing supervisor, notified of  home. Wesley Malcolm eye donation cleared.

## 2018-02-23 NOTE — PROGRESS NOTES
I was called to examine patient who . On examination, patient had:  No response to verbal and tactile stimuli. No respiratory effort. Absent heart sounds and pulses. Pupils fixed and dilated. Patient was pronounced dead at 03:19 hours.      Lanre Monroe MD   Hospitalist

## 2018-02-26 NOTE — DISCHARGE SUMMARY
Discharge Summary     Patient: Lm Aburto MRN: [id number]  SSN: [id number]    YOB: 1950  Age: 74 y.o. Sex: female       Admit Date: 2/16/2018    Discharge Date: 2/22/18      Admission Diagnoses:   Shortness of breath    Discharge Diagnoses:   COPD with acute exacerbation  Metastatic lung cancer  Abdominal pain  Acute renal failure  Hyponatremia  Hyperkalemia  Elevated LFTs  Remote history of systolic heart failure     Discharge Condition: Poor    Hospital Course: Ms. Shashi Houston presented with shortness of breath secondary to a COPD exacerbation. She has metastatic lung cancer and was receiving immunotherapy. She complained of severe abdominal pain and work-up revealed progression of liver mets. Unfortunately, she developed acute renal failure, possibly due to contrast-induced nephropathy. Nephrology was consulted and discussed the option of hemodialysis with the patient and her family; they opted not to pursue this. The decision was made to transition to comfort care. She was evaluated by hospice and was admitted under inpatient hospice on 2/22/18. Please see my progress note from the day of discharge. Consults: oncology, nephrology, palliative care    Significant Diagnostic Studies: see above    Disposition: inpatient hospice    See progress note from the same day of discharge.     Signed By: Felix Aaron MD     February 26, 2018

## 2018-02-26 NOTE — DISCHARGE SUMMARY
Discharge Summary    [facility]  [facility] Help to Those in Need  [phone]      Date of Admission: 2/22/2018  Date of Discharge: 2/22/2018    Rick Álvarez is a 74y.o. year old who was admitted to [facility] at [facility] with a Hospice diagnosis of lung ca;Metastatic primary lung cancer [facility]). Pt was admitted for routine hospice care with symptoms as follows   Active Symptoms:  1. Altered mental status  2. Shortness of breath  3. Weakness and fatigue  4. Airway secretions  5. Non verbal pain indicators      PLAN   1. Admit to routine level of care at [facility]  2. Deactivate AICD  3. Kuo catheter due to urinary retention  4. Dilaudid for Non verbal pain indicators     5.  and SW to support family needs  6. Disposition: pt is moribund and will remain here         The patient's care was focused on comfort and the patient passed away on 2/22/2018.

## 2018-02-28 ENCOUNTER — HOSPITAL ENCOUNTER (OUTPATIENT)
Dept: LAB | Age: 68
Discharge: HOME | End: 2018-02-28